# Patient Record
Sex: MALE | Race: WHITE | Employment: STUDENT | URBAN - METROPOLITAN AREA
[De-identification: names, ages, dates, MRNs, and addresses within clinical notes are randomized per-mention and may not be internally consistent; named-entity substitution may affect disease eponyms.]

---
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
## 2018-03-01 ENCOUNTER — TELEPHONE (OUTPATIENT)
Dept: ENDOCRINOLOGY | Facility: CLINIC | Age: 18
End: 2018-03-01

## 2018-03-01 NOTE — TELEPHONE ENCOUNTER
To schedulers: Please schedule  for lst available endocrine. Preferred provider Shady Lance MD  Endocrine triage          ----- Message from Maritza Kline sent at 3/1/2018  8:06 AM CST -----  Regarding: Pt coming from Pediatrics to Adult Endo from the [facility]  Contact: [phone]  Pt coming from Pediatrics to Adult Endo from the [facility].  Please call Jemma from the [facility] to get this arranged at the number listed above.  Jemma is requesting for pt to see Dr. Caruso and would like to coordinate for Sept.      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  Call Center Representatives DO NOT respond to messages.

## 2018-09-18 ENCOUNTER — OFFICE VISIT (OUTPATIENT)
Dept: PEDIATRIC HEMATOLOGY/ONCOLOGY | Facility: CLINIC | Age: 18
End: 2018-09-18
Attending: NURSE PRACTITIONER
Payer: COMMERCIAL

## 2018-09-18 ENCOUNTER — HOSPITAL ENCOUNTER (OUTPATIENT)
Dept: MRI IMAGING | Facility: CLINIC | Age: 18
Discharge: HOME | End: 2018-09-18
Attending: NURSE PRACTITIONER | Admitting: NURSE PRACTITIONER
Payer: COMMERCIAL

## 2018-09-18 ENCOUNTER — DOCUMENTATION ONLY (OUTPATIENT)
Dept: CARE COORDINATION | Facility: CLINIC | Age: 18
End: 2018-09-18

## 2018-09-18 ENCOUNTER — OFFICE VISIT (OUTPATIENT)
Dept: ENDOCRINOLOGY | Facility: CLINIC | Age: 18
End: 2018-09-18
Payer: COMMERCIAL

## 2018-09-18 VITALS
HEIGHT: 60 IN | HEART RATE: 87 BPM | RESPIRATION RATE: 20 BRPM | BODY MASS INDEX: 27.48 KG/M2 | DIASTOLIC BLOOD PRESSURE: 85 MMHG | SYSTOLIC BLOOD PRESSURE: 124 MMHG | OXYGEN SATURATION: 99 % | TEMPERATURE: 97.9 F | WEIGHT: 139.99 LBS

## 2018-09-18 VITALS
BODY MASS INDEX: 27.29 KG/M2 | DIASTOLIC BLOOD PRESSURE: 85 MMHG | HEART RATE: 87 BPM | SYSTOLIC BLOOD PRESSURE: 124 MMHG | WEIGHT: 139 LBS | HEIGHT: 60 IN

## 2018-09-18 DIAGNOSIS — C71.6 MEDULLOBLASTOMA (H): ICD-10-CM

## 2018-09-18 DIAGNOSIS — E23.0 GROWTH HORMONE DEFICIENCY FROM RADIATION (H): Primary | ICD-10-CM

## 2018-09-18 DIAGNOSIS — C71.6 MEDULLOBLASTOMA OF CEREBELLUM (H): ICD-10-CM

## 2018-09-18 DIAGNOSIS — C71.6 MEDULLOBLASTOMA OF CEREBELLUM (H): Primary | ICD-10-CM

## 2018-09-18 DIAGNOSIS — Z92.3 S/P RADIATION THERAPY: ICD-10-CM

## 2018-09-18 DIAGNOSIS — E23.0 GROWTH HORMONE DEFICIENCY FROM RADIATION (H): ICD-10-CM

## 2018-09-18 DIAGNOSIS — E03.8 SECONDARY HYPOTHYROIDISM: ICD-10-CM

## 2018-09-18 DIAGNOSIS — Z92.21 STATUS POST CHEMOTHERAPY: ICD-10-CM

## 2018-09-18 DIAGNOSIS — Z92.3 HISTORY OF RADIATION THERAPY: ICD-10-CM

## 2018-09-18 DIAGNOSIS — Z91.89 AT RISK FOR CARDIOMYOPATHY: ICD-10-CM

## 2018-09-18 LAB
ALBUMIN SERPL-MCNC: 4.3 G/DL (ref 3.4–5)
ALBUMIN UR-MCNC: NEGATIVE MG/DL
ALP SERPL-CCNC: 96 U/L (ref 65–260)
ALT SERPL W P-5'-P-CCNC: 24 U/L (ref 0–50)
ANION GAP SERPL CALCULATED.3IONS-SCNC: 7 MMOL/L (ref 3–14)
APPEARANCE UR: CLEAR
AST SERPL W P-5'-P-CCNC: 15 U/L (ref 0–35)
BASOPHILS # BLD AUTO: 0 10E9/L (ref 0–0.2)
BASOPHILS NFR BLD AUTO: 0.4 %
BILIRUB SERPL-MCNC: 0.3 MG/DL (ref 0.2–1.3)
BILIRUB UR QL STRIP: NEGATIVE
BUN SERPL-MCNC: 12 MG/DL (ref 7–30)
CALCIUM SERPL-MCNC: 9.2 MG/DL (ref 8.5–10.1)
CHLORIDE SERPL-SCNC: 99 MMOL/L (ref 98–110)
CO2 SERPL-SCNC: 29 MMOL/L (ref 20–32)
COLOR UR AUTO: YELLOW
CREAT SERPL-MCNC: 0.64 MG/DL (ref 0.5–1)
DIFFERENTIAL METHOD BLD: NORMAL
EOSINOPHIL # BLD AUTO: 0.1 10E9/L (ref 0–0.7)
EOSINOPHIL NFR BLD AUTO: 1.2 %
ERYTHROCYTE [DISTWIDTH] IN BLOOD BY AUTOMATED COUNT: 12.8 % (ref 10–15)
GFR SERPL CREATININE-BSD FRML MDRD: >90 ML/MIN/1.7M2
GLUCOSE SERPL-MCNC: 82 MG/DL (ref 70–99)
GLUCOSE UR STRIP-MCNC: NEGATIVE MG/DL
HCT VFR BLD AUTO: 44.9 % (ref 40–53)
HGB BLD-MCNC: 15 G/DL (ref 13.3–17.7)
HGB UR QL STRIP: NEGATIVE
IMM GRANULOCYTES # BLD: 0 10E9/L (ref 0–0.4)
IMM GRANULOCYTES NFR BLD: 0.4 %
KETONES UR STRIP-MCNC: NEGATIVE MG/DL
LEUKOCYTE ESTERASE UR QL STRIP: NEGATIVE
LYMPHOCYTES # BLD AUTO: 3.2 10E9/L (ref 0.8–5.3)
LYMPHOCYTES NFR BLD AUTO: 40.1 %
MCH RBC QN AUTO: 27.6 PG (ref 26.5–33)
MCHC RBC AUTO-ENTMCNC: 33.4 G/DL (ref 31.5–36.5)
MCV RBC AUTO: 83 FL (ref 78–100)
MONOCYTES # BLD AUTO: 0.3 10E9/L (ref 0–1.3)
MONOCYTES NFR BLD AUTO: 3.7 %
MUCOUS THREADS #/AREA URNS LPF: PRESENT /LPF
NEUTROPHILS # BLD AUTO: 4.4 10E9/L (ref 1.6–8.3)
NEUTROPHILS NFR BLD AUTO: 54.2 %
NITRATE UR QL: NEGATIVE
NRBC # BLD AUTO: 0 10*3/UL
NRBC BLD AUTO-RTO: 0 /100
PH UR STRIP: 6 PH (ref 5–7)
PLATELET # BLD AUTO: 297 10E9/L (ref 150–450)
POTASSIUM SERPL-SCNC: 3.3 MMOL/L (ref 3.4–5.3)
PROT SERPL-MCNC: 8.4 G/DL (ref 6.8–8.8)
RBC # BLD AUTO: 5.44 10E12/L (ref 4.4–5.9)
RBC #/AREA URNS AUTO: 1 /HPF (ref 0–2)
SODIUM SERPL-SCNC: 135 MMOL/L (ref 133–144)
SOURCE: ABNORMAL
SP GR UR STRIP: 1.02 (ref 1–1.03)
T4 FREE SERPL-MCNC: 1.04 NG/DL (ref 0.76–1.46)
TSH SERPL DL<=0.005 MIU/L-ACNC: 5.38 MU/L (ref 0.4–4)
UROBILINOGEN UR STRIP-MCNC: 0 MG/DL (ref 0–2)
WBC # BLD AUTO: 8.1 10E9/L (ref 4–11)
WBC #/AREA URNS AUTO: 0 /HPF (ref 0–5)

## 2018-09-18 PROCEDURE — 70553 MRI BRAIN STEM W/O & W/DYE: CPT

## 2018-09-18 PROCEDURE — A9585 GADOBUTROL INJECTION: HCPCS | Performed by: NURSE PRACTITIONER

## 2018-09-18 PROCEDURE — 25500064 ZZH RX 255 OP 636: Performed by: NURSE PRACTITIONER

## 2018-09-18 PROCEDURE — G0463 HOSPITAL OUTPT CLINIC VISIT: HCPCS | Mod: ZF

## 2018-09-18 RX ORDER — CETIRIZINE HYDROCHLORIDE 10 MG/1
10 TABLET ORAL
COMMUNITY

## 2018-09-18 RX ORDER — GADOBUTROL 604.72 MG/ML
7.5 INJECTION INTRAVENOUS ONCE
Status: COMPLETED | OUTPATIENT
Start: 2018-09-18 | End: 2018-09-18

## 2018-09-18 RX ORDER — PANTOPRAZOLE SODIUM 20 MG/1
20 TABLET, DELAYED RELEASE ORAL
COMMUNITY
End: 2021-09-21

## 2018-09-18 RX ADMIN — GADOBUTROL 6.3 ML: 604.72 INJECTION INTRAVENOUS at 09:50

## 2018-09-18 ASSESSMENT — PAIN SCALES - GENERAL
PAINLEVEL: NO PAIN (0)
PAINLEVEL: NO PAIN (0)

## 2018-09-18 NOTE — NURSING NOTE
"Chief Complaint   Patient presents with     RECHECK     Patient is here today for Medulloblastoma follow up     /85 (BP Location: Left arm, Patient Position: Fowlers, Cuff Size: Adult Regular)  Pulse 87  Temp 97.9  F (36.6  C) (Oral)  Resp 20  Ht 1.518 m (4' 11.76\")  Wt 63.5 kg (139 lb 15.9 oz)  SpO2 99%  BMI 27.56 kg/m2    Shala Robertson LPN  September 18, 2018    "

## 2018-09-18 NOTE — MR AVS SNAPSHOT
After Visit Summary   9/18/2018    David Beaulieu    MRN: [id number]           Patient Information     Date Of Birth          2000        Visit Information        Provider Department      9/18/2018 12:00 PM Erica Knight APRN CNP Peds Hematology Oncology        Today's Diagnoses     Medulloblastoma of cerebellum (H)    -  1    Status post chemotherapy        S/P radiation therapy        At risk for cardiomyopathy              [facility], 9th floor  [address] [place] [address]  Phone: [phone]  Clinic Hours:   Monday-Friday:   7 am to 5:00 pm   closed weekends and major  holidays     If your fever is 100.5  or greater,   call the clinic during business hours.   After hours call [phone] and ask for the pediatric hematology / oncology physician to be paged for you.               Follow-ups after your visit        Who to contact     Please call your clinic at [phone] to:    Ask questions about your health    Make or cancel appointments    Discuss your medicines    Learn about your test results    Speak to your doctor            Additional Information About Your Visit        [employer] Information     [employer] gives you secure access to your electronic health record. If you see a primary care provider, you can also send messages to your care team and make appointments. If you have questions, please call your primary care clinic.  If you do not have a primary care provider, please call [phone] and they will assist you.      [employer] is an electronic gateway that provides easy, online access to your medical records. With [employer], you can request a clinic appointment, read your test results, renew a prescription or communicate with your care team.     To access your existing account, please contact your [facility] or call [phone] for assistance.        Care EveryWhere ID     This is your Care  "EveryWhere ID. This could be used by other organizations to access your [place] medical records  [id number]        Your Vitals Were     Pulse Temperature Respirations Height Pulse Oximetry BMI (Body Mass Index)    87 97.9  F (36.6  C) (Oral) 20 1.518 m (4' 11.76\") 99% 27.56 kg/m2       Blood Pressure from Last 3 Encounters:   09/18/18 124/85   09/18/18 124/85   09/19/17 126/82    Weight from Last 3 Encounters:   09/18/18 63 kg (139 lb) (23 %)*   09/18/18 63.5 kg (139 lb 15.9 oz) (25 %)*   09/19/17 63.1 kg (139 lb 1.8 oz) (28 %)*     * Growth percentiles are based on [facility] 2-20 Years data.                 Today's Medication Changes          These changes are accurate as of 9/18/18 11:59 PM.  If you have any questions, ask your nurse or doctor.               These medicines have changed or have updated prescriptions.        Dose/Directions    methylphenidate ER 18 MG CR tablet   Commonly known as:  CONCERTA   This may have changed:  how much to take   Used for:  Medulloblastoma (H)        Dose:  18 mg   Take 1 tablet by mouth every morning.   Quantity:  30 tablet   Refills:  0                Primary Care Provider Office Phone # Fax #    Christian Dominic Youssef [phone]       [facility] [address] [place] [address]        Equal Access to Services     NA GALEANO AH: Kimberly Baig, waaxda jonny, qaybta kaalkane mendes, gita loredo. So [facility] [phone].    ATENCIÓN: Si habla español, tiene a mckay disposición servicios gratuitos de asistencia lingüística. Llame al [phone].    We comply with applicable federal civil rights laws and [place] laws. We do not discriminate on the basis of race, color, national origin, age, disability, sex, sexual orientation, or gender identity.            Thank you!     Thank you for choosing PEDS HEMATOLOGY ONCOLOGY  for your care. Our goal is always to provide you with excellent care. Hearing " back from our patients is one way we can continue to improve our services. Please take a few minutes to complete the written survey that you may receive in the mail after your visit with us. Thank you!             Your Updated Medication List - Protect others around you: Learn how to safely use, store and throw away your medicines at www.disposemymeds.org.          This list is accurate as of 9/18/18 11:59 PM.  Always use your most recent med list.                   Brand Name Dispense Instructions for use Diagnosis    cetirizine 10 MG tablet    zyrTEC     Take 10 mg by mouth        fluticasone 50 MCG/ACT spray    FLONASE    16 g    Spray 2 sprays into both nostrils daily    Medulloblastoma (H), Nasal congestion       ibuprofen 200 MG tablet    ADVIL/MOTRIN     Take 1 tablet by mouth every 6 hours as needed.        levothyroxine 125 MCG tablet    SYNTHROID/LEVOTHROID    90 tablet    Take 1 tablet (125 mcg) by mouth daily    Panhypopituitarism (H), TSH deficiency       methylphenidate ER 18 MG CR tablet    CONCERTA    30 tablet    Take 1 tablet by mouth every morning.    Medulloblastoma (H)       pantoprazole 20 MG EC tablet    PROTONIX     Take 20 mg by mouth        TYLENOL CHILDRENS PO      Take 1 tablet by mouth every 4 hours as needed. CHEW

## 2018-09-18 NOTE — PROGRESS NOTES
We had the pleasure of seeing your patient, David Beaulieu, at the [facility] Childhood Cancer Survivorship Program.  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  His tumor was located in his posterior fossa.  He was treated at the [facility] under the direction of Dr. Grant Rosenbaum.  David had initial surgical resection of his tumor which was as follows:   1.  Suboccipital craniotomy and near total tumor resection on 02/07/2008 with Dr. Wilber Valenzuela at the [facility].        David was treated as per the [facility] protocol [id number] and received the following chemotherapy:   1.  Vincristine intravenously.   2.  Cis-retinoic acid orally.   3.  Cisplatin intravenously with cumulative dose of 394 mg/meter squared.   4.  Cyclophosphamide intravenously with a cumulative dose of 12 grams/meter squared.      David also received radiation therapy as part of his treatment which is as follows:   1.  Whole brain radiation which started on 03/04/2008 and was completed on 03/31/2008 in 20 fractions, for a total dose of 3600 centigray.   2.  Spine radiation which started on 03/04/2008 was completed on 03/31/2008 in 20 fractions, for a total dose of 3600 centigray.   3.  Posterior fossa boost, which started on 04/01/2008 and was completed on 04/15/2008 in 11 fractions, for a total of 1972 centigray.   4.  Total dose to posterior fossa is 5572 centigray.      David's acute and chronic late effects known to date include:   1.  Severe bilateral sensorineural hearing loss diagnosed on 08/21/2008- wears hearing aids.   2.  Growth deceleration found on 09/22/2009.   3.  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   4.  Lower extremity weakness diagnosed on 11/03/2009, for which he started physical therapy.   5.  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   6.   "Problems with executive functioning diagnosed on 10/25/2010.   7.  Slow processing speed diagnosed on 10/25/2010.   8.  Problems with fine motor skills diagnosed on 10/25/2010.   9.  Reduced spinal growth diagnosed on 07/24/2012.   10.  Problems with sustained attention diagnosed on 08/17/2012.   11.  Problems with emotional lability.   12.  Depression which was diagnosed on 12/03/2014.   13.  Left ventricular dysfunction diagnosed 9/2015       HISTORY OF PRESENT ILLNESS:  David reports to his long term follow up evaluation with his father.  He has been doing fairly well.  The issues with his endurance have improved.  He did get an echo locally last year that was normal.   He reports that he had local PFTs that were at the lower limits of normal.  He saw local pulmonary and there were no concerns.  He did see ENT for his throat issues last year.  He was prescribed a \"pill\" and \"inhaler\" which improved his symptoms.   He is seeing adult endocrinology, Dr. Caruso, for the 1st time today.   With regards to his attention, he is still taking Concerta.   He does have accommodations in school to get extra time for testing and preferred seating.  He had neuropsych testing done in 12/2014.  David denies any headaches, vision changes.  He hasn't been for an eye doctor visit.    He denies any seizure-like activity or tremors, denied any back pain, no fever.   With regards to his hearing, he received hearing aids 2 years ago.  He had his hearing tested last May 2017.  He is due for a hearing test locally.    Sleep and appetite good.  No skin changes.  He did have 2 cavities at the dentist last visit.     REVIEW OF SYSTEMS:  Comprehensive review of systems was negative except as started above.      CURRENT MEDICATIONS:   Current Outpatient Prescriptions   Medication     Acetaminophen (TYLENOL CHILDRENS PO)     cetirizine (ZYRTEC) 10 MG tablet     fluticasone (FLONASE) 50 MCG/ACT nasal spray     ibuprofen (ADVIL,MOTRIN) 200 MG " "tablet     levothyroxine (SYNTHROID/LEVOTHROID) 125 MCG tablet     methylphenidate (CONCERTA) 18 MG CR tablet     pantoprazole (PROTONIX) 20 MG EC tablet     No current facility-administered medications for this visit.           ALLERGIES:  Amoxicillin.      FAMILY HISTORY:     Family History   Problem Relation Age of Onset     Cerebrovascular Disease Mother      Childhood Heart Surgery Father      PDA s/p repair     Hypertension Father    No change         SOCIAL HISTORY:  David is in his 2nd year of college at [employer] in [place].  He has accommodations for preferrential seating and extra time for tests.  He lives in [place].   Met with social work today for routine assessment.  He is majoring in accounting and will get an associates degree.  He would like to work in payroll.     PHYSICAL EXAMINATION:     VITAL SIGNS:   /85 (BP Location: Left arm, Patient Position: Fowlers, Cuff Size: Adult Regular)  Pulse 87  Temp 97.9  F (36.6  C) (Oral)  Resp 20  Ht 1.518 m (4' 11.76\")  Wt 63.5 kg (139 lb 15.9 oz)  SpO2 99%  BMI 27.56 kg/m2    Wt Readings from Last 3 Encounters:   09/18/18 63.5 kg (139 lb 15.9 oz) (25 %)*   09/19/17 63.1 kg (139 lb 1.8 oz) (28 %)*   09/19/17 63.1 kg (139 lb 1.8 oz) (28 %)*     * Growth percentiles are based on CDC 2-20 Years data.     Ht Readings from Last 2 Encounters:   09/18/18 1.518 m (4' 11.76\") (<1 %)*   09/19/17 1.511 m (4' 11.49\") (<1 %)*     * Growth percentiles are based on CDC 2-20 Years data.     88 %ile based on CDC 2-20 Years BMI-for-age data using vitals from 9/18/2018.      GENERAL:  David appears to be in no acute distress.  He is alert and oriented x3 and well-nourished.   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  Bilateral tympanic membranes are within normal limits.  Pupils are equally round and reactive to light and accommodation.  Extraocular movements are intact.  Oropharynx is clear without lesions. "   NECK:  Supple.  Thyroid is nonpalpable.  There is a well-healed occipital surgical scar.   CARDIOVASCULAR:  Regular rate and rhythm with no murmurs, rubs or gallops.  Intact distal pulses.   RESPIRATORY:  Breath sounds are clear to auscultation bilaterally with normal effort and no distress.  No wheezes, crackles or rales were auscultated.   ABDOMEN:  Soft, nondistended, nontender, with bowel sounds in all 4 quadrants, no palpable masses or tenderness.   MUSCULOSKELETAL:  Normal range of motion. Muscular hypoplasia to paraspinous muscles - stable  LYMPHATIC:  Patient has no lymphadenopathy.   NEUROLOGIC:  Natali is alert and oriented x3, has 2-3+ lower bilateral reflexes.  He has no cranial nerve deficits and has normal muscle tone.  GCS score is 15.   SKIN:  Skin is warm and dry with no rashes or erythema.   PSYCHIATRIC:  Mood and affect are within normal limits.      LABORATORY STUDIES:   Results for NATALI MALAVE (MRN [id number]) as of 9/22/2018 11:48   Ref. Range 9/18/2018 15:46 9/18/2018 16:01   Sodium Latest Ref Range: 133 - 144 mmol/L 135    Potassium Latest Ref Range: 3.4 - 5.3 mmol/L 3.3 (L)    Chloride Latest Ref Range: 98 - 110 mmol/L 99    Carbon Dioxide Latest Ref Range: 20 - 32 mmol/L 29    Urea Nitrogen Latest Ref Range: 7 - 30 mg/dL 12    Creatinine Latest Ref Range: 0.50 - 1.00 mg/dL 0.64    GFR Estimate Latest Ref Range: >60 mL/min/1.7m2 >90    GFR Estimate If Black Latest Ref Range: >60 mL/min/1.7m2 >90    Calcium Latest Ref Range: 8.5 - 10.1 mg/dL 9.2    Anion Gap Latest Ref Range: 3 - 14 mmol/L 7    Albumin Latest Ref Range: 3.4 - 5.0 g/dL 4.3    Protein Total Latest Ref Range: 6.8 - 8.8 g/dL 8.4    Bilirubin Total Latest Ref Range: 0.2 - 1.3 mg/dL 0.3    Alkaline Phosphatase Latest Ref Range: 65 - 260 U/L 96    ALT Latest Ref Range: 0 - 50 U/L 24    AST Latest Ref Range: 0 - 35 U/L 15    Cortisol Serum Latest Ref Range: 4 - 22 ug/dL 13.6    T4 Free Latest Ref Range: 0.76 - 1.46 ng/dL 1.04     TSH Latest Ref Range: 0.40 - 4.00 mU/L 5.38 (H)    Glucose Latest Ref Range: 70 - 99 mg/dL 82    WBC Latest Ref Range: 4.0 - 11.0 10e9/L 8.1    Hemoglobin Latest Ref Range: 13.3 - 17.7 g/dL 15.0    Hematocrit Latest Ref Range: 40.0 - 53.0 % 44.9    Platelet Count Latest Ref Range: 150 - 450 10e9/L 297    RBC Count Latest Ref Range: 4.4 - 5.9 10e12/L 5.44    MCV Latest Ref Range: 78 - 100 fl 83    MCH Latest Ref Range: 26.5 - 33.0 pg 27.6    MCHC Latest Ref Range: 31.5 - 36.5 g/dL 33.4    RDW Latest Ref Range: 10.0 - 15.0 % 12.8    Diff Method Unknown Automated Method    % Neutrophils Latest Units: % 54.2    % Lymphocytes Latest Units: % 40.1    % Monocytes Latest Units: % 3.7    % Eosinophils Latest Units: % 1.2    % Basophils Latest Units: % 0.4    % Immature Granulocytes Latest Units: % 0.4    Nucleated RBCs Latest Ref Range: 0 /100 0    Absolute Neutrophil Latest Ref Range: 1.6 - 8.3 10e9/L 4.4    Absolute Lymphocytes Latest Ref Range: 0.8 - 5.3 10e9/L 3.2    Absolute Monocytes Latest Ref Range: 0.0 - 1.3 10e9/L 0.3    Absolute Eosinophils Latest Ref Range: 0.0 - 0.7 10e9/L 0.1    Absolute Basophils Latest Ref Range: 0.0 - 0.2 10e9/L 0.0    Abs Immature Granulocytes Latest Ref Range: 0 - 0.4 10e9/L 0.0    Absolute Nucleated RBC Unknown 0.0    Color Urine Unknown  Yellow   Appearance Urine Unknown  Clear   Glucose Urine Latest Ref Range: NEG^Negative mg/dL  Negative   Bilirubin Urine Latest Ref Range: NEG^Negative   Negative   Ketones Urine Latest Ref Range: NEG^Negative mg/dL  Negative   Specific Gravity Urine Latest Ref Range: 1.003 - 1.035   1.017   pH Urine Latest Ref Range: 5.0 - 7.0 pH  6.0   Protein Albumin Urine Latest Ref Range: NEG^Negative mg/dL  Negative   Urobilinogen mg/dL Latest Ref Range: 0.0 - 2.0 mg/dL  0.0   Nitrite Urine Latest Ref Range: NEG^Negative   Negative   Blood Urine Latest Ref Range: NEG^Negative   Negative   Leukocyte Esterase Urine Latest Ref Range: NEG^Negative   Negative    Source Unknown  Midstream Urine   WBC Urine Latest Ref Range: 0 - 5 /HPF  0   RBC Urine Latest Ref Range: 0 - 2 /HPF  1   Mucous Urine Latest Ref Range: NEG^Negative /LPF  Present (A)   Ins Growth Factor 1 Latest Ref Range: 137 - 428 ng/ml 232         DIAGNOSTIC IMAGING:     Preliminary MRI report; slight increase in cavernomas but otherwise stable.    Results for orders placed or performed during the hospital encounter of 09/18/18   MRI Brain w & w/o contrast    Narrative     MR BRAIN W/O & W CONTRAST 9/18/2018 10:19 AM    History: Medulloblastoma diagnosed on 02/07/2008 at 7 years of age.  ?Initial surgical resection of his tumor with suboccipital craniotomy  and near total tumor resection on 02/07/2008.  ??  Patient was treated with subsequent chemo therapy and radiation  therapy.    ??  Comparison: Head MRI 9/18/2017 9/20/2016, 9/30/2015 and 2/8/2008    Technique: Multiplanar T1-weighted, axial FLAIR, and susceptibility  images were obtained without intravenous contrast. Following  intravenous gadolinium-based contrast administration, axial  T2-weighted, diffusion, and T1-weighted images (in multiple planes)  were obtained.    Contrast dose: 6.3ml gadavist    Findings:   Postsurgical changes of suboccipital craniectomy and resection of  medulloblastoma of the fourth ventricle with evidence of hemosiderin  staining of the surgical cavity. Hypotrophic cerebellar vermis and  right cerebellar hemisphere, similar to 2017, may be due to radiation  and surgery.     More numerous scattered punctate foci of susceptibility artifact  within the cerebellum. One focus in the left cerebellar lobe appears  enlarged compared to prior. There is no evidence of enhancement or  increased diffusion restriction to suggest recurrence.    Stable T1 signal within the dentate nuclei left more than right,  likely due to gadolinium accumulation.    No definite abnormality of the skull marrow signal is noted. The major  vascular  intracranial flow-voids are present. The visualized portions  of paranasal sinuses, and mastoid air cells are relatively clear. The  orbits are grossly unremarkable.      Impression    Impression:  1. Postsurgical changes of posterior fossa medulloblastoma resection  without evidence of tumor recurrence.  2. 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  3. Stable signs of gadolinium accumulation    I have personally reviewed the examination and initial interpretation  and I agree with the findings.    ERICA LEMOS MD         Audiogram: done locally        ASSESSMENT AND PLAN:  David Beaulieu is a now 17 year-old  male with a history of medulloblastoma that completed treatment in 04/2009 with cis-retinoic acid.  He has been off therapy for 9 years.  He has some late effects in the area of endocrinopathies as well as neurocognitive likely due to his radiation therapy.  He also has hearing loss from his cisplatin treatment.   He continues to have stable radiation related changes on his MRI.   The following are our long term recommendations:     1.  Risk of recurrence:  David is currently 9 years from the end of treatment for his medulloblastoma.  Given the distance from the diagnosis of his primary malignancy, the likelihood of recurrence continues to decline.  We would recommend that he continue to have yearly assessment based on his previous history as well as radiation with MRIs yearly.  We will order this to occur in 12 months to coincide with his next Endocrinology appointment. After he is 10 years off therapy, we will discuss changing the MRI frequency to every other year.  Currently we are monitoring his radiation changes.    2.  Psychosocial effects:  David had previous neuropsych testing and was found to have problems with sustained attention, processing speed and some motor skills.  He has accommodations in college.  If he notices any significant  changes in cognition, we would recommend repeat testing.   3.  Risk for cardiac toxicity:  David has a history of spine radiation, which puts him at risk for developing cardiomyopathy and valvular dysfunction.  We recommend he have an echocardiogram every 5 years.  Baseline study in 2015 showed decreased LVEF to 47% in 4 chamber and 52% biplane; also small PFO with L to R shunting.  He saw peds cardiology in 2015 but family would like to follow up locally.  He had a local echocardiogram last year that was completely normal.  We will plan to reimage next year in 2019.   There is also increasing evidence regarding metabolic syndrome in cancer survivors.  We would recommend that David have fasting lipids followed at least every 2-3 years.  Last done in 2015, so they should be repeated next year.  4.  Hearing loss:  David has a history of sensorineural hearing loss likely related to his posterior fossa radiation and cisplatin treatment.  He has hearing aids now and should continue his exams locally.  5.  Risk for peripheral neuropathy:  David has a history of vincristine exposure, which does put him at increased risk for peripheral motor neuropathy.  He does have some previous issues with fine motor skills, but these appear to be improved.   6.  Endocrinopathies:  David has a history of growth hormone deficiency and hypothyroidism, which are both likely from his radiation treatment.  He should have regular followup with Endocrinology as directed.   7.  Risk for secondary neoplasms/vascular changes:  David has a history of radiation, which does put him at increased risk for secondary neoplasms.  I counseled him that should he notice any changes in his cognitive status, any new lumps, bumps, rashes that they should be promptly evaluated given his history of radiation.  He will have an MRI done in 12 months to evaluate post his whole brain radiation.  I did discuss with his father that we would stop doing spine  examinations at this point and solely do brain MRI.   He had a new likely radiation induced blooming artifact that we will continue to follow.  I did also discuss the risk for stroke post radiation and reviewed symptoms.  Mom does have a history of CVA and dad is unsure if she has any underlying disorder that caused this.  CBC today is normal.  8.  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  He currently has no back pain or concerns.   9.  Risk for kidney toxicity:  David has a history of cisplatin and cyclophosphamide treatment, which can cause kidney and bladder dysfunction.  David's baseline CMP and UA were within normal limits.  We recommend that he have yearly assessments of his blood pressure and urinalysis.    10. Risk for cataracts:  David has a history of whole brain radiation so he has a risk for cataracts.  He should have regular eye exams completed.  David will make an appt in the near future.  No cataracts noted on exam today.       It was a pleasure seeing David in our Cancer Survivorship Program.  We appreciate the opportunity to participate in your patient's care.  If you have any questions or concerns, please do not hesitate to contact us at [phone].   We ask that David return in 12 months with an MRI brain and echo.  He will get his next audiogram locally.     We will try to coordinate with endocrine.           Erica Knight MSN, APRN, CPNP-AC, CPON  Department of Pediatrics  Division of Hematology/Oncology

## 2018-09-18 NOTE — PROGRESS NOTES
"Long-Term Follow-up/Survivorship  Psychosocial Assessment    Assessment completed of living situation, support system, financial status, functional status, coping, stressors, need for resources and social work intervention provided as needed.     Diagnosis: The patient was diagnosed with Medulloblastoma in February of 2008 at the age of 7.     Provider: Erica Knight.    Presenting Information: David is a 17 year-old, male, who presented to the [facility] clinic on 9/18/18 with his father, Elliot.    Living Situation: David reported he lives in a dorm room on campus at [employer], located in [place]. David continues to visit his family on breaks.  His family lives in [place], [place]. He has a younger sister, Eugenia, who lives at home with his parents.    Transportation Mode: David's dad drove him to his appointment today. In addition, David reported he drives independently. He recently purchased a new vehicle and transportation barriers were not identified.    Family Constellation and Support Network: David's support consists of his parents, extended family, and friends. Support is reportedly adequate.    Interests/Activities: David reported he has been busy between work and school, but enjoys playing video games and watching TV. David reported he also likes watching Netflix and finding new shows.     Cultural and Worship Factors: The family identifies as Yarsanism.     Insurance: Elliot reported they were successful in having David \"SMRT'ed\" through the [facility] and he now has medical assistance. Insurance coverage is adequate.     Employment/Financial: David reported he applied for social security, but was denied. He may re-apply in the future, but will continue to maintain employment for the time being. 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    Legal: No " legal involvement or concerns identified.     Patient Education/Development Level: David is a sophomore at [employer] in [place], [place] studying accounting.  He is doing well in school and has accommodations in place for extra test taking time and preferential seating.  Updated neuropsych testing was completed in December of 2016. Should any changes occur, updated testing would be recommended.    Mental/Chemical Health: David described his mood as being good. He was pleasant and talkative during our time together.  David does not have a history of mental health concerns to report. He has not seen a therapist and/or taken medication to assist with mood management in the past. Concerns were not identified.    Trauma History: No concerns identified.     Emotional/Social/Cognitive Effect: David seems to have adjusted well as a survivor.  He is doing well in school with accommodations in place, has good support, and denies any mental health concerns.  Concerns were not identified.    Advanced Medical Directive (For 18 year old patients and emancipated minors only): David makes his own medical decisions and is not interested in a HCD at this time.     Assessment and Recommendations for the Team: David appears to be effectively transitioning to adult care. He currently lives in a dorm, has been employed full-time in a seasonal position, and attends [employer]. No psychosocial barriers were identified, but writer will remain available if anything were to change.       Community/Supportive Resources: n/a    Interventions:   1. Provide ongoing assessment of patient and family's level of coping.   2. Provide psychosocial supportive counseling and crisis intervention as needed.   3. Facilitate service linkage with hospital and community resources as needed.   4. Collaborate with healthcare team to meet patient and family's needs.     Plan:    provided a business card and encouraged the  patient to call if any questions or concerns arise before next clinic visit.     JIMMY Woodward, CHARLINE  Clinical   Pediatric Outpatient Clinics/Long Term Follow-Up/Women s Health    [facility]   [email or web address]   Office: [phone]   Pager: [phone]

## 2018-09-18 NOTE — LETTER
Patient:  David Beaulieu  :   2000  MRN:     [id number]        Mr.Zeffen GENEVIEVE Beaulieu   [id number]  [facility] [id number]        2018    Dear ,    We are writing to inform you of your test results. Please make sure to take levothyroxine everyday without missing.    Take care    Karen Caruso MD      Resulted Orders   TSH   Result Value Ref Range    TSH 5.38 (H) 0.40 - 4.00 mU/L   T4 free   Result Value Ref Range    T4 Free 1.04 0.76 - 1.46 ng/dL       Lab

## 2018-09-18 NOTE — LETTER
9/18/2018      RE: David Beaulieu  [address]  [place] [address]              We had the pleasure of seeing your patient, David Beaulieu, at the [facility] Childhood Cancer Survivorship Program.  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  His tumor was located in his posterior fossa.  He was treated at the [facility] under the direction of Dr. Grant Rosenbaum.  David had initial surgical resection of his tumor which was as follows:   1.  Suboccipital craniotomy and near total tumor resection on 02/07/2008 with Dr. Wilber Valenzuela at the [facility].        David was treated as per the [facility] protocol [id number] and received the following chemotherapy:   1.  Vincristine intravenously.   2.  Cis-retinoic acid orally.   3.  Cisplatin intravenously with cumulative dose of 394 mg/meter squared.   4.  Cyclophosphamide intravenously with a cumulative dose of 12 grams/meter squared.      David also received radiation therapy as part of his treatment which is as follows:   1.  Whole brain radiation which started on 03/04/2008 and was completed on 03/31/2008 in 20 fractions, for a total dose of 3600 centigray.   2.  Spine radiation which started on 03/04/2008 was completed on 03/31/2008 in 20 fractions, for a total dose of 3600 centigray.   3.  Posterior fossa boost, which started on 04/01/2008 and was completed on 04/15/2008 in 11 fractions, for a total of 1972 centigray.   4.  Total dose to posterior fossa is 5572 centigray.      David's acute and chronic late effects known to date include:   1.  Severe bilateral sensorineural hearing loss diagnosed on 08/21/2008- wears hearing aids.   2.  Growth deceleration found on 09/22/2009.   3.  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   4.  Lower extremity weakness diagnosed on 11/03/2009, for which he started  "physical therapy.   5.  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   6.  Problems with executive functioning diagnosed on 10/25/2010.   7.  Slow processing speed diagnosed on 10/25/2010.   8.  Problems with fine motor skills diagnosed on 10/25/2010.   9.  Reduced spinal growth diagnosed on 07/24/2012.   10.  Problems with sustained attention diagnosed on 08/17/2012.   11.  Problems with emotional lability.   12.  Depression which was diagnosed on 12/03/2014.   13.  Left ventricular dysfunction diagnosed 9/2015       HISTORY OF PRESENT ILLNESS:  David reports to his long term follow up evaluation with his father.  He has been doing fairly well.  The issues with his endurance have improved.  He did get an echo locally last year that was normal.   He reports that he had local PFTs that were at the lower limits of normal.  He saw local pulmonary and there were no concerns.  He did see ENT for his throat issues last year.  He was prescribed a \"pill\" and \"inhaler\" which improved his symptoms.   He is seeing adult endocrinology, Dr. Caruso, for the 1st time today.   With regards to his attention, he is still taking Concerta.   He does have accommodations in school to get extra time for testing and preferred seating.  He had neuropsych testing done in 12/2014.  David denies any headaches, vision changes.  He hasn't been for an eye doctor visit.    He denies any seizure-like activity or tremors, denied any back pain, no fever.   With regards to his hearing, he received hearing aids 2 years ago.  He had his hearing tested last May 2017.  He is due for a hearing test locally.    Sleep and appetite good.  No skin changes.  He did have 2 cavities at the dentist last visit.     REVIEW OF SYSTEMS:  Comprehensive review of systems was negative except as started above.      CURRENT MEDICATIONS:   Current Outpatient Prescriptions   Medication     Acetaminophen (TYLENOL CHILDRENS PO)     cetirizine (ZYRTEC) 10 MG tablet " "    fluticasone (FLONASE) 50 MCG/ACT nasal spray     ibuprofen (ADVIL,MOTRIN) 200 MG tablet     levothyroxine (SYNTHROID/LEVOTHROID) 125 MCG tablet     methylphenidate (CONCERTA) 18 MG CR tablet     pantoprazole (PROTONIX) 20 MG EC tablet     No current facility-administered medications for this visit.           ALLERGIES:  Amoxicillin.      FAMILY HISTORY:     Family History   Problem Relation Age of Onset     Cerebrovascular Disease Mother      Childhood Heart Surgery Father      PDA s/p repair     Hypertension Father    No change         SOCIAL HISTORY:  David is in his 2nd year of college at [employer] in [place].  He has accommodations for preferrential seating and extra time for tests.  He lives in [place].   Met with social work today for routine assessment.  He is majoring in accounting and will get an associates degree.  He would like to work in payroll.     PHYSICAL EXAMINATION:     VITAL SIGNS:   /85 (BP Location: Left arm, Patient Position: Fowlers, Cuff Size: Adult Regular)  Pulse 87  Temp 97.9  F (36.6  C) (Oral)  Resp 20  Ht 1.518 m (4' 11.76\")  Wt 63.5 kg (139 lb 15.9 oz)  SpO2 99%  BMI 27.56 kg/m2    Wt Readings from Last 3 Encounters:   09/18/18 63.5 kg (139 lb 15.9 oz) (25 %)*   09/19/17 63.1 kg (139 lb 1.8 oz) (28 %)*   09/19/17 63.1 kg (139 lb 1.8 oz) (28 %)*     * Growth percentiles are based on CDC 2-20 Years data.     Ht Readings from Last 2 Encounters:   09/18/18 1.518 m (4' 11.76\") (<1 %)*   09/19/17 1.511 m (4' 11.49\") (<1 %)*     * Growth percentiles are based on CDC 2-20 Years data.     88 %ile based on CDC 2-20 Years BMI-for-age data using vitals from 9/18/2018.      GENERAL:  David appears to be in no acute distress.  He is alert and oriented x3 and well-nourished.   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  Bilateral tympanic membranes are within normal limits.  Pupils are equally round and reactive to light and " accommodation.  Extraocular movements are intact.  Oropharynx is clear without lesions.   NECK:  Supple.  Thyroid is nonpalpable.  There is a well-healed occipital surgical scar.   CARDIOVASCULAR:  Regular rate and rhythm with no murmurs, rubs or gallops.  Intact distal pulses.   RESPIRATORY:  Breath sounds are clear to auscultation bilaterally with normal effort and no distress.  No wheezes, crackles or rales were auscultated.   ABDOMEN:  Soft, nondistended, nontender, with bowel sounds in all 4 quadrants, no palpable masses or tenderness.   MUSCULOSKELETAL:  Normal range of motion. Muscular hypoplasia to paraspinous muscles - stable  LYMPHATIC:  Patient has no lymphadenopathy.   NEUROLOGIC:  Natali is alert and oriented x3, has 2-3+ lower bilateral reflexes.  He has no cranial nerve deficits and has normal muscle tone.  GCS score is 15.   SKIN:  Skin is warm and dry with no rashes or erythema.   PSYCHIATRIC:  Mood and affect are within normal limits.      LABORATORY STUDIES:   Results for NATALI MALAVE (MRN [id number]) as of 9/22/2018 11:48   Ref. Range 9/18/2018 15:46 9/18/2018 16:01   Sodium Latest Ref Range: 133 - 144 mmol/L 135    Potassium Latest Ref Range: 3.4 - 5.3 mmol/L 3.3 (L)    Chloride Latest Ref Range: 98 - 110 mmol/L 99    Carbon Dioxide Latest Ref Range: 20 - 32 mmol/L 29    Urea Nitrogen Latest Ref Range: 7 - 30 mg/dL 12    Creatinine Latest Ref Range: 0.50 - 1.00 mg/dL 0.64    GFR Estimate Latest Ref Range: >60 mL/min/1.7m2 >90    GFR Estimate If Black Latest Ref Range: >60 mL/min/1.7m2 >90    Calcium Latest Ref Range: 8.5 - 10.1 mg/dL 9.2    Anion Gap Latest Ref Range: 3 - 14 mmol/L 7    Albumin Latest Ref Range: 3.4 - 5.0 g/dL 4.3    Protein Total Latest Ref Range: 6.8 - 8.8 g/dL 8.4    Bilirubin Total Latest Ref Range: 0.2 - 1.3 mg/dL 0.3    Alkaline Phosphatase Latest Ref Range: 65 - 260 U/L 96    ALT Latest Ref Range: 0 - 50 U/L 24    AST Latest Ref Range: 0 - 35 U/L 15    Cortisol Serum  Latest Ref Range: 4 - 22 ug/dL 13.6    T4 Free Latest Ref Range: 0.76 - 1.46 ng/dL 1.04    TSH Latest Ref Range: 0.40 - 4.00 mU/L 5.38 (H)    Glucose Latest Ref Range: 70 - 99 mg/dL 82    WBC Latest Ref Range: 4.0 - 11.0 10e9/L 8.1    Hemoglobin Latest Ref Range: 13.3 - 17.7 g/dL 15.0    Hematocrit Latest Ref Range: 40.0 - 53.0 % 44.9    Platelet Count Latest Ref Range: 150 - 450 10e9/L 297    RBC Count Latest Ref Range: 4.4 - 5.9 10e12/L 5.44    MCV Latest Ref Range: 78 - 100 fl 83    MCH Latest Ref Range: 26.5 - 33.0 pg 27.6    MCHC Latest Ref Range: 31.5 - 36.5 g/dL 33.4    RDW Latest Ref Range: 10.0 - 15.0 % 12.8    Diff Method Unknown Automated Method    % Neutrophils Latest Units: % 54.2    % Lymphocytes Latest Units: % 40.1    % Monocytes Latest Units: % 3.7    % Eosinophils Latest Units: % 1.2    % Basophils Latest Units: % 0.4    % Immature Granulocytes Latest Units: % 0.4    Nucleated RBCs Latest Ref Range: 0 /100 0    Absolute Neutrophil Latest Ref Range: 1.6 - 8.3 10e9/L 4.4    Absolute Lymphocytes Latest Ref Range: 0.8 - 5.3 10e9/L 3.2    Absolute Monocytes Latest Ref Range: 0.0 - 1.3 10e9/L 0.3    Absolute Eosinophils Latest Ref Range: 0.0 - 0.7 10e9/L 0.1    Absolute Basophils Latest Ref Range: 0.0 - 0.2 10e9/L 0.0    Abs Immature Granulocytes Latest Ref Range: 0 - 0.4 10e9/L 0.0    Absolute Nucleated RBC Unknown 0.0    Color Urine Unknown  Yellow   Appearance Urine Unknown  Clear   Glucose Urine Latest Ref Range: NEG^Negative mg/dL  Negative   Bilirubin Urine Latest Ref Range: NEG^Negative   Negative   Ketones Urine Latest Ref Range: NEG^Negative mg/dL  Negative   Specific Gravity Urine Latest Ref Range: 1.003 - 1.035   1.017   pH Urine Latest Ref Range: 5.0 - 7.0 pH  6.0   Protein Albumin Urine Latest Ref Range: NEG^Negative mg/dL  Negative   Urobilinogen mg/dL Latest Ref Range: 0.0 - 2.0 mg/dL  0.0   Nitrite Urine Latest Ref Range: NEG^Negative   Negative   Blood Urine Latest Ref Range:  NEG^Negative   Negative   Leukocyte Esterase Urine Latest Ref Range: NEG^Negative   Negative   Source Unknown  Midstream Urine   WBC Urine Latest Ref Range: 0 - 5 /HPF  0   RBC Urine Latest Ref Range: 0 - 2 /HPF  1   Mucous Urine Latest Ref Range: NEG^Negative /LPF  Present (A)   Ins Growth Factor 1 Latest Ref Range: 137 - 428 ng/ml 232         DIAGNOSTIC IMAGING:     Preliminary MRI report; slight increase in cavernomas but otherwise stable.    Results for orders placed or performed during the hospital encounter of 09/18/18   MRI Brain w & w/o contrast    Narrative     MR BRAIN W/O & W CONTRAST 9/18/2018 10:19 AM    History: Medulloblastoma diagnosed on 02/07/2008 at 7 years of age.  ?Initial surgical resection of his tumor with suboccipital craniotomy  and near total tumor resection on 02/07/2008.  ??  Patient was treated with subsequent chemo therapy and radiation  therapy.    ??  Comparison: Head MRI 9/18/2017 9/20/2016, 9/30/2015 and 2/8/2008    Technique: Multiplanar T1-weighted, axial FLAIR, and susceptibility  images were obtained without intravenous contrast. Following  intravenous gadolinium-based contrast administration, axial  T2-weighted, diffusion, and T1-weighted images (in multiple planes)  were obtained.    Contrast dose: 6.3ml gadavist    Findings:   Postsurgical changes of suboccipital craniectomy and resection of  medulloblastoma of the fourth ventricle with evidence of hemosiderin  staining of the surgical cavity. Hypotrophic cerebellar vermis and  right cerebellar hemisphere, similar to 2017, may be due to radiation  and surgery.     More numerous scattered punctate foci of susceptibility artifact  within the cerebellum. One focus in the left cerebellar lobe appears  enlarged compared to prior. There is no evidence of enhancement or  increased diffusion restriction to suggest recurrence.    Stable T1 signal within the dentate nuclei left more than right,  likely due to gadolinium  accumulation.    No definite abnormality of the skull marrow signal is noted. The major  vascular intracranial flow-voids are present. The visualized portions  of paranasal sinuses, and mastoid air cells are relatively clear. The  orbits are grossly unremarkable.      Impression    Impression:  1. Postsurgical changes of posterior fossa medulloblastoma resection  without evidence of tumor recurrence.  2. 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  3. Stable signs of gadolinium accumulation    I have personally reviewed the examination and initial interpretation  and I agree with the findings.    ERICA LEMOS MD         Audiogram: done locally        ASSESSMENT AND PLAN:  David Beaulieu is a now 17 year-old  male with a history of medulloblastoma that completed treatment in 04/2009 with cis-retinoic acid.  He has been off therapy for 9 years.  He has some late effects in the area of endocrinopathies as well as neurocognitive likely due to his radiation therapy.  He also has hearing loss from his cisplatin treatment.   He continues to have stable radiation related changes on his MRI.   The following are our long term recommendations:     1.  Risk of recurrence:  David is currently 9 years from the end of treatment for his medulloblastoma.  Given the distance from the diagnosis of his primary malignancy, the likelihood of recurrence continues to decline.  We would recommend that he continue to have yearly assessment based on his previous history as well as radiation with MRIs yearly.  We will order this to occur in 12 months to coincide with his next Endocrinology appointment. After he is 10 years off therapy, we will discuss changing the MRI frequency to every other year.  Currently we are monitoring his radiation changes.    2.  Psychosocial effects:  David had previous neuropsych testing and was found to have problems with sustained attention, processing  speed and some motor skills.  He has accommodations in college.  If he notices any significant changes in cognition, we would recommend repeat testing.   3.  Risk for cardiac toxicity:  David has a history of spine radiation, which puts him at risk for developing cardiomyopathy and valvular dysfunction.  We recommend he have an echocardiogram every 5 years.  Baseline study in 2015 showed decreased LVEF to 47% in 4 chamber and 52% biplane; also small PFO with L to R shunting.  He saw peds cardiology in 2015 but family would like to follow up locally.  He had a local echocardiogram last year that was completely normal.  We will plan to reimage next year in 2019.   There is also increasing evidence regarding metabolic syndrome in cancer survivors.  We would recommend that David have fasting lipids followed at least every 2-3 years.  Last done in 2015, so they should be repeated next year.  4.  Hearing loss:  David has a history of sensorineural hearing loss likely related to his posterior fossa radiation and cisplatin treatment.  He has hearing aids now and should continue his exams locally.  5.  Risk for peripheral neuropathy:  David has a history of vincristine exposure, which does put him at increased risk for peripheral motor neuropathy.  He does have some previous issues with fine motor skills, but these appear to be improved.   6.  Endocrinopathies:  David has a history of growth hormone deficiency and hypothyroidism, which are both likely from his radiation treatment.  He should have regular followup with Endocrinology as directed.   7.  Risk for secondary neoplasms/vascular changes:  David has a history of radiation, which does put him at increased risk for secondary neoplasms.  I counseled him that should he notice any changes in his cognitive status, any new lumps, bumps, rashes that they should be promptly evaluated given his history of radiation.  He will have an MRI done in 12 months to evaluate post  his whole brain radiation.  I did discuss with his father that we would stop doing spine examinations at this point and solely do brain MRI.   He had a new likely radiation induced blooming artifact that we will continue to follow.  I did also discuss the risk for stroke post radiation and reviewed symptoms.  Mom does have a history of CVA and dad is unsure if she has any underlying disorder that caused this.  CBC today is normal.  8.  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  He currently has no back pain or concerns.   9.  Risk for kidney toxicity:  David has a history of cisplatin and cyclophosphamide treatment, which can cause kidney and bladder dysfunction.  David's baseline CMP and UA were within normal limits.  We recommend that he have yearly assessments of his blood pressure and urinalysis.    10. Risk for cataracts:  David has a history of whole brain radiation so he has a risk for cataracts.  He should have regular eye exams completed.  David will make an appt in the near future.  No cataracts noted on exam today.       It was a pleasure seeing David in our Cancer Survivorship Program.  We appreciate the opportunity to participate in your patient's care.  If you have any questions or concerns, please do not hesitate to contact us at [phone].   We ask that David return in 12 months with an MRI brain and echo.  He will get his next audiogram locally.     We will try to coordinate with endocrine.           Erica Knight MSN, APRN, CPNP-AC, CPON  Department of Pediatrics  Division of Hematology/Oncology

## 2018-09-18 NOTE — PROGRESS NOTES
- Endocrinology Initial Consultation -    Reason for visit/consult: Gross hormone deficiency,  secondary hypothyroidism.    Primary care provider: Christian Youssef    HPI: A 17-year-old male here for transition care from pediatric endocrinologist for his long history of growth hormone deficiency, secondary hypogonadism, secondary hypothyroidism.  Patient came with his father. Last seen by Dr. Higgins in 9/2015.   Patient was diagnosed medulloblastoma at age 7 with a symptom of a headache and vomiting, he underwent brain surgery followed by chemotherapy and radiation therapy.  Patient developed gross hormone deficiency which was confirmed by biochemical test, was on growth hormone injection since end of 3/2010. Growth hormone stimulation testing 03/17/2010 showed a peak response following clonidine of 3 and arginine of 3.4 consistent with severe growth hormone deficiency. He was started on growth hormone therapy at the end of 03/2010. GH was discontinued in 4/2015. He did not have symptoms such as low energy, myalgia, arthralgia after discontinuing growth hormone injection.  He also has secondary hypothyroidism which he is currently on levothyroxine 125 mcg with good compliance.     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   Energy level: ok    Past Medical/Surgical History:  Past Medical History:   Diagnosis Date     Cancer (H)      No past surgical history on file.    Allergies:  Allergies   Allergen Reactions     Amoxicillin Hives     SUSR       Current Medications   Current Outpatient Prescriptions   Medication     Acetaminophen (TYLENOL CHILDRENS PO)     cetirizine (ZYRTEC) 10 MG tablet     fluticasone (FLONASE) 50 MCG/ACT nasal spray     ibuprofen (ADVIL,MOTRIN) 200 MG tablet     levothyroxine (SYNTHROID/LEVOTHROID) 125 MCG tablet     methylphenidate (CONCERTA)  "18 MG CR tablet     pantoprazole (PROTONIX) 20 MG EC tablet     No current facility-administered medications for this visit.        Family History:  Family History   Problem Relation Age of Onset     Cerebrovascular Disease Mother      Childhood Heart Surgery Father      PDA s/p repair     Hypertension Father    maternal aunt: ovarian cancer, maternal grandmother: lung cancer, maternal grandfather: prostate cancer    Social History:  Social History   Substance Use Topics     Smoking status: Never Smoker     Smokeless tobacco: Never Used     Alcohol use Not on file   lives college campus, studying to become .     ROS:  Full review of systems taken with the help of the intake sheet. Otherwise a complete 14 point review of systems was taken and is negative unless stated in the history above.      Physical Exam:   Blood pressure 124/85, pulse 87, height 1.499 m (4' 11\"), weight 63 kg (139 lb).    General: well appearing, no acute distress, pleasant and conversant,   Mental Status/neuro: alert and oriented  Skull: microcephalia  Face: symmetrical, normal facial color  Eyes: anicteric, PERRL, no proptosis or lid lag  Neck: suppler, no lymphadenopahty  Thyroid: normal size and texture, no nodule palpable, no bruits  Chest : No hair growth , no gynecomastia   heart: regular rhythm, S1S2, no murmur appreciated  Lung: clear to auscultation bilaterally  Abdomen: soft, NT/ND, no hepatomegaly  Testicular exam: 15-20 mL each, symmetrical.  Legs: no swelling or edema        Labs : I reviewed data from epic and extract and summarize the pertinent data here.   Lab Results   Component Value Date     09/19/2017      Lab Results   Component Value Date    POTASSIUM 3.4 09/19/2017     Lab Results   Component Value Date    CHLORIDE 103 09/19/2017     Lab Results   Component Value Date    ALCIDES 9.6 09/19/2017     Lab Results   Component Value Date    CO2 28 09/19/2017     Lab Results   Component Value Date    BUN 11 " 09/19/2017     Lab Results   Component Value Date    CR 0.64 09/19/2017     Lab Results   Component Value Date    GLC 76 09/19/2017     Lab Results   Component Value Date    TSH 0.06 09/19/2017     Lab Results   Component Value Date    T4 1.37 09/19/2017    T4 11.9 12/30/2009       Lab Results   Component Value Date    LH 5.9 09/19/2017     Lab Results   Component Value Date    FSH 7.8 09/19/2017         MRI Brain: I personally reviewed the original images and agree with the below reports.   Results for orders placed during the hospital encounter of 09/19/17   MRI Brain w & w/o contrast with susceptibility weighted imaging    Narrative  MR BRAIN W/O & W CONTRAST 9/19/2017 11:36 AM    History: Medulloblastoma diagnosed on 02/07/2008 at 7 years of age.  ?Initial surgical resection of his tumor with suboccipital craniotomy  and near total tumor resection on 02/07/2008.  ??  Patient was treated with subsequent chemo therapy and radiation  therapy.    ??  Comparison: Head MRI 9/20/2016, 9/30/2015 and 2/8/2008    Technique: Multiplanar T1-weighted, axial FLAIR, and susceptibility  images were obtained without intravenous contrast. Following  intravenous gadolinium-based contrast administration, axial  T2-weighted, diffusion, and T1-weighted images (in multiple planes)  were obtained.    Contrast dose: 6ml iv gadavist    Findings: Postsurgical changes of suboccipital craniotomy and  resection of fourth ventricle medulloblastoma. Hemosiderin staining  within the surgical cavity. Unchanged appearance of the resection  cavity with accompanying atrophic changes of the cerebellar vermis and  right cerebellar hemisphere likely secondary to a combination of  radiotherapy and surgery. On postcontrast series there is no enhancing  lesion to suggest residual lesion or recurrence. No abnormal  restricted diffusion within the surgical site to suggest tumor  recurrence. On susceptibility weighted imaging, there are stable  scattered  punctate foci of susceptibility artifact throughout the  subcortical and periventricular white matter. These may represent  chronic microhemorrhages versus post radiation cavernomas.    Compared to previous MRI exams, there is no intracranial hemorrhage  mass effect or new enhancing lesion. Ventricular system appears stable  in size. Patent major flow voids. Venous sinuses appear normal.  Orbital structures are grossly unremarkable.    Again noted subtle increased T1 signal on noncontrast T1-weighted  images within the left dentate nucleus which may represent  accumulation of gadolinium over the years.      Impression Impression:  1. Postsurgical changes of posterior fossa medulloblastoma resection  without any evidence of tumor recurrence.    2. Unchanged scattered foci of susceptibility artifact consistent with  either chronic microhemorrhages versus postradiation cavernomas.    3. Unchanged T1 signal in the basal ganglia and left dentate nucleus  which may represent accumulated gadolinium.    I have personally reviewed the examination and initial interpretation  and I agree with the findings.    FREDERICK SOTO MD         Assessment and Plan  17 year old male with meduloblastoma, secondary hypothyroidism, growth hormone deficiency    #Growth hormone deficiency  Bone age match with his actual age in 2015, since then discontinued growth hormone injection and patient has been doing well.  - IGF1    # Gonado axis  Last testosterone level was 349, almost optimal without testosterone treatment.  Denied low energy or muscle weakness.   Also no significant gonadal atrophy.   We will continue to monitor for now    #Secondary hypothyroidism    - TSH, free T4 today  -Continue current levothyroxine 125 mcg    # Other pituitary axis  Am cortisol    - RTC with me in 1 year    I spent 45 minutes with this patient face to face and explained the conditions and plans (more than 50% of time was counseling/coordination of care,  mamagement of pituitary insufficiency, discussed blood work plan) . The patient understood and is satisfied with today's visit. Return to clinic with me in 1 year.         Karen Caruso MD  Staff Physician  Endocrinology and Metabolism  License: [id number]

## 2018-09-18 NOTE — LETTER
9/18/2018     RE: David Beaulieu  [address]  [place] [address]     Dear Colleague,    Thank you for referring your patient, David Beaulieu, to the [facility] ENDOCRINOLOGY at [facility]. Please see a copy of my visit note below.                                                                               - Endocrinology Initial Consultation -    Reason for visit/consult: Gross hormone deficiency,  secondary hypothyroidism.    Primary care provider: Christian Youssef    HPI: A 17-year-old male here for transition care from pediatric endocrinologist for his long history of growth hormone deficiency, secondary hypogonadism, secondary hypothyroidism.  Patient came with his father. Last seen by Dr. Higgins in 9/2015.   Patient was diagnosed medulloblastoma at age 7 with a symptom of a headache and vomiting, he underwent brain surgery followed by chemotherapy and radiation therapy.  Patient developed gross hormone deficiency which was confirmed by biochemical test, was on growth hormone injection since end of 3/2010. Growth hormone stimulation testing 03/17/2010 showed a peak response following clonidine of 3 and arginine of 3.4 consistent with severe growth hormone deficiency. He was started on growth hormone therapy at the end of 03/2010. GH was discontinued in 4/2015. He did not have symptoms such as low energy, myalgia, arthralgia after discontinuing growth hormone injection.  He also has secondary hypothyroidism which he is currently on levothyroxine 125 mcg with good compliance.     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   Energy level: ok    Past Medical/Surgical History:  Past Medical History:   Diagnosis Date     Cancer (H)      No past surgical history on file.    Allergies:  Allergies   Allergen Reactions     Amoxicillin Hives     SUSR       Current Medications   Current Outpatient  "Prescriptions   Medication     Acetaminophen (TYLENOL CHILDRENS PO)     cetirizine (ZYRTEC) 10 MG tablet     fluticasone (FLONASE) 50 MCG/ACT nasal spray     ibuprofen (ADVIL,MOTRIN) 200 MG tablet     levothyroxine (SYNTHROID/LEVOTHROID) 125 MCG tablet     methylphenidate (CONCERTA) 18 MG CR tablet     pantoprazole (PROTONIX) 20 MG EC tablet     No current facility-administered medications for this visit.        Family History:  Family History   Problem Relation Age of Onset     Cerebrovascular Disease Mother      Childhood Heart Surgery Father      PDA s/p repair     Hypertension Father    maternal aunt: ovarian cancer, maternal grandmother: lung cancer, maternal grandfather: prostate cancer    Social History:  Social History   Substance Use Topics     Smoking status: Never Smoker     Smokeless tobacco: Never Used     Alcohol use Not on file   lives college campus, studying to become .     ROS:  Full review of systems taken with the help of the intake sheet. Otherwise a complete 14 point review of systems was taken and is negative unless stated in the history above.      Physical Exam:   Blood pressure 124/85, pulse 87, height 1.499 m (4' 11\"), weight 63 kg (139 lb).    General: well appearing, no acute distress, pleasant and conversant,   Mental Status/neuro: alert and oriented  Skull: microcephalia  Face: symmetrical, normal facial color  Eyes: anicteric, PERRL, no proptosis or lid lag  Neck: suppler, no lymphadenopahty  Thyroid: normal size and texture, no nodule palpable, no bruits  Chest : No hair growth , no gynecomastia   heart: regular rhythm, S1S2, no murmur appreciated  Lung: clear to auscultation bilaterally  Abdomen: soft, NT/ND, no hepatomegaly  Testicular exam: 15-20 mL each, symmetrical.  Legs: no swelling or edema        Labs : I reviewed data from epic and extract and summarize the pertinent data here.   Lab Results   Component Value Date     09/19/2017      Lab Results   Component " Value Date    POTASSIUM 3.4 09/19/2017     Lab Results   Component Value Date    CHLORIDE 103 09/19/2017     Lab Results   Component Value Date    ALCIDES 9.6 09/19/2017     Lab Results   Component Value Date    CO2 28 09/19/2017     Lab Results   Component Value Date    BUN 11 09/19/2017     Lab Results   Component Value Date    CR 0.64 09/19/2017     Lab Results   Component Value Date    GLC 76 09/19/2017     Lab Results   Component Value Date    TSH 0.06 09/19/2017     Lab Results   Component Value Date    T4 1.37 09/19/2017    T4 11.9 12/30/2009       Lab Results   Component Value Date    LH 5.9 09/19/2017     Lab Results   Component Value Date    FSH 7.8 09/19/2017         MRI Brain: I personally reviewed the original images and agree with the below reports.   Results for orders placed during the hospital encounter of 09/19/17   MRI Brain w & w/o contrast with susceptibility weighted imaging    Narrative  MR BRAIN W/O & W CONTRAST 9/19/2017 11:36 AM    History: Medulloblastoma diagnosed on 02/07/2008 at 7 years of age.  ?Initial surgical resection of his tumor with suboccipital craniotomy  and near total tumor resection on 02/07/2008.  ??  Patient was treated with subsequent chemo therapy and radiation  therapy.    ??  Comparison: Head MRI 9/20/2016, 9/30/2015 and 2/8/2008    Technique: Multiplanar T1-weighted, axial FLAIR, and susceptibility  images were obtained without intravenous contrast. Following  intravenous gadolinium-based contrast administration, axial  T2-weighted, diffusion, and T1-weighted images (in multiple planes)  were obtained.    Contrast dose: 6ml iv gadavist    Findings: Postsurgical changes of suboccipital craniotomy and  resection of fourth ventricle medulloblastoma. Hemosiderin staining  within the surgical cavity. Unchanged appearance of the resection  cavity with accompanying atrophic changes of the cerebellar vermis and  right cerebellar hemisphere likely secondary to a combination  of  radiotherapy and surgery. On postcontrast series there is no enhancing  lesion to suggest residual lesion or recurrence. No abnormal  restricted diffusion within the surgical site to suggest tumor  recurrence. On susceptibility weighted imaging, there are stable  scattered punctate foci of susceptibility artifact throughout the  subcortical and periventricular white matter. These may represent  chronic microhemorrhages versus post radiation cavernomas.    Compared to previous MRI exams, there is no intracranial hemorrhage  mass effect or new enhancing lesion. Ventricular system appears stable  in size. Patent major flow voids. Venous sinuses appear normal.  Orbital structures are grossly unremarkable.    Again noted subtle increased T1 signal on noncontrast T1-weighted  images within the left dentate nucleus which may represent  accumulation of gadolinium over the years.      Impression Impression:  1. Postsurgical changes of posterior fossa medulloblastoma resection  without any evidence of tumor recurrence.    2. Unchanged scattered foci of susceptibility artifact consistent with  either chronic microhemorrhages versus postradiation cavernomas.    3. Unchanged T1 signal in the basal ganglia and left dentate nucleus  which may represent accumulated gadolinium.    I have personally reviewed the examination and initial interpretation  and I agree with the findings.    FREDERICK SOTO MD         Assessment and Plan  17 year old male with meduloblastoma, secondary hypothyroidism, growth hormone deficiency    #Growth hormone deficiency  Bone age match with his actual age in 2015, since then discontinued growth hormone injection and patient has been doing well.  - IGF1    # Gonado axis  Last testosterone level was 349, almost optimal without testosterone treatment.  Denied low energy or muscle weakness.   Also no significant gonadal atrophy.   We will continue to monitor for now    #Secondary hypothyroidism    -  TSH, free T4 today  -Continue current levothyroxine 125 mcg    # Other pituitary axis  Am cortisol    - RTC with me in 1 year    I spent 45 minutes with this patient face to face and explained the conditions and plans (more than 50% of time was counseling/coordination of care, mamagement of pituitary insufficiency, discussed blood work plan) . The patient understood and is satisfied with today's visit. Return to clinic with me in 1 year.       Karen Caruso MD  Staff Physician  Endocrinology and Metabolism  License: [id number]

## 2018-09-18 NOTE — MR AVS SNAPSHOT
"              After Visit Summary   9/18/2018    David Beaulieu    MRN: [id number]           Patient Information     Date Of Birth          2000        Visit Information        Provider Department      9/18/2018 2:00 PM Karen Caruso MD [facility] Endocrinology        Today's Diagnoses     Growth hormone deficiency from radiation (H)    -  1    Medulloblastoma (H)        Secondary hypothyroidism           Follow-ups after your visit        Follow-up notes from your care team     Return in about 1 year (around 9/18/2019).      Who to contact     Please call your clinic at [phone] to:    Ask questions about your health    Make or cancel appointments    Discuss your medicines    Learn about your test results    Speak to your doctor            Additional Information About Your Visit        [employer] Information     [employer] gives you secure access to your electronic health record. If you see a primary care provider, you can also send messages to your care team and make appointments. If you have questions, please call your primary care clinic.  If you do not have a primary care provider, please call [phone] and they will assist you.      [employer] is an electronic gateway that provides easy, online access to your medical records. With [employer], you can request a clinic appointment, read your test results, renew a prescription or communicate with your care team.     To access your existing account, please contact your [facility] or call [phone] for assistance.        Care EveryWhere ID     This is your Care EveryWhere ID. This could be used by other organizations to access your [place] medical records  [id number]        Your Vitals Were     Pulse Height BMI (Body Mass Index)             87 1.499 m (4' 11\") 28.07 kg/m2          Blood Pressure from Last 3 Encounters:   09/18/18 124/85   09/18/18 124/85   09/19/17 126/82    Weight from Last 3 Encounters:   09/18/18 63 kg (139 " lb) (23 %)*   09/18/18 63.5 kg (139 lb 15.9 oz) (25 %)*   09/19/17 63.1 kg (139 lb 1.8 oz) (28 %)*     * Growth percentiles are based on [facility] 2-20 Years data.                 Today's Medication Changes          These changes are accurate as of 9/18/18 11:59 PM.  If you have any questions, ask your nurse or doctor.               These medicines have changed or have updated prescriptions.        Dose/Directions    methylphenidate ER 18 MG CR tablet   Commonly known as:  CONCERTA   This may have changed:  how much to take   Used for:  Medulloblastoma (H)        Dose:  18 mg   Take 1 tablet by mouth every morning.   Quantity:  30 tablet   Refills:  0                Primary Care Provider Office Phone # Fax #    Christian Dominic Youssef [phone]       [facility] [address] [place] [address]        Equal Access to Services     NA GALEANO AH: Hadii nan medina hadasho Sojose miguel, waaxda luqadaha, qaybta kaalmada adeegyada, waxay anne mariein haymiguen paula locke . So [facility] [phone].    ATENCIÓN: Si habla español, tiene a mckay disposición servicios gratuitos de asistencia lingüística. Margarita al [phone].    We comply with applicable federal civil rights laws and [place] laws. We do not discriminate on the basis of race, color, national origin, age, disability, sex, sexual orientation, or gender identity.            Thank you!     Thank you for choosing [facility] ENDOCRINOLOGY  for your care. Our goal is always to provide you with excellent care. Hearing back from our patients is one way we can continue to improve our services. Please take a few minutes to complete the written survey that you may receive in the mail after your visit with us. Thank you!             Your Updated Medication List - Protect others around you: Learn how to safely use, store and throw away your medicines at www.disposemymeds.org.          This list is accurate as of 9/18/18 11:59 PM.  Always use your most recent  med list.                   Brand Name Dispense Instructions for use Diagnosis    cetirizine 10 MG tablet    zyrTEC     Take 10 mg by mouth        fluticasone 50 MCG/ACT spray    FLONASE    16 g    Spray 2 sprays into both nostrils daily    Medulloblastoma (H), Nasal congestion       ibuprofen 200 MG tablet    ADVIL/MOTRIN     Take 1 tablet by mouth every 6 hours as needed.        levothyroxine 125 MCG tablet    SYNTHROID/LEVOTHROID    90 tablet    Take 1 tablet (125 mcg) by mouth daily    Panhypopituitarism (H), TSH deficiency       methylphenidate ER 18 MG CR tablet    CONCERTA    30 tablet    Take 1 tablet by mouth every morning.    Medulloblastoma (H)       pantoprazole 20 MG EC tablet    PROTONIX     Take 20 mg by mouth        TYLENOL CHILDRENS PO      Take 1 tablet by mouth every 4 hours as needed. CHEW

## 2018-09-19 LAB — CORTIS SERPL-MCNC: 13.6 UG/DL (ref 4–22)

## 2018-09-22 LAB — IGF-I BLD-MCNC: 232 NG/ML (ref 137–428)

## 2018-09-22 NOTE — PROGRESS NOTES
Dear David     Here is your results. IGF1 hormone (growth hormone related) is within normal limits.  Please continue the plan we discussed.  Please call nursing line at [phone] if you have any questions.    Take care  Karen Caruso MD

## 2018-09-24 LAB — CORTISOL: 16.5 UG/DL

## 2018-12-10 DIAGNOSIS — E23.0 PANHYPOPITUITARISM (H): ICD-10-CM

## 2018-12-10 DIAGNOSIS — E03.8 TSH DEFICIENCY: ICD-10-CM

## 2018-12-10 RX ORDER — LEVOTHYROXINE SODIUM 125 UG/1
125 TABLET ORAL DAILY
Qty: 90 TABLET | Refills: 3 | Status: SHIPPED | OUTPATIENT
Start: 2018-12-10 | End: 2019-12-26

## 2018-12-10 RX ORDER — LEVOTHYROXINE SODIUM 125 UG/1
125 TABLET ORAL DAILY
COMMUNITY
Start: 2015-03-10 | End: 2021-09-21 | Stop reason: DRUGHIGH

## 2019-09-17 ENCOUNTER — OFFICE VISIT (OUTPATIENT)
Dept: CARE COORDINATION | Facility: CLINIC | Age: 19
End: 2019-09-17

## 2019-09-17 ENCOUNTER — ANCILLARY PROCEDURE (OUTPATIENT)
Dept: BONE DENSITY | Facility: CLINIC | Age: 19
End: 2019-09-17
Attending: INTERNAL MEDICINE
Payer: MEDICAID

## 2019-09-17 ENCOUNTER — TELEPHONE (OUTPATIENT)
Dept: PEDIATRIC HEMATOLOGY/ONCOLOGY | Facility: CLINIC | Age: 19
End: 2019-09-17

## 2019-09-17 ENCOUNTER — OFFICE VISIT (OUTPATIENT)
Dept: PEDIATRIC HEMATOLOGY/ONCOLOGY | Facility: CLINIC | Age: 19
End: 2019-09-17
Attending: NURSE PRACTITIONER
Payer: MEDICAID

## 2019-09-17 ENCOUNTER — HOSPITAL ENCOUNTER (OUTPATIENT)
Dept: CARDIOLOGY | Facility: CLINIC | Age: 19
Discharge: HOME | End: 2019-09-17
Attending: NURSE PRACTITIONER | Admitting: NURSE PRACTITIONER
Payer: MEDICAID

## 2019-09-17 ENCOUNTER — HOSPITAL ENCOUNTER (OUTPATIENT)
Dept: MRI IMAGING | Facility: CLINIC | Age: 19
End: 2019-09-17
Attending: NURSE PRACTITIONER
Payer: MEDICAID

## 2019-09-17 ENCOUNTER — INFUSION THERAPY VISIT (OUTPATIENT)
Dept: INFUSION THERAPY | Facility: CLINIC | Age: 19
End: 2019-09-17
Attending: NURSE PRACTITIONER
Payer: MEDICAID

## 2019-09-17 ENCOUNTER — OFFICE VISIT (OUTPATIENT)
Dept: ENDOCRINOLOGY | Facility: CLINIC | Age: 19
End: 2019-09-17
Payer: MEDICAID

## 2019-09-17 VITALS
SYSTOLIC BLOOD PRESSURE: 118 MMHG | HEIGHT: 60 IN | WEIGHT: 143 LBS | HEART RATE: 82 BPM | BODY MASS INDEX: 28.07 KG/M2 | DIASTOLIC BLOOD PRESSURE: 76 MMHG

## 2019-09-17 VITALS
OXYGEN SATURATION: 100 % | HEIGHT: 60 IN | WEIGHT: 143.08 LBS | RESPIRATION RATE: 20 BRPM | TEMPERATURE: 98.1 F | HEART RATE: 82 BPM | SYSTOLIC BLOOD PRESSURE: 118 MMHG | BODY MASS INDEX: 28.09 KG/M2 | DIASTOLIC BLOOD PRESSURE: 76 MMHG

## 2019-09-17 DIAGNOSIS — Z71.9 ENCOUNTER FOR COUNSELING: Primary | ICD-10-CM

## 2019-09-17 DIAGNOSIS — E23.0 PANHYPOPITUITARISM (H): ICD-10-CM

## 2019-09-17 DIAGNOSIS — M81.0 OSTEOPOROSIS, UNSPECIFIED OSTEOPOROSIS TYPE, UNSPECIFIED PATHOLOGICAL FRACTURE PRESENCE: ICD-10-CM

## 2019-09-17 DIAGNOSIS — Z92.21 STATUS POST CHEMOTHERAPY: ICD-10-CM

## 2019-09-17 DIAGNOSIS — Z92.3 S/P RADIATION THERAPY: ICD-10-CM

## 2019-09-17 DIAGNOSIS — C71.6 MEDULLOBLASTOMA OF CEREBELLUM (H): ICD-10-CM

## 2019-09-17 DIAGNOSIS — E23.0 PITUITARY DWARFISM (H): ICD-10-CM

## 2019-09-17 DIAGNOSIS — C71.6 MEDULLOBLASTOMA OF CEREBELLUM (H): Primary | ICD-10-CM

## 2019-09-17 DIAGNOSIS — C71.6 MEDULLOBLASTOMA (H): Primary | ICD-10-CM

## 2019-09-17 DIAGNOSIS — E29.1 HYPOGONADISM MALE: ICD-10-CM

## 2019-09-17 DIAGNOSIS — E29.1 HYPOGONADISM MALE: Primary | ICD-10-CM

## 2019-09-17 DIAGNOSIS — Z91.89 AT RISK FOR CARDIOMYOPATHY: ICD-10-CM

## 2019-09-17 LAB
ALBUMIN SERPL-MCNC: 4.2 G/DL (ref 3.4–5)
ALP SERPL-CCNC: 82 U/L (ref 40–150)
ALT SERPL W P-5'-P-CCNC: 29 U/L (ref 0–70)
ANION GAP SERPL CALCULATED.3IONS-SCNC: 5 MMOL/L (ref 3–14)
AST SERPL W P-5'-P-CCNC: 15 U/L (ref 0–45)
BASOPHILS # BLD AUTO: 0 10E9/L (ref 0–0.2)
BASOPHILS NFR BLD AUTO: 0.5 %
BILIRUB SERPL-MCNC: 0.3 MG/DL (ref 0.2–1.3)
BUN SERPL-MCNC: 10 MG/DL (ref 7–30)
CALCIUM SERPL-MCNC: 9.5 MG/DL (ref 8.5–10.1)
CHLORIDE SERPL-SCNC: 103 MMOL/L (ref 94–109)
CO2 SERPL-SCNC: 31 MMOL/L (ref 20–32)
CORTIS SERPL-MCNC: 12.8 UG/DL (ref 4–22)
CREAT SERPL-MCNC: 0.64 MG/DL (ref 0.66–1.25)
DIFFERENTIAL METHOD BLD: NORMAL
EOSINOPHIL # BLD AUTO: 0.1 10E9/L (ref 0–0.7)
EOSINOPHIL NFR BLD AUTO: 1.2 %
ERYTHROCYTE [DISTWIDTH] IN BLOOD BY AUTOMATED COUNT: 13 % (ref 10–15)
GFR SERPL CREATININE-BSD FRML MDRD: >90 ML/MIN/{1.73_M2}
GLUCOSE SERPL-MCNC: 85 MG/DL (ref 70–99)
HBA1C MFR BLD: 5.4 % (ref 0–5.6)
HCT VFR BLD AUTO: 44.6 % (ref 40–53)
HGB BLD-MCNC: 14.2 G/DL (ref 13.3–17.7)
IMM GRANULOCYTES # BLD: 0 10E9/L (ref 0–0.4)
IMM GRANULOCYTES NFR BLD: 0.3 %
LYMPHOCYTES # BLD AUTO: 2.7 10E9/L (ref 0.8–5.3)
LYMPHOCYTES NFR BLD AUTO: 41.3 %
MCH RBC QN AUTO: 27.1 PG (ref 26.5–33)
MCHC RBC AUTO-ENTMCNC: 31.8 G/DL (ref 31.5–36.5)
MCV RBC AUTO: 85 FL (ref 78–100)
MONOCYTES # BLD AUTO: 0.4 10E9/L (ref 0–1.3)
MONOCYTES NFR BLD AUTO: 5.3 %
NEUTROPHILS # BLD AUTO: 3.4 10E9/L (ref 1.6–8.3)
NEUTROPHILS NFR BLD AUTO: 51.4 %
NRBC # BLD AUTO: 0 10*3/UL
NRBC BLD AUTO-RTO: 0 /100
PLATELET # BLD AUTO: 304 10E9/L (ref 150–450)
POTASSIUM SERPL-SCNC: 3.5 MMOL/L (ref 3.4–5.3)
PROT SERPL-MCNC: 8.1 G/DL (ref 6.8–8.8)
RBC # BLD AUTO: 5.24 10E12/L (ref 4.4–5.9)
SODIUM SERPL-SCNC: 139 MMOL/L (ref 133–144)
T4 FREE SERPL-MCNC: 1.14 NG/DL (ref 0.76–1.46)
TSH SERPL DL<=0.005 MIU/L-ACNC: 4.59 MU/L (ref 0.4–4)
WBC # BLD AUTO: 6.6 10E9/L (ref 4–11)

## 2019-09-17 PROCEDURE — 80053 COMPREHEN METABOLIC PANEL: CPT | Performed by: NURSE PRACTITIONER

## 2019-09-17 PROCEDURE — 84403 ASSAY OF TOTAL TESTOSTERONE: CPT | Performed by: INTERNAL MEDICINE

## 2019-09-17 PROCEDURE — 85025 COMPLETE CBC W/AUTO DIFF WBC: CPT | Performed by: NURSE PRACTITIONER

## 2019-09-17 PROCEDURE — G0463 HOSPITAL OUTPT CLINIC VISIT: HCPCS | Mod: ZF

## 2019-09-17 PROCEDURE — 93306 TTE W/DOPPLER COMPLETE: CPT

## 2019-09-17 PROCEDURE — 25500064 ZZH RX 255 OP 636: Performed by: NURSE PRACTITIONER

## 2019-09-17 PROCEDURE — 84305 ASSAY OF SOMATOMEDIN: CPT | Performed by: NURSE PRACTITIONER

## 2019-09-17 PROCEDURE — 70553 MRI BRAIN STEM W/O & W/DYE: CPT

## 2019-09-17 PROCEDURE — 82533 TOTAL CORTISOL: CPT | Performed by: NURSE PRACTITIONER

## 2019-09-17 PROCEDURE — A9585 GADOBUTROL INJECTION: HCPCS | Performed by: NURSE PRACTITIONER

## 2019-09-17 PROCEDURE — 36415 COLL VENOUS BLD VENIPUNCTURE: CPT

## 2019-09-17 PROCEDURE — 84439 ASSAY OF FREE THYROXINE: CPT | Performed by: NURSE PRACTITIONER

## 2019-09-17 PROCEDURE — 25000128 H RX IP 250 OP 636: Performed by: NURSE PRACTITIONER

## 2019-09-17 PROCEDURE — 83036 HEMOGLOBIN GLYCOSYLATED A1C: CPT | Performed by: NURSE PRACTITIONER

## 2019-09-17 PROCEDURE — 84443 ASSAY THYROID STIM HORMONE: CPT | Performed by: NURSE PRACTITIONER

## 2019-09-17 RX ORDER — TESTOSTERONE 20.25 MG/1.25G
1 GEL TOPICAL DAILY
Qty: 75 G | Refills: 3 | Status: SHIPPED | OUTPATIENT
Start: 2019-09-17 | End: 2020-09-22

## 2019-09-17 RX ORDER — GADOBUTROL 604.72 MG/ML
7.5 INJECTION INTRAVENOUS ONCE
Status: COMPLETED | OUTPATIENT
Start: 2019-09-17 | End: 2019-09-17

## 2019-09-17 RX ADMIN — SODIUM CHLORIDE 80 ML: 9 INJECTION, SOLUTION INTRAVENOUS at 10:06

## 2019-09-17 RX ADMIN — GADOBUTROL 6 ML: 604.72 INJECTION INTRAVENOUS at 10:05

## 2019-09-17 ASSESSMENT — PAIN SCALES - GENERAL
PAINLEVEL: NO PAIN (0)
PAINLEVEL: NO PAIN (0)

## 2019-09-17 ASSESSMENT — MIFFLIN-ST. JEOR
SCORE: 1490.27
SCORE: 1496.5

## 2019-09-17 NOTE — TELEPHONE ENCOUNTER
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  His tumor was located in his posterior fossa.  He was treated at the [facility] under the direction of Dr. Grant Rosenbaum.  David had initial surgical resection of his tumor which was as follows:   1.  Suboccipital craniotomy and near total tumor resection on 02/07/2008 with Dr. Wilber Valenzuela at the [facility].        David was treated as per the [facility] protocol [id number] and received the following chemotherapy:   1.  Vincristine intravenously.   2.  Cis-retinoic acid orally.   3.  Cisplatin intravenously with cumulative dose of 394 mg/meter squared.   4.  Cyclophosphamide intravenously with a cumulative dose of 12 grams/meter squared.      David also received radiation therapy as part of his treatment which is as follows:   1.  Whole brain radiation which started on 03/04/2008 and was completed on 03/31/2008 in 20 fractions, for a total dose of 3600 centigray.   2.  Spine radiation which started on 03/04/2008 was completed on 03/31/2008 in 20 fractions, for a total dose of 3600 centigray.   3.  Posterior fossa boost, which started on 04/01/2008 and was completed on 04/15/2008 in 11 fractions, for a total of 1972 centigray.   4.  Total dose to  posterior fossa is 5572 centigray.         Pathology: From biopsy in 2008 Medulloblastoma, More than 50% of tumor cell nuclei are positive with Ki-67  immunostaining.  Many tumor cell nuclei are weakly positive for P53 and  scattered tumor cells are strongly positive.    Discussion:  There is one Cavernoma on the right side. Tumor bed has normal vascular changes. Some Cerebellar atrophy.    Tumor Board Recommendation: Return for follow up and repeat MRI in 2 years.                                                                          Documentation / Disclaimer CNS Tumor Board Note   CNS tumor board recommendations do not override what is determined to be reasonable care and treatment, which is dependent on the circumstances of a patient's case; the patient's medical, social, and personal concerns; and the clinical judgment of the oncologist [physician].

## 2019-09-17 NOTE — NURSING NOTE
Chief Complaint   Patient presents with     RECHECK     growth hormone deficiency      Mary Brown CMA

## 2019-09-17 NOTE — PROGRESS NOTES
[facility]                                                                           - Endocrinology Follow up -    Reason for visit/consult: Gross hormone deficiency,  secondary hypothyroidism.    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  Denied low energy or muscle weakness. Also no significant gonadal atrophy.   But he has low bone dentisy in Z score, I think very small dose of tesotsterone will benefit his quality of life.     - total testosterone today    - Testosterone gel 1 pump daily to start this year.     # Low bone density  Last bone density in 2017, Z score spine -2.2.    - repeat bone density this year.    - calcium citrate 2 tabs (elemental Ca 630 mg, vitamin D 500IU)       #Growth hormone deficiency  Bone age match with his actual age in 2015, since then discontinued growth hormone injection and patient has been doing well.  - IGF1 pending today      #Secondary hypothyroidism  TSH slight elevated, subclinical hypothryoidism (TSH 4.59) but clinically he is doing great. I talked about medication compliance and this year no change dose of levothyroxine.     -Continue current levothyroxine 125 mcg    # Other pituitary axis  Am cortisol pending, no clinical signs of adrenal insufficiency.     - RTC with me in 1 year    I spent 30 minutes with this patient face to face and explained the conditions and plans (more than 50% of time was counseling/coordination of care, mamagement of pituitary insufficiency, discussed blood work plan) . The patient understood and is satisfied with today's visit. Return to clinic with me in 1 year.         Karen Caruso MD  Staff Physician  Endocrinology and Metabolism  License: [id number]    Interval History as of 9/17/2019 : Patient has been doing well. Last seen  1 year ago. Medication compliance good. New event includes: non significant . Eating wel, stable body weight, good actvity level.   HPI: A 17-year-old male here for transition care from pediatric endocrinologist for his long history of growth hormone deficiency, secondary hypogonadism, secondary hypothyroidism.  Patient came with his father. Last seen by Dr. Higgins in 9/2015.   Patient was diagnosed medulloblastoma at age 7 with a symptom of a headache and vomiting, he underwent brain surgery followed by chemotherapy and radiation therapy.  Patient developed gross hormone deficiency which was confirmed by biochemical test, was on growth hormone injection since end of 3/2010. Growth hormone stimulation testing 03/17/2010 showed a peak response following clonidine of 3 and arginine of 3.4 consistent with severe growth hormone deficiency. He was started on growth hormone therapy at the end of 03/2010. GH was discontinued in 4/2015. He did not have symptoms such as low energy, myalgia, arthralgia after discontinuing growth hormone injection.  He also has secondary hypothyroidism which he is currently on levothyroxine 125 mcg with good compliance.     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   Energy level: ok    Past Medical/Surgical History:  Past Medical History:   Diagnosis Date     Cancer (H)      No past surgical history on file.    Allergies:  Allergies   Allergen Reactions     Amoxicillin Hives     SUSR       Current Medications   Current Outpatient Medications   Medication     Acetaminophen (TYLENOL CHILDRENS PO)     cetirizine (ZYRTEC) 10 MG tablet     fluticasone (FLONASE) 50 MCG/ACT nasal spray     ibuprofen (ADVIL,MOTRIN) 200 MG tablet     levothyroxine (SYNTHROID/LEVOTHROID) 125 MCG tablet     levothyroxine (SYNTHROID/LEVOTHROID) 125 MCG tablet     methylphenidate (CONCERTA) 18 MG CR tablet     pantoprazole (PROTONIX) 20 MG EC tablet     No  "current facility-administered medications for this visit.        Family History:  Family History   Problem Relation Age of Onset     Cerebrovascular Disease Mother      Childhood Heart Surgery Father         PDA s/p repair     Hypertension Father    maternal aunt: ovarian cancer, maternal grandmother: lung cancer, maternal grandfather: prostate cancer    Social History:  Social History     Tobacco Use     Smoking status: Never Smoker     Smokeless tobacco: Never Used   Substance Use Topics     Alcohol use: Not on file   lives college campus, studying to become .     ROS:  Full review of systems taken with the help of the intake sheet. Otherwise a complete 14 point review of systems was taken and is negative unless stated in the history above.      Physical Exam:   Blood pressure 118/76, pulse 82, height 1.499 m (4' 11\"), weight 64.9 kg (143 lb).    General: well appearing, no acute distress, pleasant and conversant,   Mental Status/neuro: alert and oriented  Skull: microcephalia  Face: symmetrical, normal facial color  Eyes: anicteric, PERRL, no proptosis or lid lag  Neck: suppler, no lymphadenopahty  Thyroid: normal size and texture, no nodule palpable, no bruits  Chest : No hair growth , no gynecomastia   heart: regular rhythm, S1S2, no murmur appreciated  Lung: clear to auscultation bilaterally  Abdomen: soft, NT/ND, no hepatomegaly  Testicular exam: 15-20 mL each, symmetrical.  Legs: no swelling or edema        Labs : I reviewed data from epic and extract and summarize the pertinent data here.      Ref. Range 9/30/2015 12:20 9/20/2016 11:52 9/19/2017 12:09 9/18/2018 15:46 9/17/2019 13:45   Testosterone Total Latest Ref Range: 300 - 1,200 ng/dL 308 295 (L) 349     TSH Latest Ref Range: 0.40 - 4.00 mU/L <0.01 (L) 0.02 (L) 0.06 (L) 5.38 (H) 4.59 (H)   T4 Free Latest Ref Range: 0.76 - 1.46 ng/dL 1.60 (H) 1.46 1.37 1.04 1.14   Ins Growth Factor 1 Latest Ref Range: 137 - 428 ng/ml    232    Lutropin Latest " Ref Range: 1.5 - 9.3 IU/L 3.5 2.6 5.9     FSH Latest Ref Range: 0.7 - 10.8 IU/L 9.3 7.9 7.8         MRI Brain: I personally reviewed the original images and agree with the below reports.   Results for orders placed during the hospital encounter of 09/19/17   MRI Brain w & w/o contrast with susceptibility weighted imaging    Narrative  MR BRAIN W/O & W CONTRAST 9/19/2017 11:36 AM    History: Medulloblastoma diagnosed on 02/07/2008 at 7 years of age.  ?Initial surgical resection of his tumor with suboccipital craniotomy  and near total tumor resection on 02/07/2008.  ??  Patient was treated with subsequent chemo therapy and radiation  therapy.    ??  Comparison: Head MRI 9/20/2016, 9/30/2015 and 2/8/2008    Technique: Multiplanar T1-weighted, axial FLAIR, and susceptibility  images were obtained without intravenous contrast. Following  intravenous gadolinium-based contrast administration, axial  T2-weighted, diffusion, and T1-weighted images (in multiple planes)  were obtained.    Contrast dose: 6ml iv gadavist    Findings: Postsurgical changes of suboccipital craniotomy and  resection of fourth ventricle medulloblastoma. Hemosiderin staining  within the surgical cavity. Unchanged appearance of the resection  cavity with accompanying atrophic changes of the cerebellar vermis and  right cerebellar hemisphere likely secondary to a combination of  radiotherapy and surgery. On postcontrast series there is no enhancing  lesion to suggest residual lesion or recurrence. No abnormal  restricted diffusion within the surgical site to suggest tumor  recurrence. On susceptibility weighted imaging, there are stable  scattered punctate foci of susceptibility artifact throughout the  subcortical and periventricular white matter. These may represent  chronic microhemorrhages versus post radiation cavernomas.    Compared to previous MRI exams, there is no intracranial hemorrhage  mass effect or new enhancing lesion. Ventricular system  appears stable  in size. Patent major flow voids. Venous sinuses appear normal.  Orbital structures are grossly unremarkable.    Again noted subtle increased T1 signal on noncontrast T1-weighted  images within the left dentate nucleus which may represent  accumulation of gadolinium over the years.      Impression Impression:  1. Postsurgical changes of posterior fossa medulloblastoma resection  without any evidence of tumor recurrence.    2. Unchanged scattered foci of susceptibility artifact consistent with  either chronic microhemorrhages versus postradiation cavernomas.    3. Unchanged T1 signal in the basal ganglia and left dentate nucleus  which may represent accumulated gadolinium.    I have personally reviewed the examination and initial interpretation  and I agree with the findings.    FREDERICK SOTO MD

## 2019-09-17 NOTE — PROGRESS NOTES
"  Long-Term Follow-up/Survivorship  Psychosocial Assessment    Assessment completed of living situation, support system, financial status, functional status, coping, stressors, need for resources and social work intervention provided as needed.     Diagnosis: The patient was diagnosed with Medulloblastoma in February of 2008 at the age of 7.      Provider: Erica Knight.     Presenting Information: David is a 18 year-old, male, who presented to the LTFU clinic on 9/17/19. He was accompanied by his father, Elliot.     Living Situation: David reported he is no longer attending college and is no longer living on campus. He is living independently in [place], [place]. David endorsed housing stability.       Transportation Mode: David reported he drives independently. He purchased a new vehicle and transportation barriers were not identified. For today's visit, David and Elliot took the light rail from [place] to the [facility]. This was in preparation for David doing this on his own next year as his father is trying to promote adult-centered care.       Family Constellation and Support Network: David's support consists of his parents, extended family, and friends. Support is reportedly adequate.     Interests/Activities: David enjoys playing video games and watching TV. David reported he also likes watching Netflix and finding new shows.      Cultural and Taoist Factors: The family identifies as Synagogue.      Insurance: Elliot reported they were successful in having David \"SMRT'ed\" through the [facility] and he now has medical assistance. Insurance coverage is adequate. David experienced renewal difficulties this year, but went to the [facility] to have this corrected. His Medicaid coverage has covered medical costs exceptionally well.      Employment/Financial: David reported he applied for social security, but was denied. He may re-apply in the future, but will continue to maintain employment for the time being. 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 David hopes to collect unemployment during the winter season and/or find another part-time position.       Legal: No legal involvement or concerns identified.      Patient Education/Development Level: David dropped out of school. He reported things were going okay, but felt he could advance in his career without obtaining a degree. 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 He endorsed nervousness when sharing this decision with his family, but stated everyone has been surprisinglysupportive. Writer offered to remain available should he chose to discuss returning to school at any point.      Mental/Chemical Health: David described his mood as being good. He was pleasant and talkative during our time together.  David does not have a history of mental health concerns to report. He has not seen a therapist and/or taken medication to assist with mood management in the past. Concerns were not identified.     Trauma History: No concerns identified.      Emotional/Social/Cognitive Effect: David seems to have adjusted well as a survivor.  He has good support from family and friends and denies any mental health concerns. Concerns were not identified.     Advanced Medical Directive (For 18 year old patients and emancipated minors only): David makes his own medical decisions and is not interested in a HCD at this time.      Assessment and Recommendations for the Team: David appears to be effectively transitioning to adult care. He currently lives independently, has been employed full-time in a seasonal position, and endorsed confidence in adult-centered care transitions. No psychosocial barriers were identified, but writer will remain available if anything were to change.     Community/Supportive Resources: n/a     Interventions:    1. Provide ongoing assessment of patient and family's level of coping.   2. Provide psychosocial supportive counseling and crisis intervention as needed.   3. Facilitate service linkage with hospital and community resources as needed.   4. Collaborate with healthcare team to meet patient and family's needs.      Plan:    provided a business card and encouraged the patient to call if any questions or concerns arise before next clinic visit.      JIMMY Woodward, [facility]  Clinical    Pediatric Outpatient Clinics/Long Term Follow-Up/Women s Health   [facility]   [email or web address]   Office: [phone]   Pager: [phone]    *No Letter

## 2019-09-17 NOTE — PROGRESS NOTES
David came to clinic today for a provider visit. And needed labs drawn. PIV in place from previous apt this morning. Labs drawn without issues from [facility] PIV and saline locked. Left in place for more apts this afternoon. Pt left clinic in stable condition with family after the visit.

## 2019-09-17 NOTE — NURSING NOTE
"Chief Complaint   Patient presents with     RECHECK     Patient is here today for a LTFU regarding Medulloblastoma of cerebellum     /88 (BP Location: Left arm, Patient Position: Chair, Cuff Size: Adult Regular)   Pulse 82   Temp 98.1  F (36.7  C) (Oral)   Resp 20   Ht 1.508 m (4' 11.37\")   Wt 64.9 kg (143 lb 1.3 oz)   SpO2 100%   BMI 28.54 kg/m      Rachelle Sharma, [facility]   September 17, 2019    "

## 2019-09-17 NOTE — PROGRESS NOTES
We had the pleasure of seeing your patient, David Beaulieu, at the [facility] Childhood Cancer Survivorship [facility].  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  His tumor was located in his posterior fossa.  He was treated at the [facility] under the direction of Dr. Grant Rosenbaum.  David had initial surgical resection of his tumor which was as follows:   1.  Suboccipital craniotomy and near total tumor resection on 02/07/2008 with Dr. Wilber Valenzuela at the [facility].        David was treated as per the [facility] protocol [id number] and received the following chemotherapy:   1.  Vincristine intravenously.   2.  Cis-retinoic acid orally.   3.  Cisplatin intravenously with cumulative dose of 394 mg/meter squared.   4.  Cyclophosphamide intravenously with a cumulative dose of 12 grams/meter squared.      David also received radiation therapy as part of his treatment which is as follows:   1.  Whole brain radiation which started on 03/04/2008 and was completed on 03/31/2008 in 20 fractions, for a total dose of 3600 centigray.   2.  Spine radiation which started on 03/04/2008 was completed on 03/31/2008 in 20 fractions, for a total dose of 3600 centigray.   3.  Posterior fossa boost, which started on 04/01/2008 and was completed on 04/15/2008 in 11 fractions, for a total of 1972 centigray.   4.  Total dose to posterior fossa is 5572 centigray.      David's acute and chronic late effects known to date include:   1.  Severe bilateral sensorineural hearing loss diagnosed on 08/21/2008- wears hearing aids.   2.  Growth deceleration found on 09/22/2009.   3.  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   4.  Lower extremity weakness diagnosed on 11/03/2009, for which he started physical therapy.   5.  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   6.   "Problems with executive functioning diagnosed on 10/25/2010.   7.  Slow processing speed diagnosed on 10/25/2010.   8.  Problems with fine motor skills diagnosed on 10/25/2010.   9.  Reduced spinal growth diagnosed on 07/24/2012.   10.  Problems with sustained attention diagnosed on 08/17/2012.   11.  Problems with emotional lability.   12.  Depression which was diagnosed on 12/03/2014.   13.  Left ventricular dysfunction diagnosed 9/2015       HISTORY OF PRESENT ILLNESS:  David reports to his long term follow up evaluation with his father.  He has been doing  well.  No current cardiovascular concerns.  He had an echo today that was normal.     He had local PFTs last year that were at the lower limits of normal.  He saw local pulmonary and there were no concerns.     He is seeing adult endocrinology, Dr. Caruso, yearly.  He has a visit today.   With regards to his attention, he is still taking Concerta.   He does have accommodations in school to get extra time for testing and preferred seating.  He did disclose that he is stopping going to college.  He says that he isn't \"book smart\" and \"school isn't for me.\"  He said that he was using his accommodations but he decided that he would rather work and he may not need a degree to do the job that he wants to do.   He last had neuropsych testing done in 12/2014.  David denies any headaches, vision changes.  He went for an eye doctor visit this year.  They said that his vision was mildly abnormal and he could use glasses but doesn't necessarily need them.  He doesn't need them for driving.      He denies any seizure-like activity or tremors.  No back pain, no fever.   With regards to his hearing, he received hearing aids 3 years ago.  He had his hearing tested last May 2017.  He is due for a hearing test locally.    Sleep and appetite good.  No skin changes.  He did have 2 cavities at the dentist last visit.  Those were filled.  He is due for another dental visit.   " "  REVIEW OF SYSTEMS:  Comprehensive review of systems was negative except as started above.      CURRENT MEDICATIONS:   Current Outpatient Medications   Medication     Acetaminophen (TYLENOL CHILDRENS PO)     cetirizine (ZYRTEC) 10 MG tablet     ibuprofen (ADVIL,MOTRIN) 200 MG tablet     levothyroxine (SYNTHROID/LEVOTHROID) 125 MCG tablet     methylphenidate (CONCERTA) 18 MG CR tablet     pantoprazole (PROTONIX) 20 MG EC tablet     fluticasone (FLONASE) 50 MCG/ACT nasal spray     levothyroxine (SYNTHROID/LEVOTHROID) 125 MCG tablet     No current facility-administered medications for this visit.           ALLERGIES:  Amoxicillin.      FAMILY HISTORY:     Family History   Problem Relation Age of Onset     Cerebrovascular Disease Mother      Childhood Heart Surgery Father         PDA s/p repair     Hypertension Father    No change         SOCIAL HISTORY:  David completed his 2nd year of college at [employer] in [place].  He has accommodations for preferrential seating and extra time for tests.  He lives in [place].  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   Met with social work today for routine assessment.       PHYSICAL EXAMINATION:     VITAL SIGNS:   /88 (BP Location: Left arm, Patient Position: Chair, Cuff Size: Adult Regular)   Pulse 82   Temp 98.1  F (36.7  C) (Oral)   Resp 20   Ht 1.508 m (4' 11.37\")   Wt 64.9 kg (143 lb 1.3 oz)   SpO2 100%   BMI 28.54 kg/m     Recheck /76    Wt Readings from Last 3 Encounters:   09/17/19 64.9 kg (143 lb 1.3 oz)   09/18/18 63 kg (139 lb) (23 %)*   09/18/18 63.5 kg (139 lb 15.9 oz) (25 %)*     * Growth percentiles are based on CDC (Boys, 2-20 Years) data.     Ht Readings from Last 2 Encounters:   09/17/19 1.508 m (4' 11.37\")   09/18/18 1.499 m (4' 11\") (<1 %)*     * Growth percentiles are based on CDC (Boys, 2-20 Years) data.     Normalized BMI data available only for age 0 to 20 " years.      GENERAL:  David appears to be in no acute distress.  He is alert and oriented x3 and well-nourished.   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  Bilateral tympanic membranes are within normal limits.  Pupils are equally round and reactive to light and accommodation.  Extraocular movements are intact.  Fundoscopic exam negative for cataracts.  Oropharynx is clear without lesions.   NECK:  Supple.  Thyroid is nonpalpable.  There is a well-healed occipital surgical scar.   CARDIOVASCULAR:  Regular rate and rhythm with no murmurs, rubs or gallops.  Intact distal pulses.   RESPIRATORY:  Breath sounds are clear to auscultation bilaterally with normal effort and no distress.  No wheezes, crackles or rales were auscultated.   ABDOMEN:  Soft, nondistended, nontender, with bowel sounds in all 4 quadrants, no palpable masses or tenderness.   MUSCULOSKELETAL:  Normal range of motion. Muscular hypoplasia to paraspinous muscles - stable  LYMPHATIC:  Patient has no lymphadenopathy.   NEUROLOGIC:  David is alert and oriented x3, has 2-3+ lower bilateral reflexes.  He has no cranial nerve deficits and has normal muscle tone.  GCS score is 15. Romberg negative.  Gait WNL.    SKIN:  Skin is warm and dry with no rashes or erythema.   PSYCHIATRIC:  Mood and affect are within normal limits.      LABORATORY STUDIES:     IMAGING:  MR BRAIN W/O & W CONTRAST 9/17/2019 10:47 AM     Provided History: h/o medulloblastoma s/p chemo and radiation with  multiple radiation induced cavernomas.  Please re-evaluate;  Medulloblastoma of cerebellum (H); Status post chemotherapy; S/P  radiation therapy.  ICD-10: Medulloblastoma of cerebellum (H); Status post chemotherapy;  S/P radiation therapy     Comparison: MR brain 9/18/2018.     Technique: Multiplanar T1-weighted, axial FLAIR, and susceptibility  images were obtained without intravenous contrast. Following  intravenous gadolinium-based contrast  administration, axial  T2-weighted, diffusion, and T1-weighted images (in multiple planes)  were obtained.     Contrast: 6 mL Gadavist      Findings:  Redemonstration of postsurgical changes of medulloblastoma resection  of the fourth ventricle. Residual hemosiderin staining is unchanged.  There is unchanged hypertrophic right cerebellar vermis. No evidence  of contrast enhancement along the resection cavity. Mild T2  hyperintensity along the resection cavity consistent with postsurgical  changes. There is no mass effect, midline shift, or evidence of  intracranial hemorrhage. The ventricles are proportionate to the  cerebral sulci. Normal major vascular intracranial flow-voids.     Again there are multiple susceptibility weighted artifact within the  cerebellum which are grossly unchanged from prior imaging.     Mild T1 signal in the dentate nucleus predominantly on the left again  noted. Consistent with gadolinium accumulation.     No abnormality of the skull marrow signal. The visualized portions of  paranasal sinuses, and mastoid air cells are relatively clear. The  orbits are grossly unremarkable.                                                                      Impression:  1. Stable postsurgical changes of posterior fossa medulloblastoma  resection without evidence of tumor recurrence.  2. Stable appearance of multiple cerebellar postradiation cavernomas.     I have personally reviewed the examination and initial interpretation  and I agree with the findings.     SHERON JARVIS MD      ASSESSMENT AND PLAN:  David Beaulieu is a now 18 year-old  male with a history of medulloblastoma that completed treatment in 04/2009 with cis-retinoic acid.  He has been off therapy for 10 years.  He has some late effects in the area of endocrinopathies as well as neurocognitive likely due to his radiation therapy.  He also has hearing loss from his cisplatin treatment.   He continues to have stable radiation  related changes on his MRI.   The following are our long term recommendations:     1.  Risk of recurrence:  David is currently 10 years from the end of treatment for his medulloblastoma.  Given the distance from the diagnosis of his primary malignancy, the likelihood of recurrence continues to decline. Now that he is 10 years off therapy we can extend his MRI to 2 years from now.  Currently we are monitoring his radiation changes.      2.  Psychosocial effects:  David had previous neuropsych testing and was found to have problems with sustained attention, processing speed and some motor skills.  He has accommodations in college but has chosen to take a break from there and work.  If he notices any significant changes in cognition, we would recommend repeat testing.     3.  Risk for cardiac toxicity:  David has a history of spine radiation, which puts him at risk for developing cardiomyopathy and valvular dysfunction.  We recommend he have an echocardiogram every 5 years.  Baseline study in 2015 showed decreased LVEF to 47% in 4 chamber and 52% biplane; also small PFO with L to R shunting.  He saw peds cardiology in 2015 but family would like to follow up locally.  He had a local echocardiogram last year that was completely normal.  Repeat echo this year was also normal.  We will plan to repeat the echo in 2 years given that the last 2 have been stable.   There is also increasing evidence regarding metabolic syndrome in cancer survivors.  We would recommend that David have fasting lipids followed at least every 2-3 years.  Last done in 2015 but he wasn't fasting today.  He should try to have it completed with primary care.    4.  Hearing loss:  David has a history of sensorineural hearing loss likely related to his posterior fossa radiation and cisplatin treatment.  He has hearing aids now and should continue his exams locally.  He is due for an exam in the near future.    5.  Risk for peripheral neuropathy:   David has a history of vincristine exposure, which does put him at increased risk for peripheral motor neuropathy.  He does have some previous issues with fine motor skills, but these appear to be improved.     6.  Endocrinopathies:  David has a history of growth hormone deficiency and hypothyroidism, which are both likely from his radiation treatment.  He should have regular followup with Endocrinology as directed.     7.  Risk for secondary neoplasms/vascular changes:  David has a history of radiation, which does put him at increased risk for secondary neoplasms.  I counseled him that should he notice any changes in his cognitive status, any new lumps, bumps, rashes that they should be promptly evaluated given his history of radiation. MRI today was stable and continues to have radiation induced cavernomas.   He will have an MRI done in 24 months to evaluate post his whole brain radiation.  I did discuss with his father that we would stop doing spine examinations at this point and solely do brain MRI.   He had a new likely radiation induced blooming artifact that we will continue to follow.  I did also discuss the risk for stroke post radiation and reviewed symptoms.  Mom does have a history of CVA and dad is unsure if she has any underlying disorder that caused this.  CBC today is normal.    8.  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  He currently has no back pain or concerns.     9.  Risk for kidney toxicity:  David has a history of cisplatin and cyclophosphamide treatment, which can cause kidney and bladder dysfunction.  David's baseline CMP and UA were within normal limits.  We recommend that he have yearly assessments of his blood pressure and urinalysis.      10. Risk for cataracts:  David has a history of whole brain radiation so he has a risk for cataracts.  He should have regular eye exams  completed.  David will make an appt in the near future.  No cataracts noted on exam today.       It was a pleasure seeing David in our Cancer Survivorship Program.  We appreciate the opportunity to participate in your patient's care.  If you have any questions or concerns, please do not hesitate to contact us at [phone].   We ask that David return in 12 months for an exam/labs.  He will get his next audiogram locally.     We will try to coordinate with endocrine.    Next MRI and echo in 2 years.          Erica Knight MSN, APRN, CPNP-AC, CPON  Department of Pediatrics  Division of Hematology/Oncology

## 2019-09-17 NOTE — LETTER
9/17/2019      RE: David Beaulieu  [address]  [place] [address]              We had the pleasure of seeing your patient, David Beaulieu, at the [facility] Childhood Cancer Survivorship Program.  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  His tumor was located in his posterior fossa.  He was treated at the [facility] under the direction of Dr. Grant Rosenbaum.  David had initial surgical resection of his tumor which was as follows:   1.  Suboccipital craniotomy and near total tumor resection on 02/07/2008 with Dr. Wilber Valenzuela at the [facility].        David was treated as per the [facility] protocol [id number] and received the following chemotherapy:   1.  Vincristine intravenously.   2.  Cis-retinoic acid orally.   3.  Cisplatin intravenously with cumulative dose of 394 mg/meter squared.   4.  Cyclophosphamide intravenously with a cumulative dose of 12 grams/meter squared.      David also received radiation therapy as part of his treatment which is as follows:   1.  Whole brain radiation which started on 03/04/2008 and was completed on 03/31/2008 in 20 fractions, for a total dose of 3600 centigray.   2.  Spine radiation which started on 03/04/2008 was completed on 03/31/2008 in 20 fractions, for a total dose of 3600 centigray.   3.  Posterior fossa boost, which started on 04/01/2008 and was completed on 04/15/2008 in 11 fractions, for a total of 1972 centigray.   4.  Total dose to posterior fossa is 5572 centigray.      David's acute and chronic late effects known to date include:   1.  Severe bilateral sensorineural hearing loss diagnosed on 08/21/2008- wears hearing aids.   2.  Growth deceleration found on 09/22/2009.   3.  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   4.  Lower extremity weakness diagnosed on 11/03/2009, for which he started  "physical therapy.   5.  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   6.  Problems with executive functioning diagnosed on 10/25/2010.   7.  Slow processing speed diagnosed on 10/25/2010.   8.  Problems with fine motor skills diagnosed on 10/25/2010.   9.  Reduced spinal growth diagnosed on 07/24/2012.   10.  Problems with sustained attention diagnosed on 08/17/2012.   11.  Problems with emotional lability.   12.  Depression which was diagnosed on 12/03/2014.   13.  Left ventricular dysfunction diagnosed 9/2015       HISTORY OF PRESENT ILLNESS:  David reports to his long term follow up evaluation with his father.  He has been doing  well.  No current cardiovascular concerns.  He had an echo today that was normal.     He had local PFTs last year that were at the lower limits of normal.  He saw local pulmonary and there were no concerns.     He is seeing adult endocrinology, Dr. Caruso, yearly.  He has a visit today.   With regards to his attention, he is still taking Concerta.   He does have accommodations in school to get extra time for testing and preferred seating.  He did disclose that he is stopping going to college.  He says that he isn't \"book smart\" and \"school isn't for me.\"  He said that he was using his accommodations but he decided that he would rather work and he may not need a degree to do the job that he wants to do.   He last had neuropsych testing done in 12/2014.  David denies any headaches, vision changes.  He went for an eye doctor visit this year.  They said that his vision was mildly abnormal and he could use glasses but doesn't necessarily need them.  He doesn't need them for driving.      He denies any seizure-like activity or tremors.  No back pain, no fever.   With regards to his hearing, he received hearing aids 3 years ago.  He had his hearing tested last May 2017.  He is due for a hearing test locally.    Sleep and appetite good.  No skin changes.  He did have 2 cavities at " "the dentist last visit.  Those were filled.  He is due for another dental visit.     REVIEW OF SYSTEMS:  Comprehensive review of systems was negative except as started above.      CURRENT MEDICATIONS:   Current Outpatient Medications   Medication     Acetaminophen (TYLENOL CHILDRENS PO)     cetirizine (ZYRTEC) 10 MG tablet     ibuprofen (ADVIL,MOTRIN) 200 MG tablet     levothyroxine (SYNTHROID/LEVOTHROID) 125 MCG tablet     methylphenidate (CONCERTA) 18 MG CR tablet     pantoprazole (PROTONIX) 20 MG EC tablet     fluticasone (FLONASE) 50 MCG/ACT nasal spray     levothyroxine (SYNTHROID/LEVOTHROID) 125 MCG tablet     No current facility-administered medications for this visit.           ALLERGIES:  Amoxicillin.      FAMILY HISTORY:     Family History   Problem Relation Age of Onset     Cerebrovascular Disease Mother      Childhood Heart Surgery Father         PDA s/p repair     Hypertension Father    No change         SOCIAL HISTORY:  David completed his 2nd year of college at [employer] in [place].  He has accommodations for preferrential seating and extra time for tests.  He lives in [place].  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   Met with social work today for routine assessment.       PHYSICAL EXAMINATION:     VITAL SIGNS:   /88 (BP Location: Left arm, Patient Position: Chair, Cuff Size: Adult Regular)   Pulse 82   Temp 98.1  F (36.7  C) (Oral)   Resp 20   Ht 1.508 m (4' 11.37\")   Wt 64.9 kg (143 lb 1.3 oz)   SpO2 100%   BMI 28.54 kg/m      Recheck /76    Wt Readings from Last 3 Encounters:   09/17/19 64.9 kg (143 lb 1.3 oz)   09/18/18 63 kg (139 lb) (23 %)*   09/18/18 63.5 kg (139 lb 15.9 oz) (25 %)*     * Growth percentiles are based on CDC (Boys, 2-20 Years) data.     Ht Readings from Last 2 Encounters:   09/17/19 1.508 m (4' 11.37\")   09/18/18 1.499 m (4' 11\") (<1 %)*     * Growth percentiles are based on CDC " (Boys, 2-20 Years) data.     Normalized BMI data available only for age 0 to 20 years.      GENERAL:  David appears to be in no acute distress.  He is alert and oriented x3 and well-nourished.   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  Bilateral tympanic membranes are within normal limits.  Pupils are equally round and reactive to light and accommodation.  Extraocular movements are intact.  Fundoscopic exam negative for cataracts.  Oropharynx is clear without lesions.   NECK:  Supple.  Thyroid is nonpalpable.  There is a well-healed occipital surgical scar.   CARDIOVASCULAR:  Regular rate and rhythm with no murmurs, rubs or gallops.  Intact distal pulses.   RESPIRATORY:  Breath sounds are clear to auscultation bilaterally with normal effort and no distress.  No wheezes, crackles or rales were auscultated.   ABDOMEN:  Soft, nondistended, nontender, with bowel sounds in all 4 quadrants, no palpable masses or tenderness.   MUSCULOSKELETAL:  Normal range of motion. Muscular hypoplasia to paraspinous muscles - stable  LYMPHATIC:  Patient has no lymphadenopathy.   NEUROLOGIC:  David is alert and oriented x3, has 2-3+ lower bilateral reflexes.  He has no cranial nerve deficits and has normal muscle tone.  GCS score is 15. Romberg negative.  Gait WNL.    SKIN:  Skin is warm and dry with no rashes or erythema.   PSYCHIATRIC:  Mood and affect are within normal limits.      LABORATORY STUDIES:     IMAGING:  MR BRAIN W/O & W CONTRAST 9/17/2019 10:47 AM     Provided History: h/o medulloblastoma s/p chemo and radiation with  multiple radiation induced cavernomas.  Please re-evaluate;  Medulloblastoma of cerebellum (H); Status post chemotherapy; S/P  radiation therapy.  ICD-10: Medulloblastoma of cerebellum (H); Status post chemotherapy;  S/P radiation therapy     Comparison: MR brain 9/18/2018.     Technique: Multiplanar T1-weighted, axial FLAIR, and susceptibility  images were obtained  without intravenous contrast. Following  intravenous gadolinium-based contrast administration, axial  T2-weighted, diffusion, and T1-weighted images (in multiple planes)  were obtained.     Contrast: 6 mL Gadavist      Findings:  Redemonstration of postsurgical changes of medulloblastoma resection  of the fourth ventricle. Residual hemosiderin staining is unchanged.  There is unchanged hypertrophic right cerebellar vermis. No evidence  of contrast enhancement along the resection cavity. Mild T2  hyperintensity along the resection cavity consistent with postsurgical  changes. There is no mass effect, midline shift, or evidence of  intracranial hemorrhage. The ventricles are proportionate to the  cerebral sulci. Normal major vascular intracranial flow-voids.     Again there are multiple susceptibility weighted artifact within the  cerebellum which are grossly unchanged from prior imaging.     Mild T1 signal in the dentate nucleus predominantly on the left again  noted. Consistent with gadolinium accumulation.     No abnormality of the skull marrow signal. The visualized portions of  paranasal sinuses, and mastoid air cells are relatively clear. The  orbits are grossly unremarkable.                                                                      Impression:  1. Stable postsurgical changes of posterior fossa medulloblastoma  resection without evidence of tumor recurrence.  2. Stable appearance of multiple cerebellar postradiation cavernomas.     I have personally reviewed the examination and initial interpretation  and I agree with the findings.     SHERON JARVIS MD      ASSESSMENT AND PLAN:  David Beaulieu is a now 18 year-old  male with a history of medulloblastoma that completed treatment in 04/2009 with cis-retinoic acid.  He has been off therapy for 10 years.  He has some late effects in the area of endocrinopathies as well as neurocognitive likely due to his radiation therapy.  He also has hearing  loss from his cisplatin treatment.   He continues to have stable radiation related changes on his MRI.   The following are our long term recommendations:     1.  Risk of recurrence:  David is currently 10 years from the end of treatment for his medulloblastoma.  Given the distance from the diagnosis of his primary malignancy, the likelihood of recurrence continues to decline. Now that he is 10 years off therapy we can extend his MRI to 2 years from now.  Currently we are monitoring his radiation changes.      2.  Psychosocial effects:  David had previous neuropsych testing and was found to have problems with sustained attention, processing speed and some motor skills.  He has accommodations in college but has chosen to take a break from there and work.  If he notices any significant changes in cognition, we would recommend repeat testing.     3.  Risk for cardiac toxicity:  David has a history of spine radiation, which puts him at risk for developing cardiomyopathy and valvular dysfunction.  We recommend he have an echocardiogram every 5 years.  Baseline study in 2015 showed decreased LVEF to 47% in 4 chamber and 52% biplane; also small PFO with L to R shunting.  He saw [facility] cardiology in 2015 but family would like to follow up locally.  He had a local echocardiogram last year that was completely normal.  Repeat echo this year was also normal.  We will plan to repeat the echo in 2 years given that the last 2 have been stable.   There is also increasing evidence regarding metabolic syndrome in cancer survivors.  We would recommend that David have fasting lipids followed at least every 2-3 years.  Last done in 2015 but he wasn't fasting today.  He should try to have it completed with primary care.    4.  Hearing loss:  David has a history of sensorineural hearing loss likely related to his posterior fossa radiation and cisplatin treatment.  He has hearing aids now and should continue his exams locally.  He is due  for an exam in the near future.    5.  Risk for peripheral neuropathy:  David has a history of vincristine exposure, which does put him at increased risk for peripheral motor neuropathy.  He does have some previous issues with fine motor skills, but these appear to be improved.     6.  Endocrinopathies:  David has a history of growth hormone deficiency and hypothyroidism, which are both likely from his radiation treatment.  He should have regular followup with Endocrinology as directed.     7.  Risk for secondary neoplasms/vascular changes:  David has a history of radiation, which does put him at increased risk for secondary neoplasms.  I counseled him that should he notice any changes in his cognitive status, any new lumps, bumps, rashes that they should be promptly evaluated given his history of radiation. MRI today was stable and continues to have radiation induced cavernomas.   He will have an MRI done in 24 months to evaluate post his whole brain radiation.  I did discuss with his father that we would stop doing spine examinations at this point and solely do brain MRI.   He had a new likely radiation induced blooming artifact that we will continue to follow.  I did also discuss the risk for stroke post radiation and reviewed symptoms.  Mom does have a history of CVA and dad is unsure if she has any underlying disorder that caused this.  CBC today is normal.    8.  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  He currently has no back pain or concerns.     9.  Risk for kidney toxicity:  David has a history of cisplatin and cyclophosphamide treatment, which can cause kidney and bladder dysfunction.  David's baseline CMP and UA were within normal limits.  We recommend that he have yearly assessments of his blood pressure and urinalysis.      10. Risk for cataracts:  David has a history of whole brain  radiation so he has a risk for cataracts.  He should have regular eye exams completed.  David will make an appt in the near future.  No cataracts noted on exam today.       It was a pleasure seeing David in our Cancer Survivorship Program.  We appreciate the opportunity to participate in your patient's care.  If you have any questions or concerns, please do not hesitate to contact us at [phone].   We ask that David return in 12 months for an exam/labs.  He will get his next audiogram locally.     We will try to coordinate with endocrine.    Next MRI and echo in 2 years.          Erica Knight MSN, APRN, CPNP-AC, CPON  Department of Pediatrics  Division of Hematology/Oncology

## 2019-09-17 NOTE — LETTER
9/17/2019       RE: David Beaulieu  [address]  [place] [address]     Dear Colleague,    Thank you for referring your patient, David Beaulieu, to the [facility] ENDOCRINOLOGY at [facility]. Please see a copy of my visit note below.    magdalena                                                                           - Endocrinology Follow up -    Reason for visit/consult: Gross hormone deficiency,  secondary hypothyroidism.    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  Denied low energy or muscle weakness. Also no significant gonadal atrophy.   But he has low bone dentisy in Z score, I think very small dose of tesotsterone will benefit his quality of life.     - total testosterone today    - Testosterone gel 1 pump daily to start this year.     # Low bone density  Last bone density in 2017, Z score spine -2.2.    - repeat bone density this year.    - calcium citrate 2 tabs (elemental Ca 630 mg, vitamin D 500IU)       #Growth hormone deficiency  Bone age match with his actual age in 2015, since then discontinued growth hormone injection and patient has been doing well.  - IGF1 pending today      #Secondary hypothyroidism  TSH slight elevated, subclinical hypothryoidism (TSH 4.59) but clinically he is doing great. I talked about medication compliance and this year no change dose of levothyroxine.     -Continue current levothyroxine 125 mcg    # Other pituitary axis  Am cortisol pending, no clinical signs of adrenal insufficiency.     - RTC with me in 1 year    I spent 30 minutes with this patient face to face and explained the conditions and plans (more than 50% of time was counseling/coordination of care, mamagement of pituitary insufficiency, discussed  blood work plan) . The patient understood and is satisfied with today's visit. Return to clinic with me in 1 year.         Karen Caruso MD  Staff Physician  Endocrinology and Metabolism  License: [id number]    Interval History as of 9/17/2019 : Patient has been doing well. Last seen 1 year ago. Medication compliance good. New event includes: non significant . Eating wel, stable body weight, good actvity level.   HPI: A 17-year-old male here for transition care from pediatric endocrinologist for his long history of growth hormone deficiency, secondary hypogonadism, secondary hypothyroidism.  Patient came with his father. Last seen by Dr. Higgins in 9/2015.   Patient was diagnosed medulloblastoma at age 7 with a symptom of a headache and vomiting, he underwent brain surgery followed by chemotherapy and radiation therapy.  Patient developed gross hormone deficiency which was confirmed by biochemical test, was on growth hormone injection since end of 3/2010. Growth hormone stimulation testing 03/17/2010 showed a peak response following clonidine of 3 and arginine of 3.4 consistent with severe growth hormone deficiency. He was started on growth hormone therapy at the end of 03/2010. GH was discontinued in 4/2015. He did not have symptoms such as low energy, myalgia, arthralgia after discontinuing growth hormone injection.  He also has secondary hypothyroidism which he is currently on levothyroxine 125 mcg with good compliance.     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   Energy level: ok    Past Medical/Surgical History:  Past Medical History:   Diagnosis Date     Cancer (H)      No past surgical history on file.    Allergies:  Allergies   Allergen Reactions     Amoxicillin Hives     SUSR       Current Medications   Current Outpatient Medications   Medication     Acetaminophen (TYLENOL CHILDRENS PO)     cetirizine (ZYRTEC) 10 MG tablet     fluticasone  "(FLONASE) 50 MCG/ACT nasal spray     ibuprofen (ADVIL,MOTRIN) 200 MG tablet     levothyroxine (SYNTHROID/LEVOTHROID) 125 MCG tablet     levothyroxine (SYNTHROID/LEVOTHROID) 125 MCG tablet     methylphenidate (CONCERTA) 18 MG CR tablet     pantoprazole (PROTONIX) 20 MG EC tablet     No current facility-administered medications for this visit.        Family History:  Family History   Problem Relation Age of Onset     Cerebrovascular Disease Mother      Childhood Heart Surgery Father         PDA s/p repair     Hypertension Father    maternal aunt: ovarian cancer, maternal grandmother: lung cancer, maternal grandfather: prostate cancer    Social History:  Social History     Tobacco Use     Smoking status: Never Smoker     Smokeless tobacco: Never Used   Substance Use Topics     Alcohol use: Not on file   lives college campus, studying to become .     ROS:  Full review of systems taken with the help of the intake sheet. Otherwise a complete 14 point review of systems was taken and is negative unless stated in the history above.      Physical Exam:   Blood pressure 118/76, pulse 82, height 1.499 m (4' 11\"), weight 64.9 kg (143 lb).    General: well appearing, no acute distress, pleasant and conversant,   Mental Status/neuro: alert and oriented  Skull: microcephalia  Face: symmetrical, normal facial color  Eyes: anicteric, PERRL, no proptosis or lid lag  Neck: suppler, no lymphadenopahty  Thyroid: normal size and texture, no nodule palpable, no bruits  Chest : No hair growth , no gynecomastia   heart: regular rhythm, S1S2, no murmur appreciated  Lung: clear to auscultation bilaterally  Abdomen: soft, NT/ND, no hepatomegaly  Testicular exam: 15-20 mL each, symmetrical.  Legs: no swelling or edema        Labs : I reviewed data from epic and extract and summarize the pertinent data here.      Ref. Range 9/30/2015 12:20 9/20/2016 11:52 9/19/2017 12:09 9/18/2018 15:46 9/17/2019 13:45   Testosterone Total Latest Ref " Range: 300 - 1,200 ng/dL 308 295 (L) 349     TSH Latest Ref Range: 0.40 - 4.00 mU/L <0.01 (L) 0.02 (L) 0.06 (L) 5.38 (H) 4.59 (H)   T4 Free Latest Ref Range: 0.76 - 1.46 ng/dL 1.60 (H) 1.46 1.37 1.04 1.14   Ins Growth Factor 1 Latest Ref Range: 137 - 428 ng/ml    232    Lutropin Latest Ref Range: 1.5 - 9.3 IU/L 3.5 2.6 5.9     FSH Latest Ref Range: 0.7 - 10.8 IU/L 9.3 7.9 7.8         MRI Brain: I personally reviewed the original images and agree with the below reports.   Results for orders placed during the hospital encounter of 09/19/17   MRI Brain w & w/o contrast with susceptibility weighted imaging    Narrative  MR BRAIN W/O & W CONTRAST 9/19/2017 11:36 AM    History: Medulloblastoma diagnosed on 02/07/2008 at 7 years of age.  ?Initial surgical resection of his tumor with suboccipital craniotomy  and near total tumor resection on 02/07/2008.  ??  Patient was treated with subsequent chemo therapy and radiation  therapy.    ??  Comparison: Head MRI 9/20/2016, 9/30/2015 and 2/8/2008    Technique: Multiplanar T1-weighted, axial FLAIR, and susceptibility  images were obtained without intravenous contrast. Following  intravenous gadolinium-based contrast administration, axial  T2-weighted, diffusion, and T1-weighted images (in multiple planes)  were obtained.    Contrast dose: 6ml iv gadavist    Findings: Postsurgical changes of suboccipital craniotomy and  resection of fourth ventricle medulloblastoma. Hemosiderin staining  within the surgical cavity. Unchanged appearance of the resection  cavity with accompanying atrophic changes of the cerebellar vermis and  right cerebellar hemisphere likely secondary to a combination of  radiotherapy and surgery. On postcontrast series there is no enhancing  lesion to suggest residual lesion or recurrence. No abnormal  restricted diffusion within the surgical site to suggest tumor  recurrence. On susceptibility weighted imaging, there are stable  scattered punctate foci of  susceptibility artifact throughout the  subcortical and periventricular white matter. These may represent  chronic microhemorrhages versus post radiation cavernomas.    Compared to previous MRI exams, there is no intracranial hemorrhage  mass effect or new enhancing lesion. Ventricular system appears stable  in size. Patent major flow voids. Venous sinuses appear normal.  Orbital structures are grossly unremarkable.    Again noted subtle increased T1 signal on noncontrast T1-weighted  images within the left dentate nucleus which may represent  accumulation of gadolinium over the years.      Impression Impression:  1. Postsurgical changes of posterior fossa medulloblastoma resection  without any evidence of tumor recurrence.    2. Unchanged scattered foci of susceptibility artifact consistent with  either chronic microhemorrhages versus postradiation cavernomas.    3. Unchanged T1 signal in the basal ganglia and left dentate nucleus  which may represent accumulated gadolinium.    I have personally reviewed the examination and initial interpretation  and I agree with the findings.    FREDERICK SOTO MD     Again, thank you for allowing me to participate in the care of your patient.      Sincerely,    Karen Caruso MD

## 2019-09-19 ENCOUNTER — TELEPHONE (OUTPATIENT)
Dept: ENDOCRINOLOGY | Facility: CLINIC | Age: 19
End: 2019-09-19

## 2019-09-19 LAB — TESTOST SERPL-MCNC: 191 NG/DL (ref 240–950)

## 2019-09-19 NOTE — TELEPHONE ENCOUNTER
Prior Authorization Retail Medication Request    Medication/Dose: Testosterone 1.62% . Place 1 Pump onto the skin daily     ICD code (if different than what is on RX):  E29.1, E23.0    Rationale:  Hypogonadism male , Panhypopituitarism     Insurance Name:  Medicaid   Insurance ID:  [id number]      Pharmacy Information   Name:  [facility]   Phone:  [phone]

## 2019-09-19 NOTE — TELEPHONE ENCOUNTER
PRIOR AUTHORIZATION DENIED    Medication: Testosterone 1.62 % GEL - P/A DENIED    Denial Date: 9/19/2019    Denial Rational: Aside from the low testosterone levels, patient has to have prior history of at least Androgel 1% or 1.6% without adequate testosterone response, measure by labwork. Note- Androgel will also need a Prior Auth, but it is the first step.      Appeal Information:

## 2019-09-19 NOTE — TELEPHONE ENCOUNTER
Central Prior Authorization Team   Phone: [phone]    PA Initiation    Medication: Testosterone 1.62 % GEL  Insurance Company: [place] Medicaid ([facility]) - Phone [phone] Fax [phone]  Pharmacy Filling the Rx: [id number] - [place], [place] - [address]  Filling Pharmacy Phone: [phone]  Filling Pharmacy Fax:    Start Date: 9/19/2019

## 2019-09-20 DIAGNOSIS — E29.1 MALE HYPOGONADISM: Primary | ICD-10-CM

## 2019-09-20 LAB — IGF-I BLD-MCNC: 147 NG/ML (ref 133–395)

## 2019-09-20 NOTE — TELEPHONE ENCOUNTER
Karen Caruso MD Mathis, Jacqueline M RN   Cc: Lorena Higgins RN   Caller: Unspecified (Yesterday,  1:23 PM)             I am in [facility] today and Annie is contacting patient and sending lab slip to his house (distant patient).

## 2019-09-20 NOTE — TELEPHONE ENCOUNTER
Per Dr. Caruso:    I am in [facility] today and Annie is contacting patient and sending lab slip to his house (distant patient).     Thank you both!!     Karen             Patient advised. Patient verbalizes understanding and agrees to plan. Orders mailed to patient.       Annie Olivia, RN  Endocrine Care Coordinator  [facility]

## 2019-09-20 NOTE — TELEPHONE ENCOUNTER
Testosterone 1.62 % GEL 75 g 3 9/17/2019  --   Sig - Route: Place 1 Pump onto the skin daily       Clarification sought on Appeal or alternate order?  Approval requires 2, low AM testosterone results w/i last 18 months;   Also; history of androgel 1% or 1.6% as therapy without adequate response in lab values.     Lorena Higgins RN on 9/20/2019 at 8:52 AM

## 2019-09-20 NOTE — TELEPHONE ENCOUNTER
It appears patient was denied because he doesn't have to AM testosterone labs within the last 6 months.

## 2019-10-31 ENCOUNTER — DOCUMENTATION ONLY (OUTPATIENT)
Dept: ENDOCRINOLOGY | Facility: CLINIC | Age: 19
End: 2019-10-31

## 2019-10-31 NOTE — PROGRESS NOTES
Total testosterone is 265  10/30/2019 in Care Everywhere. Continue current 1 pump olivier Caruso MD  Staff Physician  Endocrinology and Metabolism  [facility]  License: [id number]  Pager: [phone]

## 2019-11-01 NOTE — TELEPHONE ENCOUNTER
----- Message from Karen Caruso MD sent at 10/31/2019 11:17 AM CDT -----  Getting better, continue current dose  ----- Message -----  From: Alisson De La Paz MA  Sent: 10/31/2019  11:14 AM CDT  To: Karen Caruso MD    Total testosterone is 265 I am able to view in Care Everywhere.      Alisson  ----- Message -----  From: Karen Caruso MD  Sent: 10/31/2019  10:57 AM CDT  To: Alisson De La Paz MA    I dont see total testosterone, I thought I ordered. Could you double check?  I saw in careeverywhere for holding purple tube, are they waiting for our testosterone order??  ----- Message -----  From: Alisson De La Paz MA  Sent: 10/31/2019  10:30 AM CDT  To: Karen Caruso MD    New labs in Care Everywhere under [facility]

## 2019-11-08 ENCOUNTER — HEALTH MAINTENANCE LETTER (OUTPATIENT)
Age: 19
End: 2019-11-08

## 2019-12-23 DIAGNOSIS — E23.0 PANHYPOPITUITARISM (H): ICD-10-CM

## 2019-12-23 DIAGNOSIS — E03.8 TSH DEFICIENCY: ICD-10-CM

## 2019-12-26 NOTE — TELEPHONE ENCOUNTER
levothyroxine 125 MCG    Last Written Prescription Date:  12/10/18  Last Fill Quantity: 90 ,   # refills: 3  Last Office Visit : 9/17/19  Future Office visit:  NONE    Routing refill request to provider for review/approval because:  90 DAY RF PENDING    ABNORM  TSH

## 2019-12-27 RX ORDER — LEVOTHYROXINE SODIUM 125 UG/1
125 TABLET ORAL DAILY
Qty: 90 TABLET | Refills: 0 | Status: SHIPPED | OUTPATIENT
Start: 2019-12-27 | End: 2020-02-29

## 2020-02-27 DIAGNOSIS — E03.8 TSH DEFICIENCY: ICD-10-CM

## 2020-02-27 DIAGNOSIS — E23.0 PANHYPOPITUITARISM (H): ICD-10-CM

## 2020-02-29 RX ORDER — LEVOTHYROXINE SODIUM 125 UG/1
125 TABLET ORAL DAILY
Qty: 34 TABLET | Refills: 0 | Status: SHIPPED | OUTPATIENT
Start: 2020-02-29 | End: 2020-04-05

## 2020-02-29 NOTE — TELEPHONE ENCOUNTER
levothyroxine (SYNTHROID/LEVOTHROID) 125 MCG tablet  Last Written Prescription Date:  12/27/19  Last Fill Quantity: 90,   # refills: 0  Last Office Visit : 9/17/19  Future Office visit: none    Routing refill request to provider for review/approval because: FYI outside lab   10/30/19  Thyroid Stimulating Hormone 0.40 - 3.99 uIU/mL 2.23

## 2020-04-02 DIAGNOSIS — E03.8 TSH DEFICIENCY: ICD-10-CM

## 2020-04-02 DIAGNOSIS — E23.0 PANHYPOPITUITARISM (H): ICD-10-CM

## 2020-04-05 RX ORDER — LEVOTHYROXINE SODIUM 125 UG/1
125 TABLET ORAL DAILY
Qty: 90 TABLET | Refills: 2 | Status: SHIPPED | OUTPATIENT
Start: 2020-04-05 | End: 2021-09-21 | Stop reason: DRUGHIGH

## 2020-04-05 NOTE — TELEPHONE ENCOUNTER
Last Clinic Visit: 9-17-19  Outside TSH:  Ref Range & Units  Value     Thyroid Stimulating Hormone  0.40 - 3.99 uIU/mL  2.23

## 2020-09-10 NOTE — PROGRESS NOTES
"David Beaulieu is a 19 year old male who is being evaluated via a billable video visit.      The patient has been notified of following:     \"This video visit will be conducted via a call between you and your physician/provider. We have found that certain health care needs can be provided without the need for an in-person physical exam.  This service lets us provide the care you need with a video conversation.  If a prescription is necessary we can send it directly to your pharmacy.  If lab work is needed we can place an order for that and you can then stop by our lab to have the test done at a later time.    Video visits are billed at different rates depending on your insurance coverage.  Please reach out to your insurance provider with any questions.    If during the course of the call the physician/provider feels a video visit is not appropriate, you will not be charged for this service.\"    Patient has given verbal consent for Video visit? Yes  How would you like to obtain your AVS? MyChart  If you are dropped from the video visit, the video invite should be resent to: Text to cell phone: cell  Will anyone else be joining your video visit? No        Video-Visit Details    Type of service:  Video Visit    Video Start Time: 2:40 pm  End 3:03 pm    Originating Location (pt. Location): Home    Distant Location (provider location):  [facility] ENDOCRINOLOGY     Platform used for Video Visit: Doximity                                                                         - Endocrinology Follow up -    Reason for visit/consult: Gross hormone deficiency,  secondary hypothyroidism.    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  Denied low energy or muscle weakness. Also no significant gonadal " atrophy. But he has significant thin hair. He totally forgot about gel. he has low bone dentisy in Z score    - Switch from gel to testosterone injection 100 mg once a week     - total testosterone next week    - next testosterone level between mikki and ozzy    # Low bone density  Last bone density in 2017, Z score spine -2.2.    - repeat bone density prior to next visit    - calcium citrate 2 tabs (elemental Ca 630 mg, vitamin D 500IU)       #Growth hormone deficiency  Bone age match with his actual age in 2015, since then discontinued growth hormone injection and patient has been doing well.  - IGF1 2019 normal 147,    - no need GH therapy      #Secondary hypothyroidism  TSH slight elevated, subclinical hypothryoidism (TSH 4.59) but clinically he is doing great. I talked about medication compliance and this year no change dose of levothyroxine.     -Continue current levothyroxine 125 mcg    # Other pituitary axis  No adrenal insufficiency    - RTC with me in 1 year      Karen Caruso MD  Staff Physician  Endocrinology and Metabolism  License: [id number]    Interval History as of 9/14/2020 : Patient has been doing well. Last seen 1 year ago. Medication compliance: totally forgot about testosterone gel   . New event includes: able to work but sometimes tired.   .  Interval History as of 9/17/2019 : Patient has been doing well. Last seen 1 year ago. Medication compliance good. New event includes: non significant . Eating wel, stable body weight, good actvity level.   HPI: A 17-year-old male here for transition care from pediatric endocrinologist for his long history of growth hormone deficiency, secondary hypogonadism, secondary hypothyroidism.  Patient came with his father. Last seen by Dr. Higgins in 9/2015.   Patient was diagnosed medulloblastoma at age 7 with a symptom of a headache and vomiting, he underwent brain surgery followed by chemotherapy and radiation therapy.  Patient developed gross hormone  deficiency which was confirmed by biochemical test, was on growth hormone injection since end of 3/2010. Growth hormone stimulation testing 03/17/2010 showed a peak response following clonidine of 3 and arginine of 3.4 consistent with severe growth hormone deficiency. He was started on growth hormone therapy at the end of 03/2010. GH was discontinued in 4/2015. He did not have symptoms such as low energy, myalgia, arthralgia after discontinuing growth hormone injection.  He also has secondary hypothyroidism which he is currently on levothyroxine 125 mcg with good compliance.     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   Energy level: ok    Past Medical/Surgical History:  Past Medical History:   Diagnosis Date     Cancer (H)      No past surgical history on file.    Allergies:  Allergies   Allergen Reactions     Amoxicillin Hives     SUSR       Current Medications   Current Outpatient Medications   Medication     Acetaminophen (TYLENOL CHILDRENS PO)     calcium citrate-vitamin D (CALCIUM CITRATE + D) 315-250 MG-UNIT TABS per tablet     cetirizine (ZYRTEC) 10 MG tablet     fluticasone (FLONASE) 50 MCG/ACT nasal spray     ibuprofen (ADVIL,MOTRIN) 200 MG tablet     levothyroxine (SYNTHROID/LEVOTHROID) 125 MCG tablet     levothyroxine (SYNTHROID/LEVOTHROID) 125 MCG tablet     methylphenidate (CONCERTA) 18 MG CR tablet     pantoprazole (PROTONIX) 20 MG EC tablet     Testosterone 1.62 % GEL     No current facility-administered medications for this visit.        Family History:  Family History   Problem Relation Age of Onset     Cerebrovascular Disease Mother      Childhood Heart Surgery Father         PDA s/p repair     Hypertension Father    maternal aunt: ovarian cancer, maternal grandmother: lung cancer, maternal grandfather: prostate cancer    Social History:  Social History     Tobacco Use     Smoking status: Never Smoker     Smokeless tobacco: Never  Used   Substance Use Topics     Alcohol use: Not on file   lives college campus, studying to become .     ROS:  Full review of systems taken with the help of the intake sheet. Otherwise a complete 14 point review of systems was taken and is negative unless stated in the history above.      Physical Exam:   There were no vitals taken for this visit.    General: well appearing, no acute distress, pleasant and conversant,   Mental Status/neuro: alert and oriented  Skull: microcephalia  Face: symmetrical, normal facial color  Eyes: anicteric, PERRL, no proptosis or lid lag  Neck: suppler, no lymphadenopahty  Thyroid: normal size and texture, no nodule palpable, no bruits  Chest : No hair growth , no gynecomastia   heart: regular rhythm, S1S2, no murmur appreciated  Lung: clear to auscultation bilaterally  Abdomen: soft, NT/ND, no hepatomegaly  Testicular exam: 15-20 mL each, symmetrical.  Legs: no swelling or edema        Labs : I reviewed data from epic and extract and summarize the pertinent data here.      Ref. Range 9/30/2015 12:20 9/20/2016 11:52 9/19/2017 12:09 9/18/2018 15:46 9/17/2019 13:45   Testosterone Total Latest Ref Range: 300 - 1,200 ng/dL 308 295 (L) 349     TSH Latest Ref Range: 0.40 - 4.00 mU/L <0.01 (L) 0.02 (L) 0.06 (L) 5.38 (H) 4.59 (H)   T4 Free Latest Ref Range: 0.76 - 1.46 ng/dL 1.60 (H) 1.46 1.37 1.04 1.14   Ins Growth Factor 1 Latest Ref Range: 137 - 428 ng/ml    232    Lutropin Latest Ref Range: 1.5 - 9.3 IU/L 3.5 2.6 5.9     FSH Latest Ref Range: 0.7 - 10.8 IU/L 9.3 7.9 7.8         MRI Brain: I personally reviewed the original images and agree with the below reports.   Results for orders placed during the hospital encounter of 09/19/17   MRI Brain w & w/o contrast with susceptibility weighted imaging    Narrative  MR BRAIN W/O & W CONTRAST 9/19/2017 11:36 AM    History: Medulloblastoma diagnosed on 02/07/2008 at 7 years of age.  ?Initial surgical resection of his tumor with  suboccipital craniotomy  and near total tumor resection on 02/07/2008.  ??  Patient was treated with subsequent chemo therapy and radiation  therapy.    ??  Comparison: Head MRI 9/20/2016, 9/30/2015 and 2/8/2008    Technique: Multiplanar T1-weighted, axial FLAIR, and susceptibility  images were obtained without intravenous contrast. Following  intravenous gadolinium-based contrast administration, axial  T2-weighted, diffusion, and T1-weighted images (in multiple planes)  were obtained.    Contrast dose: 6ml iv gadavist    Findings: Postsurgical changes of suboccipital craniotomy and  resection of fourth ventricle medulloblastoma. Hemosiderin staining  within the surgical cavity. Unchanged appearance of the resection  cavity with accompanying atrophic changes of the cerebellar vermis and  right cerebellar hemisphere likely secondary to a combination of  radiotherapy and surgery. On postcontrast series there is no enhancing  lesion to suggest residual lesion or recurrence. No abnormal  restricted diffusion within the surgical site to suggest tumor  recurrence. On susceptibility weighted imaging, there are stable  scattered punctate foci of susceptibility artifact throughout the  subcortical and periventricular white matter. These may represent  chronic microhemorrhages versus post radiation cavernomas.    Compared to previous MRI exams, there is no intracranial hemorrhage  mass effect or new enhancing lesion. Ventricular system appears stable  in size. Patent major flow voids. Venous sinuses appear normal.  Orbital structures are grossly unremarkable.    Again noted subtle increased T1 signal on noncontrast T1-weighted  images within the left dentate nucleus which may represent  accumulation of gadolinium over the years.      Impression Impression:  1. Postsurgical changes of posterior fossa medulloblastoma resection  without any evidence of tumor recurrence.    2. Unchanged scattered foci of susceptibility artifact  consistent with  either chronic microhemorrhages versus postradiation cavernomas.    3. Unchanged T1 signal in the basal ganglia and left dentate nucleus  which may represent accumulated gadolinium.    I have personally reviewed the examination and initial interpretation  and I agree with the findings.    FREDERICK SOTO MD

## 2020-09-14 ENCOUNTER — VIRTUAL VISIT (OUTPATIENT)
Dept: ENDOCRINOLOGY | Facility: CLINIC | Age: 20
End: 2020-09-14
Payer: COMMERCIAL

## 2020-09-14 DIAGNOSIS — E23.0 GROWTH HORMONE DEFICIENCY (H): ICD-10-CM

## 2020-09-14 DIAGNOSIS — E03.8 SECONDARY HYPOTHYROIDISM: ICD-10-CM

## 2020-09-14 DIAGNOSIS — E29.1 SECONDARY MALE HYPOGONADISM: Primary | ICD-10-CM

## 2020-09-14 RX ORDER — TESTOSTERONE CYPIONATE 200 MG/ML
100 INJECTION, SOLUTION INTRAMUSCULAR WEEKLY
Qty: 10 ML | Refills: 5 | Status: SHIPPED | OUTPATIENT
Start: 2020-09-14 | End: 2021-04-16

## 2020-09-14 NOTE — LETTER
"9/14/2020       RE: David Beaulieu  [address]  [facility] [address]     Dear Colleague,    Thank you for referring your patient, David Beaulieu, to the [facility] ENDOCRINOLOGY at [facility]. Please see a copy of my visit note below.    David Beaulieu is a 19 year old male who is being evaluated via a billable video visit.      The patient has been notified of following:     \"This video visit will be conducted via a call between you and your physician/provider. We have found that certain health care needs can be provided without the need for an in-person physical exam.  This service lets us provide the care you need with a video conversation.  If a prescription is necessary we can send it directly to your pharmacy.  If lab work is needed we can place an order for that and you can then stop by our lab to have the test done at a later time.    Video visits are billed at different rates depending on your insurance coverage.  Please reach out to your insurance provider with any questions.    If during the course of the call the physician/provider feels a video visit is not appropriate, you will not be charged for this service.\"    Patient has given verbal consent for Video visit? Yes  How would you like to obtain your AVS? MyChart  If you are dropped from the video visit, the video invite should be resent to: Text to cell phone: cell  Will anyone else be joining your video visit? No        Video-Visit Details    Type of service:  Video Visit    Video Start Time: 2:40 pm  End 3:03 pm    Originating Location (pt. Location): Home    Distant Location (provider location):  [facility] ENDOCRINOLOGY     Platform used for Video Visit: Doximity                                                                         - Endocrinology Follow up -    Reason for visit/consult: Gross hormone deficiency,  secondary hypothyroidism.    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  Denied low energy or muscle weakness. Also no significant gonadal atrophy. But he has significant thin hair. He totally forgot about gel. he has low bone dentisy in Z score    - Switch from gel to testosterone injection 100 mg once a week     - total testosterone next week    - next testosterone level between thanksgiving and ozzy    # Low bone density  Last bone density in 2017, Z score spine -2.2.    - repeat bone density prior to next visit    - calcium citrate 2 tabs (elemental Ca 630 mg, vitamin D 500IU)       #Growth hormone deficiency  Bone age match with his actual age in 2015, since then discontinued growth hormone injection and patient has been doing well.  - IGF1 2019 normal 147,    - no need GH therapy      #Secondary hypothyroidism  TSH slight elevated, subclinical hypothryoidism (TSH 4.59) but clinically he is doing great. I talked about medication compliance and this year no change dose of levothyroxine.     -Continue current levothyroxine 125 mcg    # Other pituitary axis  No adrenal insufficiency    - RTC with me in 1 year      Karen Caruso MD  Staff Physician  Endocrinology and Metabolism  License: [id number]    Interval History as of 9/14/2020 : Patient has been doing well. Last seen 1 year ago. Medication compliance: totally forgot about testosterone gel   . New event includes: able to work but sometimes tired.   .  Interval History as of 9/17/2019 : Patient has been doing well. Last seen 1 year ago. Medication compliance good. New event includes: non significant . Eating wel, stable body weight, good actvity level.   HPI: A 17-year-old male here for transition care from pediatric endocrinologist for his long history of growth hormone deficiency, secondary hypogonadism,  secondary hypothyroidism.  Patient came with his father. Last seen by Dr. Higgins in 9/2015.   Patient was diagnosed medulloblastoma at age 7 with a symptom of a headache and vomiting, he underwent brain surgery followed by chemotherapy and radiation therapy.  Patient developed gross hormone deficiency which was confirmed by biochemical test, was on growth hormone injection since end of 3/2010. Growth hormone stimulation testing 03/17/2010 showed a peak response following clonidine of 3 and arginine of 3.4 consistent with severe growth hormone deficiency. He was started on growth hormone therapy at the end of 03/2010. GH was discontinued in 4/2015. He did not have symptoms such as low energy, myalgia, arthralgia after discontinuing growth hormone injection.  He also has secondary hypothyroidism which he is currently on levothyroxine 125 mcg with good compliance.     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   Energy level: ok    Past Medical/Surgical History:  Past Medical History:   Diagnosis Date     Cancer (H)      No past surgical history on file.    Allergies:  Allergies   Allergen Reactions     Amoxicillin Hives     SUSR       Current Medications   Current Outpatient Medications   Medication     Acetaminophen (TYLENOL CHILDRENS PO)     calcium citrate-vitamin D (CALCIUM CITRATE + D) 315-250 MG-UNIT TABS per tablet     cetirizine (ZYRTEC) 10 MG tablet     fluticasone (FLONASE) 50 MCG/ACT nasal spray     ibuprofen (ADVIL,MOTRIN) 200 MG tablet     levothyroxine (SYNTHROID/LEVOTHROID) 125 MCG tablet     levothyroxine (SYNTHROID/LEVOTHROID) 125 MCG tablet     methylphenidate (CONCERTA) 18 MG CR tablet     pantoprazole (PROTONIX) 20 MG EC tablet     Testosterone 1.62 % GEL     No current facility-administered medications for this visit.        Family History:  Family History   Problem Relation Age of Onset     Cerebrovascular Disease Mother       Childhood Heart Surgery Father         PDA s/p repair     Hypertension Father    maternal aunt: ovarian cancer, maternal grandmother: lung cancer, maternal grandfather: prostate cancer    Social History:  Social History     Tobacco Use     Smoking status: Never Smoker     Smokeless tobacco: Never Used   Substance Use Topics     Alcohol use: Not on file   lives college campus, studying to become .     ROS:  Full review of systems taken with the help of the intake sheet. Otherwise a complete 14 point review of systems was taken and is negative unless stated in the history above.      Physical Exam:   There were no vitals taken for this visit.    General: well appearing, no acute distress, pleasant and conversant,   Mental Status/neuro: alert and oriented  Skull: microcephalia  Face: symmetrical, normal facial color  Eyes: anicteric, PERRL, no proptosis or lid lag  Neck: suppler, no lymphadenopahty  Thyroid: normal size and texture, no nodule palpable, no bruits  Chest : No hair growth , no gynecomastia   heart: regular rhythm, S1S2, no murmur appreciated  Lung: clear to auscultation bilaterally  Abdomen: soft, NT/ND, no hepatomegaly  Testicular exam: 15-20 mL each, symmetrical.  Legs: no swelling or edema        Labs : I reviewed data from epic and extract and summarize the pertinent data here.      Ref. Range 9/30/2015 12:20 9/20/2016 11:52 9/19/2017 12:09 9/18/2018 15:46 9/17/2019 13:45   Testosterone Total Latest Ref Range: 300 - 1,200 ng/dL 308 295 (L) 349     TSH Latest Ref Range: 0.40 - 4.00 mU/L <0.01 (L) 0.02 (L) 0.06 (L) 5.38 (H) 4.59 (H)   T4 Free Latest Ref Range: 0.76 - 1.46 ng/dL 1.60 (H) 1.46 1.37 1.04 1.14   Ins Growth Factor 1 Latest Ref Range: 137 - 428 ng/ml    232    Lutropin Latest Ref Range: 1.5 - 9.3 IU/L 3.5 2.6 5.9     FSH Latest Ref Range: 0.7 - 10.8 IU/L 9.3 7.9 7.8         MRI Brain: I personally reviewed the original images and agree with the below reports.   Results for orders  placed during the hospital encounter of 09/19/17   MRI Brain w & w/o contrast with susceptibility weighted imaging    Narrative  MR BRAIN W/O & W CONTRAST 9/19/2017 11:36 AM    History: Medulloblastoma diagnosed on 02/07/2008 at 7 years of age.  ?Initial surgical resection of his tumor with suboccipital craniotomy  and near total tumor resection on 02/07/2008.  ??  Patient was treated with subsequent chemo therapy and radiation  therapy.    ??  Comparison: Head MRI 9/20/2016, 9/30/2015 and 2/8/2008    Technique: Multiplanar T1-weighted, axial FLAIR, and susceptibility  images were obtained without intravenous contrast. Following  intravenous gadolinium-based contrast administration, axial  T2-weighted, diffusion, and T1-weighted images (in multiple planes)  were obtained.    Contrast dose: 6ml iv gadavist    Findings: Postsurgical changes of suboccipital craniotomy and  resection of fourth ventricle medulloblastoma. Hemosiderin staining  within the surgical cavity. Unchanged appearance of the resection  cavity with accompanying atrophic changes of the cerebellar vermis and  right cerebellar hemisphere likely secondary to a combination of  radiotherapy and surgery. On postcontrast series there is no enhancing  lesion to suggest residual lesion or recurrence. No abnormal  restricted diffusion within the surgical site to suggest tumor  recurrence. On susceptibility weighted imaging, there are stable  scattered punctate foci of susceptibility artifact throughout the  subcortical and periventricular white matter. These may represent  chronic microhemorrhages versus post radiation cavernomas.    Compared to previous MRI exams, there is no intracranial hemorrhage  mass effect or new enhancing lesion. Ventricular system appears stable  in size. Patent major flow voids. Venous sinuses appear normal.  Orbital structures are grossly unremarkable.    Again noted subtle increased T1 signal on noncontrast T1-weighted  images  within the left dentate nucleus which may represent  accumulation of gadolinium over the years.      Impression Impression:  1. Postsurgical changes of posterior fossa medulloblastoma resection  without any evidence of tumor recurrence.    2. Unchanged scattered foci of susceptibility artifact consistent with  either chronic microhemorrhages versus postradiation cavernomas.    3. Unchanged T1 signal in the basal ganglia and left dentate nucleus  which may represent accumulated gadolinium.    I have personally reviewed the examination and initial interpretation  and I agree with the findings.    FREDEIRCK SOTO MD

## 2020-09-15 DIAGNOSIS — E29.1 HYPOGONADISM MALE: ICD-10-CM

## 2020-09-15 DIAGNOSIS — E03.8 SECONDARY HYPOTHYROIDISM: Primary | ICD-10-CM

## 2020-09-21 ENCOUNTER — OFFICE VISIT (OUTPATIENT)
Dept: PEDIATRIC HEMATOLOGY/ONCOLOGY | Facility: CLINIC | Age: 20
End: 2020-09-21
Attending: NURSE PRACTITIONER
Payer: COMMERCIAL

## 2020-09-21 VITALS
TEMPERATURE: 98.4 F | HEART RATE: 105 BPM | SYSTOLIC BLOOD PRESSURE: 130 MMHG | BODY MASS INDEX: 30.08 KG/M2 | OXYGEN SATURATION: 98 % | DIASTOLIC BLOOD PRESSURE: 80 MMHG | HEIGHT: 60 IN | RESPIRATION RATE: 18 BRPM | WEIGHT: 153.22 LBS

## 2020-09-21 DIAGNOSIS — Z92.21 STATUS POST CHEMOTHERAPY: ICD-10-CM

## 2020-09-21 DIAGNOSIS — E03.8 SECONDARY HYPOTHYROIDISM: ICD-10-CM

## 2020-09-21 DIAGNOSIS — Z92.3 S/P RADIATION THERAPY: ICD-10-CM

## 2020-09-21 DIAGNOSIS — Z91.89 AT RISK FOR CARDIOMYOPATHY: ICD-10-CM

## 2020-09-21 DIAGNOSIS — C71.6 MEDULLOBLASTOMA OF CEREBELLUM (H): Primary | ICD-10-CM

## 2020-09-21 DIAGNOSIS — E29.1 SECONDARY MALE HYPOGONADISM: ICD-10-CM

## 2020-09-21 LAB
ANION GAP SERPL CALCULATED.3IONS-SCNC: 5 MMOL/L (ref 3–14)
BUN SERPL-MCNC: 8 MG/DL (ref 7–30)
CALCIUM SERPL-MCNC: 9.4 MG/DL (ref 8.5–10.1)
CHLORIDE SERPL-SCNC: 104 MMOL/L (ref 94–109)
CO2 SERPL-SCNC: 29 MMOL/L (ref 20–32)
CREAT SERPL-MCNC: 0.63 MG/DL (ref 0.66–1.25)
GFR SERPL CREATININE-BSD FRML MDRD: >90 ML/MIN/{1.73_M2}
GLUCOSE SERPL-MCNC: 145 MG/DL (ref 70–99)
POTASSIUM SERPL-SCNC: 3.5 MMOL/L (ref 3.4–5.3)
SODIUM SERPL-SCNC: 138 MMOL/L (ref 133–144)
T4 FREE SERPL-MCNC: 1.54 NG/DL (ref 0.76–1.46)
TSH SERPL DL<=0.005 MIU/L-ACNC: 0.38 MU/L (ref 0.4–4)

## 2020-09-21 PROCEDURE — G0008 ADMIN INFLUENZA VIRUS VAC: HCPCS | Mod: ZF

## 2020-09-21 PROCEDURE — G0463 HOSPITAL OUTPT CLINIC VISIT: HCPCS | Mod: ZF

## 2020-09-21 PROCEDURE — 80048 BASIC METABOLIC PNL TOTAL CA: CPT | Performed by: NURSE PRACTITIONER

## 2020-09-21 PROCEDURE — 90686 IIV4 VACC NO PRSV 0.5 ML IM: CPT | Mod: ZF | Performed by: NURSE PRACTITIONER

## 2020-09-21 PROCEDURE — 25000128 H RX IP 250 OP 636: Mod: ZF | Performed by: NURSE PRACTITIONER

## 2020-09-21 PROCEDURE — 84439 ASSAY OF FREE THYROXINE: CPT | Performed by: NURSE PRACTITIONER

## 2020-09-21 PROCEDURE — 84443 ASSAY THYROID STIM HORMONE: CPT | Performed by: NURSE PRACTITIONER

## 2020-09-21 PROCEDURE — 84403 ASSAY OF TOTAL TESTOSTERONE: CPT | Performed by: NURSE PRACTITIONER

## 2020-09-21 PROCEDURE — 36415 COLL VENOUS BLD VENIPUNCTURE: CPT | Performed by: NURSE PRACTITIONER

## 2020-09-21 PROCEDURE — 82306 VITAMIN D 25 HYDROXY: CPT | Performed by: NURSE PRACTITIONER

## 2020-09-21 RX ORDER — CETIRIZINE HYDROCHLORIDE, PSEUDOEPHEDRINE HYDROCHLORIDE 5; 120 MG/1; MG/1
TABLET, FILM COATED, EXTENDED RELEASE ORAL
COMMUNITY
Start: 2020-01-28 | End: 2020-09-21

## 2020-09-21 RX ADMIN — INFLUENZA A VIRUS A/GUANGDONG-MAONAN/SWL1536/2019 CNIC-1909 (H1N1) ANTIGEN (FORMALDEHYDE INACTIVATED), INFLUENZA A VIRUS A/HONG KONG/2671/2019 (H3N2) ANTIGEN (FORMALDEHYDE INACTIVATED), INFLUENZA B VIRUS B/PHUKET/3073/2013 ANTIGEN (FORMALDEHYDE INACTIVATED), AND INFLUENZA B VIRUS B/WASHINGTON/02/2019 ANTIGEN (FORMALDEHYDE INACTIVATED) 0.5 ML: 15; 15; 15; 15 INJECTION, SUSPENSION INTRAMUSCULAR at 12:33

## 2020-09-21 ASSESSMENT — MIFFLIN-ST. JEOR: SCORE: 1543.12

## 2020-09-21 ASSESSMENT — PAIN SCALES - GENERAL: PAINLEVEL: NO PAIN (0)

## 2020-09-21 NOTE — PROGRESS NOTES
We had the pleasure of seeing your patient, David Beaulieu, at the [facility] Childhood Cancer Survivorship Program.  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  His tumor was located in his posterior fossa.  He was treated at the [facility] under the direction of Dr. Grant Rosenbaum.  David had initial surgical resection of his tumor which was as follows:   1.  Suboccipital craniotomy and near total tumor resection on 02/07/2008 with Dr. Wilber Valenzuela at the [facility].        David was treated as per the [facility] protocol [id number] and received the following chemotherapy:   1.  Vincristine intravenously.   2.  Cis-retinoic acid orally.   3.  Cisplatin intravenously with cumulative dose of 394 mg/meter squared.   4.  Cyclophosphamide intravenously with a cumulative dose of 12 grams/meter squared.      David also received radiation therapy as part of his treatment which is as follows:   1.  Whole brain radiation which started on 03/04/2008 and was completed on 03/31/2008 in 20 fractions, for a total dose of 3600 centigray.   2.  Spine radiation which started on 03/04/2008 was completed on 03/31/2008 in 20 fractions, for a total dose of 3600 centigray.   3.  Posterior fossa boost, which started on 04/01/2008 and was completed on 04/15/2008 in 11 fractions, for a total of 1972 centigray.   4.  Total dose to posterior fossa is 5572 centigray.      David's acute and chronic late effects known to date include:   1.  Severe bilateral sensorineural hearing loss diagnosed on 08/21/2008- wears hearing aids.   2.  Growth deceleration found on 09/22/2009.   3.  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   4.  Lower extremity weakness diagnosed on 11/03/2009, for which he started physical therapy.   5.  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   6.   "Problems with executive functioning diagnosed on 10/25/2010.   7.  Slow processing speed diagnosed on 10/25/2010.   8.  Problems with fine motor skills diagnosed on 10/25/2010.   9.  Reduced spinal growth diagnosed on 07/24/2012.   10.  Problems with sustained attention diagnosed on 08/17/2012.   11.  Problems with emotional lability.   12.  Depression which was diagnosed on 12/03/2014.   13.  Left ventricular dysfunction diagnosed 9/2015       HISTORY OF PRESENT ILLNESS:  David reports to his long term follow up evaluation with his father.  He has been doing well.  Seen by endocrine virtually last week.  Getting labs here today.  Started testosterone injections weekly.  Has been doing them himself and it is going well.  Would like a flu shot today.  No current cardiovascular concerns.  He had an echo last year that was normal.     He had local PFTs in 2018 that were at the lower limits of normal.  He saw local pulmonary and there were no concerns.   With regards to his attention, he is still taking Concerta.    He did disclose last year that he is stopping going to college.  He says that he isn't \"book smart\" and \"school isn't for me.\"  He said that he was using his accommodations but he decided that he would rather work and he may not need a degree to do the job that he wants to do.   He last had neuropsych testing done in 12/2014.  David denies any headaches, vision changes.  He went for an eye doctor visit last year and is due now.   He denies any seizure-like activity or tremors.  No back pain, no fever.   With regards to his hearing, he received hearing aids 4 years ago.  He had his hearing tested this month locally.    Sleep and appetite good.  No skin changes.  He did have 2 cavities at the dentist last visit.  Those were filled.  He is due for another dental visit and hasn't been in a while.  He feels like his 2 upper molars are chipping.  He said that he has trouble getting dentists in his area that take " "his insurance..     REVIEW OF SYSTEMS:  Comprehensive review of systems was negative except as started above.      CURRENT MEDICATIONS:   Current Outpatient Medications   Medication     Acetaminophen (TYLENOL CHILDRENS PO)     cetirizine (ZYRTEC) 10 MG tablet     ibuprofen (ADVIL,MOTRIN) 200 MG tablet     levothyroxine (SYNTHROID/LEVOTHROID) 125 MCG tablet     levothyroxine (SYNTHROID/LEVOTHROID) 125 MCG tablet     syringe/needle, disp, 23G X 1\" 3 ML MISC     Testosterone 1.62 % GEL     testosterone cypionate (DEPOTESTOSTERONE) 200 MG/ML injection     calcium citrate-vitamin D (CALCIUM CITRATE + D) 315-250 MG-UNIT TABS per tablet     fluticasone (FLONASE) 50 MCG/ACT nasal spray     methylphenidate (CONCERTA) 18 MG CR tablet     pantoprazole (PROTONIX) 20 MG EC tablet     No current facility-administered medications for this visit.           ALLERGIES:  Amoxicillin.      FAMILY HISTORY:     Family History   Problem Relation Age of Onset     Cerebrovascular Disease Mother      Childhood Heart Surgery Father         PDA s/p repair     Hypertension Father    PGF, PGGF with cataracts.         SOCIAL HISTORY:  David completed his 2nd year of college at [email or web address] in [place].   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        PHYSICAL EXAMINATION:     VITAL SIGNS:   /80 (BP Location: Right arm, Patient Position: Fowlers, Cuff Size: Adult Large)   Pulse 105   Temp 98.4  F (36.9  C) (Oral)   Resp 18   Ht 1.517 m (4' 11.72\")   Wt 69.5 kg (153 lb 3.5 oz)   SpO2 98%   BMI 30.20 kg/m         Wt Readings from Last 3 Encounters:   09/21/20 69.5 kg (153 lb 3.5 oz)   09/17/19 64.9 kg (143 lb)   09/17/19 64.9 kg (143 lb 1.3 oz)     Ht Readings from Last 2 Encounters:   09/21/20 1.517 m (4' 11.72\")   09/17/19 1.499 m (4' 11\")     Facility age limit for growth percentiles is 20 years.      GENERAL:  David appears to be in no acute distress.  He is " alert and oriented x3 and well-nourished.   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  Bilateral tympanic membranes are within normal limits.  Pupils are equally round and reactive to light and accommodation.  Extraocular movements are intact.  Fundoscopic exam negative for cataracts.  Oropharynx is clear without lesions. Some chipping noted in upper molars.  NECK:  Supple.  Thyroid is nonpalpable.  There is a well-healed occipital surgical scar.   CARDIOVASCULAR:  Regular rate and rhythm with no murmurs, rubs or gallops.  Intact distal pulses.   RESPIRATORY:  Breath sounds are clear to auscultation bilaterally with normal effort and no distress.  No wheezes, crackles or rales were auscultated.   ABDOMEN:  Soft, nondistended, nontender, with bowel sounds in all 4 quadrants, no palpable masses or tenderness.   MUSCULOSKELETAL:  Normal range of motion. Muscular hypoplasia to paraspinous muscles - stable  LYMPHATIC:  Patient has no lymphadenopathy.   NEUROLOGIC:  David is alert and oriented x3, has 2+ lower bilateral reflexes.  He has no cranial nerve deficits and has normal muscle tone.  GCS score is 15. .  Gait WNL.    SKIN:  Skin is warm and dry with no rashes or erythema.   PSYCHIATRIC:  Mood and affect are within normal limits.      LABORATORY STUDIES:   Results for orders placed or performed in visit on 09/21/20   Basic metabolic panel     Status: Abnormal   Result Value Ref Range    Sodium 138 133 - 144 mmol/L    Potassium 3.5 3.4 - 5.3 mmol/L    Chloride 104 94 - 109 mmol/L    Carbon Dioxide 29 20 - 32 mmol/L    Anion Gap 5 3 - 14 mmol/L    Glucose 145 (H) 70 - 99 mg/dL    Urea Nitrogen 8 7 - 30 mg/dL    Creatinine 0.63 (L) 0.66 - 1.25 mg/dL    GFR Estimate >90 >60 mL/min/[1.73_m2]    GFR Estimate If Black >90 >60 mL/min/[1.73_m2]    Calcium 9.4 8.5 - 10.1 mg/dL   T4 free     Status: Abnormal   Result Value Ref Range    T4 Free 1.54 (H) 0.76 - 1.46 ng/dL   TSH     Status:  Abnormal   Result Value Ref Range    TSH 0.38 (L) 0.40 - 4.00 mU/L       IMAGING: none today    ASSESSMENT AND PLAN:  David Beaulieu is a now 19 year-old  male with a history of medulloblastoma that completed treatment in 04/2009 with cis-retinoic acid.  He has been off therapy for 10 years.  He has some late effects in the area of endocrinopathies as well as neurocognitive likely due to his radiation therapy.  He also has hearing loss from his cisplatin treatment.   Has some dental changes and due to see the dentist.  The following are our long term recommendations:    New late effects:  Dental caries     1.  Risk of recurrence:  David is currently 10 years from the end of treatment for his medulloblastoma.  Given the distance from the diagnosis of his primary malignancy, the likelihood of recurrence continues to decline. Now that he is 10 years off therapy we can extend his MRI to 2 years from now.  Currently we are monitoring his radiation changes.  We will reimage in 2021.    2.  Psychosocial effects:  David had previous neuropsych testing and was found to have problems with sustained attention, processing speed and some motor skills.  He has accommodations in college but has chosen to take a break from there and work.  If he notices any significant changes in cognition, we would recommend repeat testing.     3.  Risk for cardiac toxicity:  David has a history of spine radiation, which puts him at risk for developing cardiomyopathy and valvular dysfunction.  We recommend he have an echocardiogram every 5 years.  Baseline study in 2015 showed decreased LVEF to 47% in 4 chamber and 52% biplane; also small PFO with L to R shunting.  He saw peds cardiology in 2015 but family would like to follow up locally.  He had a local echocardiogram last year that was completely normal.  We will plan to repeat the echo in 2 years given that the last 2 have been stable (2021).   There is also increasing evidence  regarding metabolic syndrome in cancer survivors.  We would recommend that David have fasting lipids followed at least every 2-3 years.  Last done in 2015 but he wasn't fasting today.  He should try to have it completed with primary care.    4.  Hearing loss:  David has a history of sensorineural hearing loss likely related to his posterior fossa radiation and cisplatin treatment.  He has hearing aids now and should continue his exams locally.  He is due for an exam in the near future.    5.  Risk for peripheral neuropathy:  David has a history of vincristine exposure, which does put him at increased risk for peripheral motor neuropathy.  He does have some previous issues with fine motor skills, but these appear to be improved.     6.  Endocrinopathies:  David has a history of growth hormone deficiency and hypothyroidism, which are both likely from his radiation treatment.  He should have regular followup with Endocrinology as directed.     7.  Risk for secondary neoplasms/vascular changes:  David has a history of radiation, which does put him at increased risk for secondary neoplasms.  I counseled him that should he notice any changes in his cognitive status, any new lumps, bumps, rashes that they should be promptly evaluated given his history of radiation. 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   He will have an MRI done in 24 months to evaluate post his whole brain radiation.  I did discuss with his father that we would stop doing spine examinations at this point and solely do brain MRI.   He had a new likely radiation induced blooming artifact that we will continue to follow.  I did also discuss the risk for stroke post radiation and reviewed symptoms.  Mom does have a history of CVA and dad is unsure if she has any underlying disorder that caused this.  CBC today is normal.    8.  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  He currently has no back pain or concerns.     9.  Risk for kidney toxicity:  David has a history of cisplatin and cyclophosphamide treatment, which can cause kidney and bladder dysfunction.  David's baseline CMP and UA were within normal limits.  We recommend that he have yearly assessments of his blood pressure and urinalysis.      10. Risk for cataracts:  David has a history of whole brain radiation so he has a risk for cataracts.  He should have regular eye exams completed.  David will make an appt in the near future.  No cataracts noted on exam today.       It was a pleasure seeing David in our Cancer Survivorship Program.  We appreciate the opportunity to participate in your patient's care.  If you have any questions or concerns, please do not hesitate to contact us at [phone].   We ask that David return in 12 months for an exam/labs.  He will get his next audiogram locally.     We will try to coordinate with endocrine.    Next MRI and echo in 2021.          Erica Knight MSN, APRN, CPNP-AC, CPON  Department of Pediatrics  Division of Hematology/Oncology

## 2020-09-21 NOTE — LETTER
9/21/2020      RE: David Beaulieu  [address]  [place] [address]              We had the pleasure of seeing your patient, David Beaulieu, at the [facility] Cancer Survivorship Program.  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  His tumor was located in his posterior fossa.  He was treated at the [facility] under the direction of Dr. Grant Rosenbaum.  David had initial surgical resection of his tumor which was as follows:   1.  Suboccipital craniotomy and near total tumor resection on 02/07/2008 with Dr. Wilber Valenzuela at the [facility].        David was treated as per the [facility] protocol [id number] and received the following chemotherapy:   1.  Vincristine intravenously.   2.  Cis-retinoic acid orally.   3.  Cisplatin intravenously with cumulative dose of 394 mg/meter squared.   4.  Cyclophosphamide intravenously with a cumulative dose of 12 grams/meter squared.      David also received radiation therapy as part of his treatment which is as follows:   1.  Whole brain radiation which started on 03/04/2008 and was completed on 03/31/2008 in 20 fractions, for a total dose of 3600 centigray.   2.  Spine radiation which started on 03/04/2008 was completed on 03/31/2008 in 20 fractions, for a total dose of 3600 centigray.   3.  Posterior fossa boost, which started on 04/01/2008 and was completed on 04/15/2008 in 11 fractions, for a total of 1972 centigray.   4.  Total dose to posterior fossa is 5572 centigray.      David's acute and chronic late effects known to date include:   1.  Severe bilateral sensorineural hearing loss diagnosed on 08/21/2008- wears hearing aids.   2.  Growth deceleration found on 09/22/2009.   3.  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   4.  Lower extremity weakness diagnosed on 11/03/2009, for which he started  "physical therapy.   5.  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   6.  Problems with executive functioning diagnosed on 10/25/2010.   7.  Slow processing speed diagnosed on 10/25/2010.   8.  Problems with fine motor skills diagnosed on 10/25/2010.   9.  Reduced spinal growth diagnosed on 07/24/2012.   10.  Problems with sustained attention diagnosed on 08/17/2012.   11.  Problems with emotional lability.   12.  Depression which was diagnosed on 12/03/2014.   13.  Left ventricular dysfunction diagnosed 9/2015       HISTORY OF PRESENT ILLNESS:  David reports to his long term follow up evaluation with his father.  He has been doing well.  Seen by endocrine virtually last week.  Getting labs here today.  Started testosterone injections weekly.  Has been doing them himself and it is going well.  Would like a flu shot today.  No current cardiovascular concerns.  He had an echo last year that was normal.     He had local PFTs in 2018 that were at the lower limits of normal.  He saw local pulmonary and there were no concerns.   With regards to his attention, he is still taking Concerta.    He did disclose last year that he is stopping going to college.  He says that he isn't \"book smart\" and \"school isn't for me.\"  He said that he was using his accommodations but he decided that he would rather work and he may not need a degree to do the job that he wants to do.   He last had neuropsych testing done in 12/2014.  David denies any headaches, vision changes.  He went for an eye doctor visit last year and is due now.   He denies any seizure-like activity or tremors.  No back pain, no fever.   With regards to his hearing, he received hearing aids 4 years ago.  He had his hearing tested this month locally.    Sleep and appetite good.  No skin changes.  He did have 2 cavities at the dentist last visit.  Those were filled.  He is due for another dental visit and hasn't been in a while.  He feels like his 2 upper " "molars are chipping.  He said that he has trouble getting dentists in his area that take his insurance..     REVIEW OF SYSTEMS:  Comprehensive review of systems was negative except as started above.      CURRENT MEDICATIONS:   Current Outpatient Medications   Medication     Acetaminophen (TYLENOL CHILDRENS PO)     cetirizine (ZYRTEC) 10 MG tablet     ibuprofen (ADVIL,MOTRIN) 200 MG tablet     levothyroxine (SYNTHROID/LEVOTHROID) 125 MCG tablet     levothyroxine (SYNTHROID/LEVOTHROID) 125 MCG tablet     syringe/needle, disp, 23G X 1\" 3 ML MISC     Testosterone 1.62 % GEL     testosterone cypionate (DEPOTESTOSTERONE) 200 MG/ML injection     calcium citrate-vitamin D (CALCIUM CITRATE + D) 315-250 MG-UNIT TABS per tablet     fluticasone (FLONASE) 50 MCG/ACT nasal spray     methylphenidate (CONCERTA) 18 MG CR tablet     pantoprazole (PROTONIX) 20 MG EC tablet     No current facility-administered medications for this visit.           ALLERGIES:  Amoxicillin.      FAMILY HISTORY:     Family History   Problem Relation Age of Onset     Cerebrovascular Disease Mother      Childhood Heart Surgery Father         PDA s/p repair     Hypertension Father    PGF, PGGF with cataracts.         SOCIAL HISTORY:  David completed his 2nd year of college at [employer] in [place].   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        PHYSICAL EXAMINATION:     VITAL SIGNS:   /80 (BP Location: Right arm, Patient Position: Fowlers, Cuff Size: Adult Large)   Pulse 105   Temp 98.4  F (36.9  C) (Oral)   Resp 18   Ht 1.517 m (4' 11.72\")   Wt 69.5 kg (153 lb 3.5 oz)   SpO2 98%   BMI 30.20 kg/m         Wt Readings from Last 3 Encounters:   09/21/20 69.5 kg (153 lb 3.5 oz)   09/17/19 64.9 kg (143 lb)   09/17/19 64.9 kg (143 lb 1.3 oz)     Ht Readings from Last 2 Encounters:   09/21/20 1.517 m (4' 11.72\")   09/17/19 1.499 m (4' 11\")     Facility age limit for growth " percentiles is 20 years.      GENERAL:  David appears to be in no acute distress.  He is alert and oriented x3 and well-nourished.   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  Bilateral tympanic membranes are within normal limits.  Pupils are equally round and reactive to light and accommodation.  Extraocular movements are intact.  Fundoscopic exam negative for cataracts.  Oropharynx is clear without lesions. Some chipping noted in upper molars.  NECK:  Supple.  Thyroid is nonpalpable.  There is a well-healed occipital surgical scar.   CARDIOVASCULAR:  Regular rate and rhythm with no murmurs, rubs or gallops.  Intact distal pulses.   RESPIRATORY:  Breath sounds are clear to auscultation bilaterally with normal effort and no distress.  No wheezes, crackles or rales were auscultated.   ABDOMEN:  Soft, nondistended, nontender, with bowel sounds in all 4 quadrants, no palpable masses or tenderness.   MUSCULOSKELETAL:  Normal range of motion. Muscular hypoplasia to paraspinous muscles - stable  LYMPHATIC:  Patient has no lymphadenopathy.   NEUROLOGIC:  David is alert and oriented x3, has 2+ lower bilateral reflexes.  He has no cranial nerve deficits and has normal muscle tone.  GCS score is 15. .  Gait WNL.    SKIN:  Skin is warm and dry with no rashes or erythema.   PSYCHIATRIC:  Mood and affect are within normal limits.      LABORATORY STUDIES:   Results for orders placed or performed in visit on 09/21/20   Basic metabolic panel     Status: Abnormal   Result Value Ref Range    Sodium 138 133 - 144 mmol/L    Potassium 3.5 3.4 - 5.3 mmol/L    Chloride 104 94 - 109 mmol/L    Carbon Dioxide 29 20 - 32 mmol/L    Anion Gap 5 3 - 14 mmol/L    Glucose 145 (H) 70 - 99 mg/dL    Urea Nitrogen 8 7 - 30 mg/dL    Creatinine 0.63 (L) 0.66 - 1.25 mg/dL    GFR Estimate >90 >60 mL/min/[1.73_m2]    GFR Estimate If Black >90 >60 mL/min/[1.73_m2]    Calcium 9.4 8.5 - 10.1 mg/dL   T4 free     Status:  Abnormal   Result Value Ref Range    T4 Free 1.54 (H) 0.76 - 1.46 ng/dL   TSH     Status: Abnormal   Result Value Ref Range    TSH 0.38 (L) 0.40 - 4.00 mU/L       IMAGING: none today    ASSESSMENT AND PLAN:  David Beaulieu is a now 19 year-old  male with a history of medulloblastoma that completed treatment in 04/2009 with cis-retinoic acid.  He has been off therapy for 10 years.  He has some late effects in the area of endocrinopathies as well as neurocognitive likely due to his radiation therapy.  He also has hearing loss from his cisplatin treatment.   Has some dental changes and due to see the dentist.  The following are our long term recommendations:     1.  Risk of recurrence:  David is currently 10 years from the end of treatment for his medulloblastoma.  Given the distance from the diagnosis of his primary malignancy, the likelihood of recurrence continues to decline. Now that he is 10 years off therapy we can extend his MRI to 2 years from now.  Currently we are monitoring his radiation changes.  We will reimage in 2021.    2.  Psychosocial effects:  David had previous neuropsych testing and was found to have problems with sustained attention, processing speed and some motor skills.  He has accommodations in college but has chosen to take a break from there and work.  If he notices any significant changes in cognition, we would recommend repeat testing.     3.  Risk for cardiac toxicity:  David has a history of spine radiation, which puts him at risk for developing cardiomyopathy and valvular dysfunction.  We recommend he have an echocardiogram every 5 years.  Baseline study in 2015 showed decreased LVEF to 47% in 4 chamber and 52% biplane; also small PFO with L to R shunting.  He saw peds cardiology in 2015 but family would like to follow up locally.  He had a local echocardiogram last year that was completely normal.  We will plan to repeat the echo in 2 years given that the last 2 have been  stable (2021).   There is also increasing evidence regarding metabolic syndrome in cancer survivors.  We would recommend that David have fasting lipids followed at least every 2-3 years.  Last done in 2015 but he wasn't fasting today.  He should try to have it completed with primary care.    4.  Hearing loss:  David has a history of sensorineural hearing loss likely related to his posterior fossa radiation and cisplatin treatment.  He has hearing aids now and should continue his exams locally.  He is due for an exam in the near future.    5.  Risk for peripheral neuropathy:  David has a history of vincristine exposure, which does put him at increased risk for peripheral motor neuropathy.  He does have some previous issues with fine motor skills, but these appear to be improved.     6.  Endocrinopathies:  David has a history of growth hormone deficiency and hypothyroidism, which are both likely from his radiation treatment.  He should have regular followup with Endocrinology as directed.     7.  Risk for secondary neoplasms/vascular changes:  David has a history of radiation, which does put him at increased risk for secondary neoplasms.  I counseled him that should he notice any changes in his cognitive status, any new lumps, bumps, rashes that they should be promptly evaluated given his history of radiation. 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   He will have an MRI done in 24 months to evaluate post his whole brain radiation.  I did discuss with his father that we would stop doing spine examinations at this point and solely do brain MRI.   He had a new likely radiation induced blooming artifact that we will continue to follow.  I did also discuss the risk for stroke post radiation and reviewed symptoms.  Mom does have a history of CVA and dad is unsure if she has any underlying disorder that caused this.  CBC today is normal.    8.  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  He currently has no back pain or concerns.     9.  Risk for kidney toxicity:  David has a history of cisplatin and cyclophosphamide treatment, which can cause kidney and bladder dysfunction.  David's baseline CMP and UA were within normal limits.  We recommend that he have yearly assessments of his blood pressure and urinalysis.      10. Risk for cataracts:  David has a history of whole brain radiation so he has a risk for cataracts.  He should have regular eye exams completed.  David will make an appt in the near future.  No cataracts noted on exam today.       It was a pleasure seeing David in our Cancer Survivorship Program.  We appreciate the opportunity to participate in your patient's care.  If you have any questions or concerns, please do not hesitate to contact us at [phone].   We ask that David return in 12 months for an exam/labs.  He will get his next audiogram locally.     We will try to coordinate with endocrine.    Next MRI and echo in 2021.          Erica Knight MSN, APRN, CPNP-AC, CPON  Department of Pediatrics  Division of Hematology/Oncology

## 2020-09-21 NOTE — NURSING NOTE
"Chief Complaint   Patient presents with     RECHECK     Patient is here for Medulloblastoma follow up       /80 (BP Location: Right arm, Patient Position: Fowlers, Cuff Size: Adult Large)   Pulse 105   Temp 98.4  F (36.9  C) (Oral)   Resp 18   Ht 1.517 m (4' 11.72\")   Wt 69.5 kg (153 lb 3.5 oz)   SpO2 98%   BMI 30.20 kg/m      Moni Ibrahim, EMT  September 21, 2020  "

## 2020-09-22 LAB
DEPRECATED CALCIDIOL+CALCIFEROL SERPL-MC: 38 UG/L (ref 20–75)
TESTOST SERPL-MCNC: 463 NG/DL (ref 240–950)

## 2020-09-22 NOTE — RESULT ENCOUNTER NOTE
Dear David     Here is your results. Looks ok but can be better. Lets try injection taht we discussed.     Please call nursing line, if you are [facility] patient at [phone], if you are [place] patient at [phone],  if you have any questions.    Please take care  Karen Caruso MD

## 2020-09-23 DIAGNOSIS — E03.8 SECONDARY HYPOTHYROIDISM: Primary | ICD-10-CM

## 2020-09-23 RX ORDER — LEVOTHYROXINE SODIUM 112 UG/1
112 TABLET ORAL DAILY
Qty: 90 TABLET | Refills: 3 | Status: SHIPPED | OUTPATIENT
Start: 2020-09-23 | End: 2021-10-15

## 2020-11-04 ENCOUNTER — MYC MEDICAL ADVICE (OUTPATIENT)
Dept: ENDOCRINOLOGY | Facility: CLINIC | Age: 20
End: 2020-11-04

## 2020-11-04 DIAGNOSIS — E29.1 MALE HYPOGONADISM: Primary | ICD-10-CM

## 2020-11-11 NOTE — TELEPHONE ENCOUNTER
"Syringe/Needle, Disp, 18G X 1-1/2\" 3 ML MISC  Syringe/Needle, Disp, 23G X 1-1/2\" 3 ML MISC 12 each 3 11/5/2020  --   Sig: Use to draw up testosterone for injection IM. 1x weekly.   Sent to pharmacy as: Syringe/Needle (Disp) 23G X 1-1/2\" 3 ML   Class: E-Prescribe   Order: [id number]   E-Prescribing Status: Receipt confirmed by pharmacy (11/5/2020  8:09 AM CST)   Printout Tracking    External Result Report   Medication Administration Instructions    Use to draw up testosterone for injection IM. 1x weekly.   Pharmacy    [id number] - [place], [place] - [address] VALERIE Decker RN  Central Triage Red Flags/Med Refills      "

## 2020-12-06 ENCOUNTER — HEALTH MAINTENANCE LETTER (OUTPATIENT)
Age: 20
End: 2020-12-06

## 2020-12-15 ENCOUNTER — TELEPHONE (OUTPATIENT)
Dept: ENDOCRINOLOGY | Facility: CLINIC | Age: 20
End: 2020-12-15

## 2020-12-15 NOTE — TELEPHONE ENCOUNTER
Lab slip for testosterone faxed to [facility] [phone] per Dr Shady Huber RN on 12/15/2020 at 8:29 AM

## 2021-04-16 DIAGNOSIS — E29.1 SECONDARY MALE HYPOGONADISM: ICD-10-CM

## 2021-04-16 RX ORDER — TESTOSTERONE CYPIONATE 200 MG/ML
100 INJECTION, SOLUTION INTRAMUSCULAR
Qty: 10 ML | Refills: 5 | Status: SHIPPED | OUTPATIENT
Start: 2021-04-16

## 2021-04-16 RX ORDER — TESTOSTERONE CYPIONATE 200 MG/ML
100 INJECTION, SOLUTION INTRAMUSCULAR WEEKLY
Qty: 10 ML | Refills: 5 | Status: SHIPPED | OUTPATIENT
Start: 2021-04-16 | End: 2021-09-23

## 2021-04-16 NOTE — TELEPHONE ENCOUNTER
Testosterone Cypionate Intramuscular Solution 200 MG/ML      Last Written Prescription Date:  9/14/20  Last Fill Quantity: 10 ml,   # refills: 5  Last Office Visit : 9/14/20  Future Office visit:  9/22/21    Routing refill request to provider for review/approval because:  Drug controlled substance

## 2021-09-21 ENCOUNTER — HOSPITAL ENCOUNTER (OUTPATIENT)
Dept: CARDIOLOGY | Facility: CLINIC | Age: 21
End: 2021-09-21
Attending: NURSE PRACTITIONER
Payer: COMMERCIAL

## 2021-09-21 ENCOUNTER — OFFICE VISIT (OUTPATIENT)
Dept: PEDIATRIC HEMATOLOGY/ONCOLOGY | Facility: CLINIC | Age: 21
End: 2021-09-21
Attending: NURSE PRACTITIONER
Payer: COMMERCIAL

## 2021-09-21 ENCOUNTER — HOSPITAL ENCOUNTER (OUTPATIENT)
Dept: MRI IMAGING | Facility: CLINIC | Age: 21
End: 2021-09-21
Attending: NURSE PRACTITIONER
Payer: COMMERCIAL

## 2021-09-21 VITALS
RESPIRATION RATE: 18 BRPM | HEIGHT: 60 IN | DIASTOLIC BLOOD PRESSURE: 86 MMHG | HEART RATE: 80 BPM | OXYGEN SATURATION: 99 % | SYSTOLIC BLOOD PRESSURE: 131 MMHG | TEMPERATURE: 97.9 F | WEIGHT: 145.5 LBS | BODY MASS INDEX: 28.57 KG/M2

## 2021-09-21 DIAGNOSIS — E03.8 SECONDARY HYPOTHYROIDISM: ICD-10-CM

## 2021-09-21 DIAGNOSIS — Z00.6 EXAMINATION OF PARTICIPANT OR CONTROL IN CLINICAL RESEARCH: ICD-10-CM

## 2021-09-21 DIAGNOSIS — C71.6 MEDULLOBLASTOMA OF CEREBELLUM (H): Primary | ICD-10-CM

## 2021-09-21 DIAGNOSIS — C71.6 MEDULLOBLASTOMA OF CEREBELLUM (H): ICD-10-CM

## 2021-09-21 DIAGNOSIS — Z92.21 STATUS POST CHEMOTHERAPY: ICD-10-CM

## 2021-09-21 DIAGNOSIS — E29.1 SECONDARY MALE HYPOGONADISM: ICD-10-CM

## 2021-09-21 DIAGNOSIS — K21.9 GASTROESOPHAGEAL REFLUX DISEASE WITHOUT ESOPHAGITIS: ICD-10-CM

## 2021-09-21 DIAGNOSIS — Z92.3 S/P RADIATION THERAPY: ICD-10-CM

## 2021-09-21 DIAGNOSIS — Z91.89 AT RISK FOR CARDIOMYOPATHY: ICD-10-CM

## 2021-09-21 LAB
ANION GAP SERPL CALCULATED.3IONS-SCNC: 4 MMOL/L (ref 3–14)
BUN SERPL-MCNC: 10 MG/DL (ref 7–30)
CALCIUM SERPL-MCNC: 8.9 MG/DL (ref 8.5–10.1)
CHLORIDE BLD-SCNC: 102 MMOL/L (ref 94–109)
CO2 SERPL-SCNC: 29 MMOL/L (ref 20–32)
CORTIS SERPL-MCNC: 6.2 UG/DL (ref 4–22)
CREAT SERPL-MCNC: 0.74 MG/DL (ref 0.66–1.25)
GFR SERPL CREATININE-BSD FRML MDRD: >90 ML/MIN/1.73M2
GLUCOSE BLD-MCNC: 81 MG/DL (ref 70–99)
HBA1C MFR BLD: 5.4 % (ref 0–5.6)
POTASSIUM BLD-SCNC: 3.1 MMOL/L (ref 3.4–5.3)
SODIUM SERPL-SCNC: 135 MMOL/L (ref 133–144)
T4 FREE SERPL-MCNC: 1.26 NG/DL (ref 0.76–1.46)
TSH SERPL DL<=0.005 MIU/L-ACNC: 1.1 MU/L (ref 0.4–4)

## 2021-09-21 PROCEDURE — 93306 TTE W/DOPPLER COMPLETE: CPT

## 2021-09-21 PROCEDURE — 255N000002 HC RX 255 OP 636: Performed by: NURSE PRACTITIONER

## 2021-09-21 PROCEDURE — G0463 HOSPITAL OUTPT CLINIC VISIT: HCPCS

## 2021-09-21 PROCEDURE — 84403 ASSAY OF TOTAL TESTOSTERONE: CPT | Performed by: NURSE PRACTITIONER

## 2021-09-21 PROCEDURE — 84305 ASSAY OF SOMATOMEDIN: CPT | Performed by: NURSE PRACTITIONER

## 2021-09-21 PROCEDURE — 70553 MRI BRAIN STEM W/O & W/DYE: CPT | Mod: 26 | Performed by: STUDENT IN AN ORGANIZED HEALTH CARE EDUCATION/TRAINING PROGRAM

## 2021-09-21 PROCEDURE — 83036 HEMOGLOBIN GLYCOSYLATED A1C: CPT | Performed by: NURSE PRACTITIONER

## 2021-09-21 PROCEDURE — 84443 ASSAY THYROID STIM HORMONE: CPT | Performed by: NURSE PRACTITIONER

## 2021-09-21 PROCEDURE — A9585 GADOBUTROL INJECTION: HCPCS | Performed by: NURSE PRACTITIONER

## 2021-09-21 PROCEDURE — 99215 OFFICE O/P EST HI 40 MIN: CPT | Performed by: NURSE PRACTITIONER

## 2021-09-21 PROCEDURE — 70553 MRI BRAIN STEM W/O & W/DYE: CPT

## 2021-09-21 PROCEDURE — 93306 TTE W/DOPPLER COMPLETE: CPT | Mod: 26 | Performed by: PEDIATRICS

## 2021-09-21 PROCEDURE — 80048 BASIC METABOLIC PNL TOTAL CA: CPT | Performed by: NURSE PRACTITIONER

## 2021-09-21 PROCEDURE — 36415 COLL VENOUS BLD VENIPUNCTURE: CPT | Performed by: NURSE PRACTITIONER

## 2021-09-21 PROCEDURE — 82533 TOTAL CORTISOL: CPT | Performed by: NURSE PRACTITIONER

## 2021-09-21 PROCEDURE — 84439 ASSAY OF FREE THYROXINE: CPT | Performed by: NURSE PRACTITIONER

## 2021-09-21 RX ORDER — GADOBUTROL 604.72 MG/ML
7.5 INJECTION INTRAVENOUS ONCE
Status: COMPLETED | OUTPATIENT
Start: 2021-09-21 | End: 2021-09-21

## 2021-09-21 RX ORDER — FAMOTIDINE 20 MG/1
20 TABLET, FILM COATED ORAL 2 TIMES DAILY PRN
Qty: 60 TABLET | Refills: 3 | Status: SHIPPED | OUTPATIENT
Start: 2021-09-21 | End: 2022-02-21

## 2021-09-21 RX ORDER — DICLOFENAC SODIUM 10 MG/G
GEL TOPICAL
COMMUNITY
Start: 2021-09-08

## 2021-09-21 RX ORDER — NAPROXEN 500 MG/1
500 TABLET ORAL PRN
COMMUNITY
Start: 2021-09-08

## 2021-09-21 RX ADMIN — GADOBUTROL 7 ML: 604.72 INJECTION INTRAVENOUS at 12:06

## 2021-09-21 ASSESSMENT — PAIN SCALES - GENERAL: PAINLEVEL: NO PAIN (0)

## 2021-09-21 ASSESSMENT — MIFFLIN-ST. JEOR: SCORE: 1504.37

## 2021-09-21 NOTE — NURSING NOTE
"Chief Complaint   Patient presents with     RECHECK     Patient is here for Medulloblastoma follow up       /86 (BP Location: Right arm, Patient Position: Fowlers, Cuff Size: Adult Regular)   Pulse 80   Temp 97.9  F (36.6  C) (Oral)   Resp 18   Ht 1.519 m (4' 11.8\")   Wt 66 kg (145 lb 8.1 oz)   SpO2 99%   BMI 28.60 kg/m      Peds Outpatient BP  1) Rested for 5 minutes, BP taken on bare arm, patient sitting (or supine for infants) w/ legs uncrossed?   Yes  2) Right arm used?  Right arm   Yes  3) Arm circumference of largest part of upper arm (in cm): 30  4) BP cuff sized used: Adult (25-32cm)   If used different size cuff then what was recommended why? N/A  5) First BP reading:machine   BP Readings from Last 1 Encounters:   09/21/21 131/86      Is reading >90%?No   (90% for <1 years is 90/50)  (90% for >18 years is 140/90)  *If a machine BP is at or above 90% take manual BP  6) Manual BP reading: N/A  7) Other comments: None    I have reviewed the patient's allergy and medication lists.      Moni Ibrahim, EMT  September 21, 2021  "
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
## 2021-09-22 ENCOUNTER — VIRTUAL VISIT (OUTPATIENT)
Dept: ENDOCRINOLOGY | Facility: CLINIC | Age: 21
End: 2021-09-22
Payer: COMMERCIAL

## 2021-09-22 DIAGNOSIS — E23.0 HYPOPITUITARISM (H): Primary | ICD-10-CM

## 2021-09-22 DIAGNOSIS — E29.1 SECONDARY MALE HYPOGONADISM: ICD-10-CM

## 2021-09-22 PROCEDURE — 99214 OFFICE O/P EST MOD 30 MIN: CPT | Mod: 95 | Performed by: INTERNAL MEDICINE

## 2021-09-22 NOTE — LETTER
9/22/2021       RE: David Beaulieu  [address]  [place] [address]     Dear Colleague,    Thank you for referring your patient, David Beaulieu, to the [facility]OCRINOLOG[facility] [place] at [facility]. Please see a copy of my visit note below.    David is a 20 year old who is being evaluated via a billable video visit.      How would you like to obtain your AVS? MyChart  If the video visit is dropped, the invitation should be resent by: Text to cell phone: [phone]  Will anyone else be joining your video visit? No      Shani Valverde, HAROON      Video-Visit Details    Type of service:  Video Visit    Video Start Time: 6:50 pm  End 7:10 pm    Originating Location (pt. Location): Home    Distant Location (provider location):  [facility] ENDOCRINOLOGY     Platform used for Video Visit: [employer]                                                                         - Endocrinology Follow up -    Reason for visit/consult: Gross hormone deficiency,  secondary hypothyroidism.    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  Denied low energy or muscle weakness. Also no significant gonadal atrophy. But he has significant thin hair. He totally forgot about gel. he has low bone dentisy in Z score    - Slight increase testosterone injection from 100 mg to 150 mcg once a week     - total testosterone inh 3 weeks      # Low bone density  Last bone density in 2017, Z score spine -2.2.    - repeat bone density prior to next visit    - calcium citrate 2 tabs (elemental Ca 630 mg, vitamin D 500IU)       #Growth hormone deficiency    # hypopituitarism  Bone age match with his actual age in 2015, since then discontinued growth hormone injection and  patient has been doing well. Afternoon fatigue+  - IGF1 2019 normal 147 in 2020    - IGF1 level in 3 week  - am cortisol in 3 weeks        #Secondary hypothyroidism  TSH slight elevated, subclinical hypothryoidism (TSH 4.59) but clinically he is doing great. I talked about medication compliance and this year no change dose of levothyroxine.     -Continue current levothyroxine 125 mcg    - RTC with me in 1 year        Total 35  minutes spent on the date of the encounter doing chart review, history and exam, documentation and further activities as noted above.    Karen Caruso MD  Staff Physician  Endocrinology and Metabolism  [facility]  License: [id number]  Pager: [phone]    Interval History as of 9/22/2021 : Patient has been doing well.  Medication compliance very good . New event includes: busy at work, long hour, but also daily afternoon fatigue, shaves face 1-2 times a week  .  Interval History as of 9/14/2020 : Patient has been doing well. Last seen 1 year ago. Medication compliance: totally forgot about testosterone gel   . New event includes: able to work but sometimes tired.   .  Interval History as of 9/17/2019 : Patient has been doing well. Last seen 1 year ago. Medication compliance good. New event includes: non significant . Eating wel, stable body weight, good actvity level.   HPI: A 17-year-old male here for transition care from pediatric endocrinologist for his long history of growth hormone deficiency, secondary hypogonadism, secondary hypothyroidism.  Patient came with his father. Last seen by Dr. Higgins in 9/2015.   Patient was diagnosed medulloblastoma at age 7 with a symptom of a headache and vomiting, he underwent brain surgery followed by chemotherapy and radiation therapy.  Patient developed gross hormone deficiency which was confirmed by biochemical test, was on growth hormone injection since end of 3/2010. Growth hormone stimulation testing 03/17/2010 showed a peak  "response following clonidine of 3 and arginine of 3.4 consistent with severe growth hormone deficiency. He was started on growth hormone therapy at the end of 03/2010. GH was discontinued in 4/2015. He did not have symptoms such as low energy, myalgia, arthralgia after discontinuing growth hormone injection.  He also has secondary hypothyroidism which he is currently on levothyroxine 125 mcg with good compliance.     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   Energy level: ok    Past Medical/Surgical History:  Past Medical History:   Diagnosis Date     Cancer (H)      No past surgical history on file.    Allergies:  Allergies   Allergen Reactions     Amoxicillin Hives     SUSR       Current Medications   Current Outpatient Medications   Medication     Acetaminophen (TYLENOL CHILDRENS PO)     cetirizine (ZYRTEC) 10 MG tablet     famotidine (PEPCID) 20 MG tablet     GNP ARTHRITIS PAIN 1 % topical gel     ibuprofen (ADVIL,MOTRIN) 200 MG tablet     levothyroxine (SYNTHROID/LEVOTHROID) 112 MCG tablet     naproxen (NAPROSYN) 500 MG tablet     syringe/needle, disp, 23G X 1\" 3 ML MISC     Syringe/Needle, Disp, 23G X 1-1/2\" 3 ML MISC     testosterone cypionate (DEPOTESTOSTERONE) 200 MG/ML injection     calcium citrate-vitamin D (CALCIUM CITRATE + D) 315-250 MG-UNIT TABS per tablet     fluticasone (FLONASE) 50 MCG/ACT nasal spray     testosterone cypionate (DEPOTESTOSTERONE) 200 MG/ML injection     No current facility-administered medications for this visit.       Family History:  Family History   Problem Relation Age of Onset     Cerebrovascular Disease Mother      Childhood Heart Surgery Father         PDA s/p repair     Hypertension Father    maternal aunt: ovarian cancer, maternal grandmother: lung cancer, maternal grandfather: prostate cancer    Social History:  Social History     Tobacco Use     Smoking status: Never Smoker     Smokeless tobacco: Never " Used   Substance Use Topics     Alcohol use: Not on file   lives college campus, studying to become .     ROS:  Full review of systems taken with the help of the intake sheet. Otherwise a complete 14 point review of systems was taken and is negative unless stated in the history above.      Physical Exam:   There were no vitals taken for this visit.    General: well appearing, no acute distress, pleasant and conversant,   Mental Status/neuro: alert and oriented  Skull: microcephalia  Face: symmetrical, normal facial color  Eyes: anicteric, no proptosis or lid lag  Resp: no acute distress        Labs : I reviewed data from epic and extract and summarize the pertinent data here.      Ref. Range 9/30/2015 12:20 9/20/2016 11:52 9/19/2017 12:09 9/18/2018 15:46 9/17/2019 13:45   Testosterone Total Latest Ref Range: 300 - 1,200 ng/dL 308 295 (L) 349     TSH Latest Ref Range: 0.40 - 4.00 mU/L <0.01 (L) 0.02 (L) 0.06 (L) 5.38 (H) 4.59 (H)   T4 Free Latest Ref Range: 0.76 - 1.46 ng/dL 1.60 (H) 1.46 1.37 1.04 1.14   Ins Growth Factor 1 Latest Ref Range: 137 - 428 ng/ml    232    Lutropin Latest Ref Range: 1.5 - 9.3 IU/L 3.5 2.6 5.9     FSH Latest Ref Range: 0.7 - 10.8 IU/L 9.3 7.9 7.8         MRI Brain: I personally reviewed the original images and agree with the below reports.   Results for orders placed during the hospital encounter of 09/19/17   MRI Brain w & w/o contrast with susceptibility weighted imaging    Narrative  MR BRAIN W/O & W CONTRAST 9/19/2017 11:36 AM    History: Medulloblastoma diagnosed on 02/07/2008 at 7 years of age.  ?Initial surgical resection of his tumor with suboccipital craniotomy  and near total tumor resection on 02/07/2008.  ??  Patient was treated with subsequent chemo therapy and radiation  therapy.    ??  Comparison: Head MRI 9/20/2016, 9/30/2015 and 2/8/2008    Technique: Multiplanar T1-weighted, axial FLAIR, and susceptibility  images were obtained without intravenous contrast.  Following  intravenous gadolinium-based contrast administration, axial  T2-weighted, diffusion, and T1-weighted images (in multiple planes)  were obtained.    Contrast dose: 6ml iv gadavist    Findings: Postsurgical changes of suboccipital craniotomy and  resection of fourth ventricle medulloblastoma. Hemosiderin staining  within the surgical cavity. Unchanged appearance of the resection  cavity with accompanying atrophic changes of the cerebellar vermis and  right cerebellar hemisphere likely secondary to a combination of  radiotherapy and surgery. On postcontrast series there is no enhancing  lesion to suggest residual lesion or recurrence. No abnormal  restricted diffusion within the surgical site to suggest tumor  recurrence. On susceptibility weighted imaging, there are stable  scattered punctate foci of susceptibility artifact throughout the  subcortical and periventricular white matter. These may represent  chronic microhemorrhages versus post radiation cavernomas.    Compared to previous MRI exams, there is no intracranial hemorrhage  mass effect or new enhancing lesion. Ventricular system appears stable  in size. Patent major flow voids. Venous sinuses appear normal.  Orbital structures are grossly unremarkable.    Again noted subtle increased T1 signal on noncontrast T1-weighted  images within the left dentate nucleus which may represent  accumulation of gadolinium over the years.      Impression Impression:  1. Postsurgical changes of posterior fossa medulloblastoma resection  without any evidence of tumor recurrence.    2. Unchanged scattered foci of susceptibility artifact consistent with  either chronic microhemorrhages versus postradiation cavernomas.    3. Unchanged T1 signal in the basal ganglia and left dentate nucleus  which may represent accumulated gadolinium.    I have personally reviewed the examination and initial interpretation  and I agree with the findings.    FREDERICK SOTO MD

## 2021-09-22 NOTE — PROGRESS NOTES
David is a 20 year old who is being evaluated via a billable video visit.      How would you like to obtain your AVS? MyChart  If the video visit is dropped, the invitation should be resent by: Text to cell phone: [phone]  Will anyone else be joining your video visit? HAROON Gomes      Video-Visit Details    Type of service:  Video Visit    Video Start Time: 6:50 pm  End 7:10 pm    Originating Location (pt. Location): Home    Distant Location (provider location):  [facility] ENDOCRIN[facility]     Platform used for Video Visit: [facility]                                                                         - Endocrinology Follow up -    Reason for visit/consult: Gross hormone deficiency,  secondary hypothyroidism.    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  Denied low energy or muscle weakness. Also no significant gonadal atrophy. But he has significant thin hair. He totally forgot about gel. he has low bone dentisy in Z score    - Slight increase testosterone injection from 100 mg to 150 mcg once a week     - total testosterone inh 3 weeks      # Low bone density  Last bone density in 2017, Z score spine -2.2.    - repeat bone density prior to next visit    - calcium citrate 2 tabs (elemental Ca 630 mg, vitamin D 500IU)       #Growth hormone deficiency    # hypopituitarism  Bone age match with his actual age in 2015, since then discontinued growth hormone injection and patient has been doing well. Afternoon fatigue+  - IGF1 2019 normal 147 in 2020    - IGF1 level in 3 week  - am cortisol in 3 weeks        #Secondary hypothyroidism  TSH slight elevated, subclinical hypothryoidism (TSH 4.59) but clinically he is doing great. I talked about medication compliance and this year no  change dose of levothyroxine.     -Continue current levothyroxine 125 mcg    - RTC with me in 1 year        Total 35  minutes spent on the date of the encounter doing chart review, history and exam, documentation and further activities as noted above.    Karen Caruso MD  Staff Physician  Endocrinology and Metabolism  [facility]  License: [id number]  Pager: [phone]    Interval History as of 9/22/2021 : Patient has been doing well.  Medication compliance very good . New event includes: busy at work, long hour, but also daily afternoon fatigue, shaves face 1-2 times a week  .  Interval History as of 9/14/2020 : Patient has been doing well. Last seen 1 year ago. Medication compliance: totally forgot about testosterone gel   . New event includes: able to work but sometimes tired.   .  Interval History as of 9/17/2019 : Patient has been doing well. Last seen 1 year ago. Medication compliance good. New event includes: non significant . Eating wel, stable body weight, good actvity level.   HPI: A 17-year-old male here for transition care from pediatric endocrinologist for his long history of growth hormone deficiency, secondary hypogonadism, secondary hypothyroidism.  Patient came with his father. Last seen by Dr. Higgins in 9/2015.   Patient was diagnosed medulloblastoma at age 7 with a symptom of a headache and vomiting, he underwent brain surgery followed by chemotherapy and radiation therapy.  Patient developed gross hormone deficiency which was confirmed by biochemical test, was on growth hormone injection since end of 3/2010. Growth hormone stimulation testing 03/17/2010 showed a peak response following clonidine of 3 and arginine of 3.4 consistent with severe growth hormone deficiency. He was started on growth hormone therapy at the end of 03/2010. GH was discontinued in 4/2015. He did not have symptoms such as low energy, myalgia, arthralgia after discontinuing growth hormone injection.  He  "also has secondary hypothyroidism which he is currently on levothyroxine 125 mcg with good compliance.     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   Energy level: ok    Past Medical/Surgical History:  Past Medical History:   Diagnosis Date     Cancer (H)      No past surgical history on file.    Allergies:  Allergies   Allergen Reactions     Amoxicillin Hives     SUSR       Current Medications   Current Outpatient Medications   Medication     Acetaminophen (TYLENOL CHILDRENS PO)     cetirizine (ZYRTEC) 10 MG tablet     famotidine (PEPCID) 20 MG tablet     GNP ARTHRITIS PAIN 1 % topical gel     ibuprofen (ADVIL,MOTRIN) 200 MG tablet     levothyroxine (SYNTHROID/LEVOTHROID) 112 MCG tablet     naproxen (NAPROSYN) 500 MG tablet     syringe/needle, disp, 23G X 1\" 3 ML MISC     Syringe/Needle, Disp, 23G X 1-1/2\" 3 ML MISC     testosterone cypionate (DEPOTESTOSTERONE) 200 MG/ML injection     calcium citrate-vitamin D (CALCIUM CITRATE + D) 315-250 MG-UNIT TABS per tablet     fluticasone (FLONASE) 50 MCG/ACT nasal spray     testosterone cypionate (DEPOTESTOSTERONE) 200 MG/ML injection     No current facility-administered medications for this visit.       Family History:  Family History   Problem Relation Age of Onset     Cerebrovascular Disease Mother      Childhood Heart Surgery Father         PDA s/p repair     Hypertension Father    maternal aunt: ovarian cancer, maternal grandmother: lung cancer, maternal grandfather: prostate cancer    Social History:  Social History     Tobacco Use     Smoking status: Never Smoker     Smokeless tobacco: Never Used   Substance Use Topics     Alcohol use: Not on file   lives college campus, studying to become .     ROS:  Full review of systems taken with the help of the intake sheet. Otherwise a complete 14 point review of systems was taken and is negative unless stated in the history above.      Physical " Exam:   There were no vitals taken for this visit.    General: well appearing, no acute distress, pleasant and conversant,   Mental Status/neuro: alert and oriented  Skull: microcephalia  Face: symmetrical, normal facial color  Eyes: anicteric, no proptosis or lid lag  Resp: no acute distress        Labs : I reviewed data from epic and extract and summarize the pertinent data here.      Ref. Range 9/30/2015 12:20 9/20/2016 11:52 9/19/2017 12:09 9/18/2018 15:46 9/17/2019 13:45   Testosterone Total Latest Ref Range: 300 - 1,200 ng/dL 308 295 (L) 349     TSH Latest Ref Range: 0.40 - 4.00 mU/L <0.01 (L) 0.02 (L) 0.06 (L) 5.38 (H) 4.59 (H)   T4 Free Latest Ref Range: 0.76 - 1.46 ng/dL 1.60 (H) 1.46 1.37 1.04 1.14   Ins Growth Factor 1 Latest Ref Range: 137 - 428 ng/ml    232    Lutropin Latest Ref Range: 1.5 - 9.3 IU/L 3.5 2.6 5.9     FSH Latest Ref Range: 0.7 - 10.8 IU/L 9.3 7.9 7.8         MRI Brain: I personally reviewed the original images and agree with the below reports.   Results for orders placed during the hospital encounter of 09/19/17   MRI Brain w & w/o contrast with susceptibility weighted imaging    Narrative  MR BRAIN W/O & W CONTRAST 9/19/2017 11:36 AM    History: Medulloblastoma diagnosed on 02/07/2008 at 7 years of age.  ?Initial surgical resection of his tumor with suboccipital craniotomy  and near total tumor resection on 02/07/2008.  ??  Patient was treated with subsequent chemo therapy and radiation  therapy.    ??  Comparison: Head MRI 9/20/2016, 9/30/2015 and 2/8/2008    Technique: Multiplanar T1-weighted, axial FLAIR, and susceptibility  images were obtained without intravenous contrast. Following  intravenous gadolinium-based contrast administration, axial  T2-weighted, diffusion, and T1-weighted images (in multiple planes)  were obtained.    Contrast dose: 6ml iv gadavist    Findings: Postsurgical changes of suboccipital craniotomy and  resection of fourth ventricle medulloblastoma. Hemosiderin  staining  within the surgical cavity. Unchanged appearance of the resection  cavity with accompanying atrophic changes of the cerebellar vermis and  right cerebellar hemisphere likely secondary to a combination of  radiotherapy and surgery. On postcontrast series there is no enhancing  lesion to suggest residual lesion or recurrence. No abnormal  restricted diffusion within the surgical site to suggest tumor  recurrence. On susceptibility weighted imaging, there are stable  scattered punctate foci of susceptibility artifact throughout the  subcortical and periventricular white matter. These may represent  chronic microhemorrhages versus post radiation cavernomas.    Compared to previous MRI exams, there is no intracranial hemorrhage  mass effect or new enhancing lesion. Ventricular system appears stable  in size. Patent major flow voids. Venous sinuses appear normal.  Orbital structures are grossly unremarkable.    Again noted subtle increased T1 signal on noncontrast T1-weighted  images within the left dentate nucleus which may represent  accumulation of gadolinium over the years.      Impression Impression:  1. Postsurgical changes of posterior fossa medulloblastoma resection  without any evidence of tumor recurrence.    2. Unchanged scattered foci of susceptibility artifact consistent with  either chronic microhemorrhages versus postradiation cavernomas.    3. Unchanged T1 signal in the basal ganglia and left dentate nucleus  which may represent accumulated gadolinium.    I have personally reviewed the examination and initial interpretation  and I agree with the findings.    FREDERICK SOTO MD

## 2021-09-23 LAB — TESTOST SERPL-MCNC: 199 NG/DL (ref 240–950)

## 2021-09-23 RX ORDER — TESTOSTERONE CYPIONATE 200 MG/ML
150 INJECTION, SOLUTION INTRAMUSCULAR WEEKLY
Qty: 10 ML | Refills: 5 | Status: SHIPPED | OUTPATIENT
Start: 2021-09-23 | End: 2023-10-04

## 2021-09-24 DIAGNOSIS — E29.1 HYPOGONADISM MALE: ICD-10-CM

## 2021-09-24 LAB — IGF-I BLD-MCNC: 126 NG/ML (ref 120–338)
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
## 2021-09-25 ENCOUNTER — HEALTH MAINTENANCE LETTER (OUTPATIENT)
Age: 21
End: 2021-09-25

## 2021-09-25 RX ORDER — NEEDLES, SAFETY 22GX1 1/2"
NEEDLE, DISPOSABLE MISCELLANEOUS
Qty: 16 EACH | Refills: 3 | Status: SHIPPED | OUTPATIENT
Start: 2021-09-25 | End: 2022-10-26

## 2021-09-25 NOTE — TELEPHONE ENCOUNTER
Syringe/needle    9/22/2021  [facility] [place]   Karen Caruso MD    Endocrinology, Diabetes, and Metabolism

## 2021-10-12 DIAGNOSIS — E03.8 SECONDARY HYPOTHYROIDISM: ICD-10-CM

## 2021-10-15 RX ORDER — LEVOTHYROXINE SODIUM 112 UG/1
112 TABLET ORAL DAILY
Qty: 90 TABLET | Refills: 3 | Status: SHIPPED | OUTPATIENT
Start: 2021-10-15 | End: 2022-10-22

## 2021-10-15 NOTE — TELEPHONE ENCOUNTER
9/22/2021  [facility] [place]     Karen Caruso MD    Endocrinology, Diabetes, and Metabolism    levothyroxine (SYNTHROID/LEVOTHROID) 112 MCG tablet

## 2022-01-15 ENCOUNTER — HEALTH MAINTENANCE LETTER (OUTPATIENT)
Age: 22
End: 2022-01-15

## 2022-02-18 DIAGNOSIS — K21.9 GASTROESOPHAGEAL REFLUX DISEASE WITHOUT ESOPHAGITIS: ICD-10-CM

## 2022-02-21 RX ORDER — FAMOTIDINE 20 MG/1
20 TABLET, FILM COATED ORAL 2 TIMES DAILY PRN
Qty: 60 TABLET | Refills: 0 | Status: SHIPPED | OUTPATIENT
Start: 2022-02-21 | End: 2022-03-11

## 2022-03-11 DIAGNOSIS — K21.9 GASTROESOPHAGEAL REFLUX DISEASE WITHOUT ESOPHAGITIS: ICD-10-CM

## 2022-03-11 RX ORDER — FAMOTIDINE 20 MG/1
20 TABLET, FILM COATED ORAL 2 TIMES DAILY PRN
Qty: 60 TABLET | Refills: 0 | Status: SHIPPED | OUTPATIENT
Start: 2022-03-11 | End: 2022-05-02

## 2022-04-03 ENCOUNTER — MYC MEDICAL ADVICE (OUTPATIENT)
Dept: ENDOCRINOLOGY | Facility: CLINIC | Age: 22
End: 2022-04-03
Payer: COMMERCIAL

## 2022-04-03 DIAGNOSIS — E23.0 HYPOPITUITARISM (H): Primary | ICD-10-CM

## 2022-04-04 NOTE — TELEPHONE ENCOUNTER
Testosterone Cypionate Intramuscular Solution 200 MG/ML      Last Written Prescription Date:  9-  Last Fill Quantity: 10,   # refills: 5  Last Office Visit : 9-  Future Office visit:  9-    Routing refill request to provider for review/approval because:  CONTROLLED MEDICATION      Kathleen M Doege RN

## 2022-04-05 RX ORDER — TESTOSTERONE CYPIONATE 200 MG/ML
INJECTION, SOLUTION INTRAMUSCULAR
Qty: 10 ML | Refills: 0 | Status: SHIPPED | OUTPATIENT
Start: 2022-04-05 | End: 2022-07-11

## 2022-04-29 DIAGNOSIS — E23.0 HYPOPITUITARISM (H): Primary | ICD-10-CM

## 2022-05-01 DIAGNOSIS — K21.9 GASTROESOPHAGEAL REFLUX DISEASE WITHOUT ESOPHAGITIS: ICD-10-CM

## 2022-05-02 RX ORDER — FAMOTIDINE 20 MG/1
20 TABLET, FILM COATED ORAL 2 TIMES DAILY PRN
Qty: 60 TABLET | Refills: 0 | Status: SHIPPED | OUTPATIENT
Start: 2022-05-02

## 2022-07-09 DIAGNOSIS — E23.0 HYPOPITUITARISM (H): ICD-10-CM

## 2022-07-11 RX ORDER — TESTOSTERONE CYPIONATE 200 MG/ML
INJECTION, SOLUTION INTRAMUSCULAR
Qty: 10 ML | Refills: 0 | Status: SHIPPED | OUTPATIENT
Start: 2022-07-11 | End: 2022-10-26

## 2022-09-20 ENCOUNTER — HOSPITAL ENCOUNTER (OUTPATIENT)
Dept: CARDIOLOGY | Facility: CLINIC | Age: 22
Discharge: HOME | End: 2022-09-20
Attending: NURSE PRACTITIONER
Payer: COMMERCIAL

## 2022-09-20 ENCOUNTER — OFFICE VISIT (OUTPATIENT)
Dept: PEDIATRIC HEMATOLOGY/ONCOLOGY | Facility: CLINIC | Age: 22
End: 2022-09-20
Attending: NURSE PRACTITIONER
Payer: COMMERCIAL

## 2022-09-20 VITALS
TEMPERATURE: 97.5 F | OXYGEN SATURATION: 99 % | RESPIRATION RATE: 18 BRPM | SYSTOLIC BLOOD PRESSURE: 131 MMHG | BODY MASS INDEX: 26.49 KG/M2 | WEIGHT: 134.92 LBS | HEIGHT: 60 IN | HEART RATE: 86 BPM | DIASTOLIC BLOOD PRESSURE: 79 MMHG

## 2022-09-20 DIAGNOSIS — E03.8 SECONDARY HYPOTHYROIDISM: ICD-10-CM

## 2022-09-20 DIAGNOSIS — C71.6 MEDULLOBLASTOMA OF CEREBELLUM (H): Primary | ICD-10-CM

## 2022-09-20 DIAGNOSIS — Z92.21 STATUS POST CHEMOTHERAPY: ICD-10-CM

## 2022-09-20 DIAGNOSIS — E29.1 SECONDARY MALE HYPOGONADISM: ICD-10-CM

## 2022-09-20 DIAGNOSIS — Z91.89 AT RISK FOR CARDIOMYOPATHY: ICD-10-CM

## 2022-09-20 DIAGNOSIS — Z92.3 S/P RADIATION THERAPY: ICD-10-CM

## 2022-09-20 DIAGNOSIS — Z23 FLU VACCINE NEED: ICD-10-CM

## 2022-09-20 DIAGNOSIS — Z23 NEED FOR COVID-19 VACCINE: ICD-10-CM

## 2022-09-20 DIAGNOSIS — C71.6 MEDULLOBLASTOMA OF CEREBELLUM (H): ICD-10-CM

## 2022-09-20 LAB
ANION GAP SERPL CALCULATED.3IONS-SCNC: 4 MMOL/L (ref 3–14)
BUN SERPL-MCNC: 10 MG/DL (ref 7–30)
CALCIUM SERPL-MCNC: 9.2 MG/DL (ref 8.5–10.1)
CHLORIDE BLD-SCNC: 102 MMOL/L (ref 94–109)
CO2 SERPL-SCNC: 32 MMOL/L (ref 20–32)
CREAT SERPL-MCNC: 0.82 MG/DL (ref 0.66–1.25)
GFR SERPL CREATININE-BSD FRML MDRD: >90 ML/MIN/1.73M2
GLUCOSE BLD-MCNC: 70 MG/DL (ref 70–99)
POTASSIUM BLD-SCNC: 3.7 MMOL/L (ref 3.4–5.3)
SODIUM SERPL-SCNC: 138 MMOL/L (ref 133–144)
T4 FREE SERPL-MCNC: 1.28 NG/DL (ref 0.76–1.46)
TSH SERPL DL<=0.005 MIU/L-ACNC: 1.39 MU/L (ref 0.4–4)

## 2022-09-20 PROCEDURE — 93306 TTE W/DOPPLER COMPLETE: CPT

## 2022-09-20 PROCEDURE — 80048 BASIC METABOLIC PNL TOTAL CA: CPT | Performed by: NURSE PRACTITIONER

## 2022-09-20 PROCEDURE — G0463 HOSPITAL OUTPT CLINIC VISIT: HCPCS

## 2022-09-20 PROCEDURE — 36415 COLL VENOUS BLD VENIPUNCTURE: CPT | Performed by: NURSE PRACTITIONER

## 2022-09-20 PROCEDURE — 93306 TTE W/DOPPLER COMPLETE: CPT | Mod: 26 | Performed by: PEDIATRICS

## 2022-09-20 PROCEDURE — 99215 OFFICE O/P EST HI 40 MIN: CPT | Performed by: NURSE PRACTITIONER

## 2022-09-20 PROCEDURE — 84403 ASSAY OF TOTAL TESTOSTERONE: CPT | Performed by: NURSE PRACTITIONER

## 2022-09-20 PROCEDURE — G0463 HOSPITAL OUTPT CLINIC VISIT: HCPCS | Mod: 25

## 2022-09-20 PROCEDURE — 84443 ASSAY THYROID STIM HORMONE: CPT | Performed by: NURSE PRACTITIONER

## 2022-09-20 PROCEDURE — 84439 ASSAY OF FREE THYROXINE: CPT | Performed by: NURSE PRACTITIONER

## 2022-09-20 ASSESSMENT — PAIN SCALES - GENERAL: PAINLEVEL: NO PAIN (0)

## 2022-09-20 NOTE — NURSING NOTE
"Chief Complaint   Patient presents with     RECHECK     Pt here for medulloblastoma LTFU and scan results     /79 (BP Location: Right arm, Patient Position: Sitting, Cuff Size: Adult Regular)   Pulse 86   Temp 97.5  F (36.4  C) (Oral)   Resp 18   Ht 1.521 m (4' 11.88\")   Wt 61.2 kg (134 lb 14.7 oz)   SpO2 99%   BMI 26.45 kg/m      No Pain (0)  Data Unavailable    I have reviewed the patients medications and allergies    Height/weight double check needed? No    Peds Outpatient BP  1) Rested for 5 minutes, BP taken on bare arm, patient sitting (or supine for infants) w/ legs uncrossed?   Yes  2) Right arm used?  Right arm   Yes  3) Arm circumference of largest part of upper arm (in cm): 27cm  4) BP cuff sized used: Adult (25-32cm)   If used different size cuff then what was recommended why? N/A  5) First BP reading:machine   BP Readings from Last 1 Encounters:   09/20/22 131/79      Is reading >90%?No   (90% for <1 years is 90/50)  (90% for >18 years is 140/90)  *If a machine BP is at or above 90% take manual BP  6) Manual BP reading: N/A  7) Other comments: None          Juventino Beck, EMT  September 20, 2022  "

## 2022-09-20 NOTE — LETTER
9/20/2022      RE: David Beaulieu  [address]  [place] [address]     Dear Colleague,    Thank you for the opportunity to participate in the care of your patient, David Beaulieu, at the [facility] at [facility]. Please see a copy of my visit note below.           We had the pleasure of seeing your patient, David Beaulieu, at the [facility] Childhood Cancer Survivorship [facility].  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  His tumor was located in his posterior fossa.  He was treated at the [facility] under the direction of Dr. Grant Rosenbaum.  David had initial surgical resection of his tumor which was as follows:   1.  Suboccipital craniotomy and near total tumor resection on 02/07/2008 with Dr. Wilber Valenzuela at the [facility].        David was treated as per the COG protocol [id number] and received the following chemotherapy:   1.  Vincristine intravenously.   2.  Cis-retinoic acid orally.   3.  Cisplatin intravenously with cumulative dose of 394 mg/meter squared.   4.  Cyclophosphamide intravenously with a cumulative dose of 12 grams/meter squared.      David also received radiation therapy as part of his treatment which is as follows:   1.  Whole brain radiation which started on 03/04/2008 and was completed on 03/31/2008 in 20 fractions, for a total dose of 3600 centigray.   2.  Spine radiation which started on 03/04/2008 was completed on 03/31/2008 in 20 fractions, for a total dose of 3600 centigray.   3.  Posterior fossa boost, which started on 04/01/2008 and was completed on 04/15/2008 in 11 fractions, for a total of 1972 centigray.   Total dose to posterior fossa is 5572 centigray.      David's acute and chronic late effects known to date include:   1.   "Severe bilateral sensorineural hearing loss diagnosed on 08/21/2008- wears hearing aids.   2.  Growth deceleration found on 09/22/2009.   3.  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   4.  Lower extremity weakness diagnosed on 11/03/2009, for which he started physical therapy.   5.  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   6.  Problems with executive functioning diagnosed on 10/25/2010.   7.  Slow processing speed diagnosed on 10/25/2010.   8.  Problems with fine motor skills diagnosed on 10/25/2010.   9.  Reduced spinal growth diagnosed on 07/24/2012.   10.  Problems with sustained attention diagnosed on 08/17/2012.   11.  Problems with emotional lability.   12.  Depression which was diagnosed on 12/03/2014.   13.  Borderline EF decreased to 52%- 9/2015; improved to 58% in 2021.  14. Dental caries       HISTORY OF PRESENT ILLNESS:  David reports to his long term follow up evaluation alone.  He has been doing well.  He continues to live in the [facility] on his own.  His father lives in [place].  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 He is seeing Dr. Caruso over video this week. Started testosterone injections weekly.  Has been doing them himself and it is going well.  No current cardiovascular concerns.  He had another echo last year that was improved.   He had local PFTs in 2018 that were at the lower limits of normal.  He saw local pulmonary and there were no concerns.   He is not longer taking Concerta for attention.  He feels like he is doing ok off of this medication.     He did disclose in 2019 that he is stopping going to college.  He says that he isn't \"book smart\" and \"school isn't for me.\"  He said that he was using his accommodations but he decided that he would rather work and he may not need a degree to do the job that he wants to do.   He last had neuropsych testing done in 12/2014.  David denies any headaches, " "vision changes.  He went for an eye doctor visit last year and due for yearly follow up.   He denies any seizure-like activity or tremors.  No back pain, no fever.   With regards to his hearing, he has local hearing testing and got new aids last year.  He had his hearing tested this year.    Sleep and appetite good.  No skin changes.  He did have 2 cavities at the dentist last visit.  Those were filled.  He is due for another dental visit and hasn't been in a while.  He feels like his 2 upper molars are chipping.  He said that he has trouble getting dentists in his area that take his insurance.  Has been working ~40 hrs/wk at [employer]. He would like to start having some of his follow up with oncology over video if possible given the distance.     REVIEW OF SYSTEMS:  Comprehensive review of systems was negative except as started above.      CURRENT MEDICATIONS:   Current Outpatient Medications   Medication     Acetaminophen (TYLENOL CHILDRENS PO)     cetirizine (ZYRTEC) 10 MG tablet     fluticasone (FLONASE) 50 MCG/ACT nasal spray     ibuprofen (ADVIL,MOTRIN) 200 MG tablet     levothyroxine (SYNTHROID/LEVOTHROID) 112 MCG tablet     syringe/needle, disp, (B-D SYRINGE/NEEDLE) 23G X 1\" 3 ML MISC     testosterone cypionate (DEPOTESTOSTERONE) 200 MG/ML injection     testosterone cypionate (DEPOTESTOSTERONE) 200 MG/ML injection     calcium citrate-vitamin D (CALCIUM CITRATE + D) 315-250 MG-UNIT TABS per tablet     famotidine (PEPCID) 20 MG tablet     GNP ARTHRITIS PAIN 1 % topical gel     naproxen (NAPROSYN) 500 MG tablet     omeprazole (PRILOSEC) 20 MG DR capsule     Syringe/Needle, Disp, 23G X 1-1/2\" 3 ML MISC     testosterone cypionate (DEPOTESTOSTERONE) 200 MG/ML injection     Current Facility-Administered Medications   Medication     COVID-19 mRNA BIVALENT vaccine (PFIZER BOOSTER) 30 MCG/0.3ML injection 30 mcg     influenza quadrivalent (PF) vacc (FLUZONE) injection 0.5 mL          ALLERGIES:  Amoxicillin.    " "  FAMILY HISTORY:     Family History   Problem Relation Age of Onset     Cerebrovascular Disease Mother      Childhood Heart Surgery Father         PDA s/p repair     Hypertension Father    PGF, PGGF with cataracts.         SOCIAL HISTORY:  David completed his 2nd year of college at [employer] in [place].   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        PHYSICAL EXAMINATION:     VITAL SIGNS:   /79 (BP Location: Right arm, Patient Position: Sitting, Cuff Size: Adult Regular)   Pulse 86   Temp 97.5  F (36.4  C) (Oral)   Resp 18   Ht 1.521 m (4' 11.88\")   Wt 61.2 kg (134 lb 14.7 oz)   SpO2 99%   BMI 26.45 kg/m         Wt Readings from Last 3 Encounters:   09/20/22 61.2 kg (134 lb 14.7 oz)   09/21/21 66 kg (145 lb 8.1 oz)   09/21/20 69.5 kg (153 lb 3.5 oz)     Ht Readings from Last 2 Encounters:   09/20/22 1.521 m (4' 11.88\")   09/21/21 1.519 m (4' 11.8\")     Facility age limit for growth percentiles is 20 years.      GENERAL:  David appears to be in no acute distress.  He is alert and oriented x3 and well-nourished.   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  Bilateral tympanic membranes are within normal limits.  Pupils are equally round and reactive to light and accommodation.  Extraocular movements are intact.  Fundoscopic exam negative for cataracts.  Oropharynx is clear without lesions. Some chipping noted in upper molars.  NECK:  Supple.  Thyroid is nonpalpable.  There is a well-healed occipital surgical scar.   CARDIOVASCULAR:  Regular rate and rhythm with no murmurs, rubs or gallops.  Intact distal pulses.   RESPIRATORY:  Breath sounds are clear to auscultation bilaterally with normal effort and no distress.  No wheezes, crackles or rales were auscultated.   ABDOMEN:  Soft, nondistended, nontender, with bowel sounds in all 4 quadrants, no palpable masses or tenderness.   MUSCULOSKELETAL:  Normal " range of motion. Muscular hypoplasia to paraspinous muscles - stable  LYMPHATIC:  Patient has no lymphadenopathy.   NEUROLOGIC:  Natali is alert and oriented x3, has 2+ lower bilateral reflexes.  He has no cranial nerve deficits and has normal muscle tone.  GCS score is 15. .  Gait WNL.    SKIN:  Skin is warm and dry with no rashes or erythema.   PSYCHIATRIC:  Mood and affect are within normal limits.      LABORATORY STUDIES:   Results for orders placed or performed during the hospital encounter of 22   Echo Pediatric (TTE) Complete     Status: None    Narrative    [id number]  Formerly [facility], [facility]  [id number]  [id number]^CORINA^CORBIN^ASH                                                               Study ID: [id number]                                                 [place] [address]                                                Phone: [phone]                                Pediatric Echocardiogram  ______________________________________________________________________________  Name: NATALI MALAVE  Study Date: 2022 12:51 PM             Patient Location: [facility]  MRN: [id number]                             Age: 21 yrs  : 2000  Gender: Male  Patient Class: Outpatient                   Height: 152 cm  Ordering Provider: CORBIN ARRIAGA              Weight: 66 kg  Referring Provider: CORBIN ARRIAGA             BSA: 1.6 m2  Performed By: Gene Pritchett  Report approved by: Dominic Gordon MD  Reason For Study: Evaluate LVEF  ______________________________________________________________________________  ##### CONCLUSIONS #####  Normal echocardiogram. There is normal appearance and motion of the tricuspid,  mitral, pulmonary and aortic valves. No atrial, ventricular or arterial level  shunting. Estimated right  ventricular systolic pressure is 22 mmHg above right  atrial pressure. The left and right ventricles have normal chamber size, wall  thickness, and systolic function. The calculated biplane left ventricular  ejection fraction is 58%. No pericardial effusion.  ______________________________________________________________________________  Technical information:  A complete two dimensional, MMODE, spectral and color Doppler transthoracic  echocardiogram is performed. The study quality is good. Images are obtained  from parasternal, apical, subcostal and suprasternal notch views. Prior  echocardiogram available for comparison. ECG tracing shows regular rhythm.     Segmental Anatomy:  There is normal atrial arrangement, with concordant atrioventricular and  ventriculoarterial connections.     Systemic and pulmonary veins:  The systemic venous return is normal. Normal coronary sinus. Color flow  demonstrates flow from at least one pulmonary vein entering the left atrium.     Atria and atrial septum:  Normal right atrial size. The left atrium is normal in size. There is no  atrial level shunting.     Atrioventricular valves:  The tricuspid valve is normal in appearance and motion. Trivial tricuspid  valve insufficiency. Estimated right ventricular systolic pressure is 22 mmHg  plus right atrial pressure. The mitral valve is normal in appearance and  motion. Trivial mitral valve insufficiency.     Ventricles and Ventricular Septum:  The left and right ventricles have normal chamber size, wall thickness, and  systolic function. The calculated biplane left ventricular ejection fraction  is 58 %. The calculated single plane left ventricular ejection fraction from  the 4 chamber view is 65 %. The calculated single plane left ventricular  ejection fraction from the 2 chamber view is 58 %. There is no ventricular  level shunting.     Outflow tracts:  Normal great artery relationship. There is unobstructed flow through the  right  ventricular outflow tract. The pulmonary valve motion is normal. There is  normal flow across the pulmonary valve. Trivial pulmonary valve insufficiency.  There is unobstructed flow through the left ventricular outflow tract.  Tricuspid aortic valve with normal appearance and motion. There is normal flow  across the aortic valve.     Great arteries:  The main pulmonary artery has normal appearance. There is unobstructed flow in  the main pulmonary artery. The pulmonary artery bifurcation is normal. There  is unobstructed flow in both branch pulmonary arteries. Normal ascending  aorta. The aortic arch appears normal. There is unobstructed antegrade flow in  the ascending, transverse arch, descending thoracic and abdominal aorta.     Arterial Shunts:  No obvious arterial level shunting.     Coronaries:  The coronary arteries are not evaluated.     Effusions, catheters, cannulas and leads:  No pericardial effusion.     MMode/2D Measurements & Calculations  LA dimension: 3.1 cm                       Ao root diam: 2.6 cm  LA/Ao: 1.2                                 2 Chamber EF: 50.2 %  4 Chamber EF: 65.1 %                       EF Biplane: 58.4 %  LVMI(BSA): 72.6 grams/m2                   LVMI(Height): 39.7     RWT(MM): 0.43     Doppler Measurements & Calculations  Ao V2 max: 100.2 cm/sec                  LV V1 max: 95.1 cm/sec  Ao max P.0 mmHg                      LV V1 max PG: 3.6 mmHg  PA V2 max: 113.1 cm/sec                  RV V1 max: 93.6 cm/sec  PA max P.1 mmHg                      RV V1 max PG: 3.5 mmHg  TR max trevon: 235.8 cm/sec  TR max P.2 mmHg     asc Ao max trevon: 56.4 cm/sec           desc Ao max trevon: 93.0 cm/sec  asc Ao max P.3 mmHg               desc Ao max PG: 3.5 mmHg  MPA max trevon: 110.6 cm/sec  MPA max P.9 mmHg     [place] 2D Z-SCORE VALUES  Measurement Name Value Z-ScorePredictedNormal Range  Ao sinus diam(2D)3.0 cm1.0    2.7      2.2 - 3.3  asc Aorta(2D)    2.6 cm0.67   2.4       1.9 - 3.0     [place] Z-Scores (Measurements & Calculations)  Measurement NameValue      Z-ScorePredictedNormal Range  IVSd(MM)        0.93 cm    -0.03  0.93     0.65 - 1.21  LVIDd(MM)       4.2 cm     -1.8   4.9      4.2 - 5.6  LVIDs(MM)       2.6 cm     -1.7   3.2      2.5 - 3.8  LVPWd(MM)       0.90 cm    0.23   0.87     0.64 - 1.11  LV mass(C)d(MM) 122.9 grams-1.0   150.3    102.1 - 221.3  FS(MM)          38.6 %     1.00   34.8     28.3 - 42.7     Report approved by: Magdy Cortes 09/20/2022 02:09 PM         Results for orders placed or performed in visit on 09/20/22   TSH     Status: Normal   Result Value Ref Range    TSH 1.39 0.40 - 4.00 mU/L   T4 free     Status: Normal   Result Value Ref Range    Free T4 1.28 0.76 - 1.46 ng/dL   Testosterone total     Status: Abnormal   Result Value Ref Range    Testosterone Total 1,010 (H) 240 - 950 ng/dL   Basic metabolic panel     Status: Normal   Result Value Ref Range    Sodium 138 133 - 144 mmol/L    Potassium 3.7 3.4 - 5.3 mmol/L    Chloride 102 94 - 109 mmol/L    Carbon Dioxide (CO2) 32 20 - 32 mmol/L    Anion Gap 4 3 - 14 mmol/L    Urea Nitrogen 10 7 - 30 mg/dL    Creatinine 0.82 0.66 - 1.25 mg/dL    Calcium 9.2 8.5 - 10.1 mg/dL    Glucose 70 70 - 99 mg/dL    GFR Estimate >90 >60 mL/min/1.73m2       IMAGING: see above    ASSESSMENT AND PLAN:  David Beaulieu is a now 21 year-old  male with a history of medulloblastoma that completed treatment in 04/2009 with cis-retinoic acid.  He has been off therapy for 13 years.  He has some late effects in the area of endocrinopathies as well as neurocognitive likely due to his radiation therapy.  He also has hearing loss from his cisplatin treatment.   Has some dental changes and due to see the dentist.  The following are our long term recommendations:    New late effects:  none     1.  Risk of recurrence:  David is currently 13 years from the end of treatment for his medulloblastoma.  Given the distance from the  diagnosis of his primary malignancy, the likelihood of recurrence continues to decline. Now that he is 10 years off therapy we can extend his MRI to 2 years from now.  Currently we are monitoring his radiation changes.  MRI done last year was stable.   Will plan to repeat the MRI next year.    2.  Psychosocial effects:  David had previous neuropsych testing and was found to have problems with sustained attention, processing speed and some motor skills.  He has accommodations in college but has chosen to take a break from there and work.  If he notices any significant changes in cognition, we would recommend repeat testing.     3.  Risk for cardiac toxicity:  David has a history of spine radiation, which puts him at risk for developing cardiomyopathy and valvular dysfunction.  We recommend he have an echocardiogram every 5 years.  Baseline study in 2015 showed decreased LVEF to 47% in 4 chamber and 52% biplane; also small PFO with L to R shunting.  He saw peds cardiology in 2015 but family would like to follow up locally.  He had a local echocardiogram in 2019 that was completely normal (EF 60%).  Echo repeated today with EF 58%..  We will plan to repeat in 2 years.     There is also increasing evidence regarding metabolic syndrome in cancer survivors.  We would recommend that David have fasting lipids followed at least every 2-3 years.  Last done in 2015 but he wasn't fasting today.  He should try to have it completed with primary care.    4.  Hearing loss:  David has a history of sensorineural hearing loss likely related to his posterior fossa radiation and cisplatin treatment.  He has hearing aids now and should continue his exams locally.  He is due for an exam yearly.    5.  Risk for peripheral neuropathy:  David has a history of vincristine exposure, which does put him at increased risk for peripheral motor neuropathy.  He does have some previous issues with fine motor skills, but these appear to be  improved.     6.  Endocrinopathies:  David has a history of growth hormone deficiency and hypothyroidism, which are both likely from his radiation treatment.  He should have regular followup with Endocrinology as directed.     7.  Risk for secondary neoplasms/vascular changes:  David has a history of radiation, which does put him at increased risk for secondary neoplasms.  I counseled him that should he notice any changes in his cognitive status, any new lumps, bumps, rashes that they should be promptly evaluated given his history of radiation. 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   He will have an MRI done in 24 months to evaluate post his whole brain radiation.  I did discuss with his father that we would stop doing spine examinations at this point and solely do brain MRI.    I did also discuss the risk for stroke post radiation and reviewed symptoms.  Mom does have a history of CVA and dad is unsure if she has any underlying disorder that caused this.  CBC today is normal.    8.  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  He currently has no back pain or concerns.     9.  Risk for kidney toxicity:  David has a history of cisplatin and cyclophosphamide treatment, which can cause kidney and bladder dysfunction.  David's baseline CMP and UA were within normal limits.  We recommend that he have yearly assessments of his blood pressure and urinary history.     10. Risk for cataracts:  David has a history of whole brain radiation so he has a risk for cataracts.  He should have regular eye exams completed.  David will make an appt in the near future.  No cataracts noted on exam today.    11.  David was selected to be a control on [id number].  We reviewed the consent today and answered all questions.  David signed consent and completed all necessary  questionnaires.    12.  Immune:  Vaccine Health: You can now receive any vaccines that your primary care provider (PCP) recommends, there are no restrictions. Please confirm with your PCP that your vaccines are up to date, including for HPV. As a cancer/BMT survivor, the HPV vaccination should be a 3-dose series. We spoke about the vaccine today as we strongly recommend it (if eligible). Was going to have COVID booster and flu vaccine today but had to leave with out getting them due to an appt following his visit.  Will plan to get them locally.         It was a pleasure seeing David in our Cancer Survivorship Program.  We appreciate the opportunity to participate in your patient's care.  If you have any questions or concerns, please do not hesitate to contact us at [phone].   We ask that David return in 12 months for an exam/labs.  He will get his next audiogram locally.     We will try to coordinate with endocrine.    Next year he will have an MRI and visit/labs in person.       Erica Knight MSN, APRN, CPNP-AC, CPON  Department of Pediatrics  Division of Hematology/Oncology       Review of the result(s) of each unique test - echocardiogram  Total time spent on the following services on the date of the encounter:  Preparing to see patient, chart review, review of outside records, Ordering medications, test, procedures, chemotherapy, Referring or communicating with other healthcare professionals, Interpretation of labs, imaging and other tests, Performing a medically appropriate examination , Counseling and educating the patient/family/caregiver , Documenting clinical information in the electronic or other health record , Communicating results to the patient/family/caregiver , Care coordination  and Total time spent: 45 min

## 2022-09-21 ENCOUNTER — VIRTUAL VISIT (OUTPATIENT)
Dept: ENDOCRINOLOGY | Facility: CLINIC | Age: 22
End: 2022-09-21
Payer: COMMERCIAL

## 2022-09-21 DIAGNOSIS — E29.1 HYPOGONADISM MALE: ICD-10-CM

## 2022-09-21 DIAGNOSIS — E23.0 HYPOPITUITARISM (H): Primary | ICD-10-CM

## 2022-09-21 PROCEDURE — 99214 OFFICE O/P EST MOD 30 MIN: CPT | Mod: 95 | Performed by: INTERNAL MEDICINE

## 2022-09-21 NOTE — LETTER
9/21/2022       RE: David Beaulieu  [address]  [facility] [address]     Dear Colleague,    Thank you for referring your patient, David Beaulieu, to the [facility] [place] at [facility]. Please see a copy of my visit note below.    David is a 21 year old who is being evaluated via a billable video visit.      How would you like to obtain your AVS? MyChart  If the video visit is dropped, the invitation should be resent by: Send to e-mail at: [email or web address]  Will anyone else be joining your video visit? No      HAROON Gamez      Video-Visit Details    Type of service:  Video Visit    Video Start Time: 10:10 am  End  10:30 am    Originating Location (pt. Location): Home    Distant Location (provider location):  [facility] ENDOCRINOLOGY     [facility]                                                                         - Endocrinology Follow up -    Reason for visit/consult: Gross hormone deficiency,  secondary hypothyroidism.    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  Denied low energy or muscle weakness. Also no significant gonadal atrophy. But he has significant thin hair. He totally forgot about gel. he has low bone dentisy in Z score    - continue current testosterone injection 150 mcg once a week     - total testosterone pending currently      # Low bone density  Last bone density in 2017, Z score spine -2.2.    - repeat bone density prior to next visit    - calcium citrate 2 tabs (elemental Ca 630 mg, vitamin D 500IU)       #Growth hormone deficiency  IGF1 in 2021 looks ok 126.       #Secondary hypothyroidism  TSH slight elevated, subclinical hypothryoidism (TSH  4.59) but clinically he is doing great. I talked about medication compliance and this year no change dose of levothyroxine.     TFT loks optimized 9/2022    -Continue current levothyroxine 125 mcg    - RTC with me in 1 year        Total 35  minutes spent on the date of the encounter doing chart review, history and exam, documentation and further activities as noted above.    Karen Caruso MD  Staff Physician  Endocrinology and Metabolism  [facility]  License: [id number]  Pager: [phone]    Interval History as of 9/21/2022 : Patient has been doing well. Last seen . Medication compliance : excellent to teststsoren once a week injection and LT4  . New event includes: started a new job, cleaning and lost 15 lb now 135 lb  .  Interval History as of 9/22/2021 : Patient has been doing well.  Medication compliance very good . New event includes: busy at work, long hour, but also daily afternoon fatigue, shaves face 1-2 times a week  .  Interval History as of 9/14/2020 : Patient has been doing well. Last seen 1 year ago. Medication compliance: totally forgot about testosterone gel   . New event includes: able to work but sometimes tired.   .  Interval History as of 9/17/2019 : Patient has been doing well. Last seen 1 year ago. Medication compliance good. New event includes: non significant . Eating wel, stable body weight, good actvity level.   HPI: A 17-year-old male here for transition care from pediatric endocrinologist for his long history of growth hormone deficiency, secondary hypogonadism, secondary hypothyroidism.  Patient came with his father. Last seen by Dr. Higgins in 9/2015.   Patient was diagnosed medulloblastoma at age 7 with a symptom of a headache and vomiting, he underwent brain surgery followed by chemotherapy and radiation therapy.  Patient developed gross hormone deficiency which was confirmed by biochemical test, was on growth hormone injection since end of 3/2010. Growth hormone  "stimulation testing 03/17/2010 showed a peak response following clonidine of 3 and arginine of 3.4 consistent with severe growth hormone deficiency. He was started on growth hormone therapy at the end of 03/2010. GH was discontinued in 4/2015. He did not have symptoms such as low energy, myalgia, arthralgia after discontinuing growth hormone injection.  He also has secondary hypothyroidism which he is currently on levothyroxine 125 mcg with good compliance.     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   Energy level: ok    Past Medical/Surgical History:  Past Medical History:   Diagnosis Date     Cancer (H)      No past surgical history on file.    Allergies:  Allergies   Allergen Reactions     Amoxicillin Hives     SUSR       Current Medications   Current Outpatient Medications   Medication     Acetaminophen (TYLENOL CHILDRENS PO)     calcium citrate-vitamin D (CALCIUM CITRATE + D) 315-250 MG-UNIT TABS per tablet     cetirizine (ZYRTEC) 10 MG tablet     famotidine (PEPCID) 20 MG tablet     fluticasone (FLONASE) 50 MCG/ACT nasal spray     GNP ARTHRITIS PAIN 1 % topical gel     ibuprofen (ADVIL,MOTRIN) 200 MG tablet     levothyroxine (SYNTHROID/LEVOTHROID) 112 MCG tablet     naproxen (NAPROSYN) 500 MG tablet     omeprazole (PRILOSEC) 20 MG DR capsule     syringe/needle, disp, (B-D SYRINGE/NEEDLE) 23G X 1\" 3 ML MISC     Syringe/Needle, Disp, 23G X 1-1/2\" 3 ML MISC     testosterone cypionate (DEPOTESTOSTERONE) 200 MG/ML injection     testosterone cypionate (DEPOTESTOSTERONE) 200 MG/ML injection     testosterone cypionate (DEPOTESTOSTERONE) 200 MG/ML injection     Current Facility-Administered Medications   Medication     COVID-19 mRNA BIVALENT vaccine (PFIZER BOOSTER) 30 MCG/0.3ML injection 30 mcg     Facility-Administered Medications Ordered in Other Visits   Medication     influenza quadrivalent (PF) vacc (FLUZONE) injection 0.5 mL       Family " History:  Family History   Problem Relation Age of Onset     Cerebrovascular Disease Mother      Childhood Heart Surgery Father         PDA s/p repair     Hypertension Father    maternal aunt: ovarian cancer, maternal grandmother: lung cancer, maternal grandfather: prostate cancer    Social History:  Social History     Tobacco Use     Smoking status: Never Smoker     Smokeless tobacco: Never Used   Substance Use Topics     Alcohol use: Not on file   lives college campus, studying to become .     ROS:  Full review of systems taken with the help of the intake sheet. Otherwise a complete 14 point review of systems was taken and is negative unless stated in the history above.      Physical Exam:   There were no vitals taken for this visit.    General: well appearing, no acute distress, pleasant and conversant,   Mental Status/neuro: alert and oriented  Skull: microcephalia  Face: symmetrical, normal facial color  Eyes: anicteric, no proptosis or lid lag  Resp: no acute distress        Labs : I reviewed data from epic and extract and summarize the pertinent data here.      Ref. Range 9/30/2015 12:20 9/20/2016 11:52 9/19/2017 12:09 9/18/2018 15:46 9/17/2019 13:45   Testosterone Total Latest Ref Range: 300 - 1,200 ng/dL 308 295 (L) 349     TSH Latest Ref Range: 0.40 - 4.00 mU/L <0.01 (L) 0.02 (L) 0.06 (L) 5.38 (H) 4.59 (H)   T4 Free Latest Ref Range: 0.76 - 1.46 ng/dL 1.60 (H) 1.46 1.37 1.04 1.14   Ins Growth Factor 1 Latest Ref Range: 137 - 428 ng/ml    232    Lutropin Latest Ref Range: 1.5 - 9.3 IU/L 3.5 2.6 5.9     FSH Latest Ref Range: 0.7 - 10.8 IU/L 9.3 7.9 7.8         MRI Brain: I personally reviewed the original images and agree with the below reports.   Results for orders placed during the hospital encounter of 09/19/17   MRI Brain w & w/o contrast with susceptibility weighted imaging    Narrative  MR BRAIN W/O & W CONTRAST 9/19/2017 11:36 AM    History: Medulloblastoma diagnosed on 02/07/2008 at 7 years  of age.  ?Initial surgical resection of his tumor with suboccipital craniotomy  and near total tumor resection on 02/07/2008.  ??  Patient was treated with subsequent chemo therapy and radiation  therapy.    ??  Comparison: Head MRI 9/20/2016, 9/30/2015 and 2/8/2008    Technique: Multiplanar T1-weighted, axial FLAIR, and susceptibility  images were obtained without intravenous contrast. Following  intravenous gadolinium-based contrast administration, axial  T2-weighted, diffusion, and T1-weighted images (in multiple planes)  were obtained.    Contrast dose: 6ml iv gadavist    Findings: Postsurgical changes of suboccipital craniotomy and  resection of fourth ventricle medulloblastoma. Hemosiderin staining  within the surgical cavity. Unchanged appearance of the resection  cavity with accompanying atrophic changes of the cerebellar vermis and  right cerebellar hemisphere likely secondary to a combination of  radiotherapy and surgery. On postcontrast series there is no enhancing  lesion to suggest residual lesion or recurrence. No abnormal  restricted diffusion within the surgical site to suggest tumor  recurrence. On susceptibility weighted imaging, there are stable  scattered punctate foci of susceptibility artifact throughout the  subcortical and periventricular white matter. These may represent  chronic microhemorrhages versus post radiation cavernomas.    Compared to previous MRI exams, there is no intracranial hemorrhage  mass effect or new enhancing lesion. Ventricular system appears stable  in size. Patent major flow voids. Venous sinuses appear normal.  Orbital structures are grossly unremarkable.    Again noted subtle increased T1 signal on noncontrast T1-weighted  images within the left dentate nucleus which may represent  accumulation of gadolinium over the years.      Impression Impression:  1. Postsurgical changes of posterior fossa medulloblastoma resection  without any evidence of tumor  recurrence.    2. Unchanged scattered foci of susceptibility artifact consistent with  either chronic microhemorrhages versus postradiation cavernomas.    3. Unchanged T1 signal in the basal ganglia and left dentate nucleus  which may represent accumulated gadolinium.    I have personally reviewed the examination and initial interpretation  and I agree with the findings.    FREDERICK SOTO MD                 Again, thank you for allowing me to participate in the care of your patient.      Sincerely,    Karen Caruso MD

## 2022-09-21 NOTE — NURSING NOTE
Pt states no changes to allergies or medications since last reviewed by staff 2 days ago. [facility]

## 2022-09-21 NOTE — PROGRESS NOTES
David is a 21 year old who is being evaluated via a billable video visit.      How would you like to obtain your AVS? MyChart  If the video visit is dropped, the invitation should be resent by: Send to e-mail at: [email or web address]  Will anyone else be joining your video visit? HAROON Gomes      Video-Visit Details    Type of service:  Video Visit    Video Start Time: 10:10 am  End  10:30 am    Originating Location (pt. Location): Home    Distant Location (provider location):   [facility] ENDOCRINOLOGY     Platform used for Video Visit: [facility]                                                                         - Endocrinology Follow up -    Reason for visit/consult: Gross hormone deficiency,  secondary hypothyroidism.    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  Denied low energy or muscle weakness. Also no significant gonadal atrophy. But he has significant thin hair. He totally forgot about gel. he has low bone dentisy in Z score    - continue current testosterone injection 150 mcg once a week     - total testosterone pending currently      # Low bone density  Last bone density in 2017, Z score spine -2.2.    - repeat bone density prior to next visit    - calcium citrate 2 tabs (elemental Ca 630 mg, vitamin D 500IU)       #Growth hormone deficiency  IGF1 in 2021 looks ok 126.       #Secondary hypothyroidism  TSH slight elevated, subclinical hypothryoidism (TSH 4.59) but clinically he is doing great. I talked about medication compliance and this year no change dose of levothyroxine.     TFT loks optimized 9/2022    -Continue current levothyroxine 125 mcg    - RTC with me in 1 year        Total 35  minutes spent on the date of the encounter doing chart review, history and  exam, documentation and further activities as noted above.    Karen Caruso MD  Staff Physician  Endocrinology and Metabolism  [facility]  [id number]  Pager: [phone]    Interval History as of 9/21/2022 : Patient has been doing well. Last seen . Medication compliance : excellent to teststsoren once a week injection and LT4  . New event includes: started a new job, cleaning and lost 15 lb now 135 lb  .  Interval History as of 9/22/2021 : Patient has been doing well.  Medication compliance very good . New event includes: busy at work, long hour, but also daily afternoon fatigue, shaves face 1-2 times a week  .  Interval History as of 9/14/2020 : Patient has been doing well. Last seen 1 year ago. Medication compliance: totally forgot about testosterone gel   . New event includes: able to work but sometimes tired.   .  Interval History as of 9/17/2019 : Patient has been doing well. Last seen 1 year ago. Medication compliance good. New event includes: non significant . Eating wel, stable body weight, good actvity level.   HPI: A 17-year-old male here for transition care from pediatric endocrinologist for his long history of growth hormone deficiency, secondary hypogonadism, secondary hypothyroidism.  Patient came with his father. Last seen by Dr. Higgins in 9/2015.   Patient was diagnosed medulloblastoma at age 7 with a symptom of a headache and vomiting, he underwent brain surgery followed by chemotherapy and radiation therapy.  Patient developed gross hormone deficiency which was confirmed by biochemical test, was on growth hormone injection since end of 3/2010. Growth hormone stimulation testing 03/17/2010 showed a peak response following clonidine of 3 and arginine of 3.4 consistent with severe growth hormone deficiency. He was started on growth hormone therapy at the end of 03/2010. GH was discontinued in 4/2015. He did not have symptoms such as low energy, myalgia, arthralgia after  "discontinuing growth hormone injection.  He also has secondary hypothyroidism which he is currently on levothyroxine 125 mcg with good compliance.     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   Energy level: ok    Past Medical/Surgical History:  Past Medical History:   Diagnosis Date     Cancer (H)      No past surgical history on file.    Allergies:  Allergies   Allergen Reactions     Amoxicillin Hives     SUSR       Current Medications   Current Outpatient Medications   Medication     Acetaminophen (TYLENOL CHILDRENS PO)     calcium citrate-vitamin D (CALCIUM CITRATE + D) 315-250 MG-UNIT TABS per tablet     cetirizine (ZYRTEC) 10 MG tablet     famotidine (PEPCID) 20 MG tablet     fluticasone (FLONASE) 50 MCG/ACT nasal spray     GNP ARTHRITIS PAIN 1 % topical gel     ibuprofen (ADVIL,MOTRIN) 200 MG tablet     levothyroxine (SYNTHROID/LEVOTHROID) 112 MCG tablet     naproxen (NAPROSYN) 500 MG tablet     omeprazole (PRILOSEC) 20 MG DR capsule     syringe/needle, disp, (B-D SYRINGE/NEEDLE) 23G X 1\" 3 ML MISC     Syringe/Needle, Disp, 23G X 1-1/2\" 3 ML MISC     testosterone cypionate (DEPOTESTOSTERONE) 200 MG/ML injection     testosterone cypionate (DEPOTESTOSTERONE) 200 MG/ML injection     testosterone cypionate (DEPOTESTOSTERONE) 200 MG/ML injection     Current Facility-Administered Medications   Medication     COVID-19 mRNA BIVALENT vaccine (PFIZER BOOSTER) 30 MCG/0.3ML injection 30 mcg     Facility-Administered Medications Ordered in Other Visits   Medication     influenza quadrivalent (PF) vacc (FLUZONE) injection 0.5 mL       Family History:  Family History   Problem Relation Age of Onset     Cerebrovascular Disease Mother      Childhood Heart Surgery Father         PDA s/p repair     Hypertension Father    maternal aunt: ovarian cancer, maternal grandmother: lung cancer, maternal grandfather: prostate cancer    Social History:  Social History "     Tobacco Use     Smoking status: Never Smoker     Smokeless tobacco: Never Used   Substance Use Topics     Alcohol use: Not on file   lives college campus, studying to become .     ROS:  Full review of systems taken with the help of the intake sheet. Otherwise a complete 14 point review of systems was taken and is negative unless stated in the history above.      Physical Exam:   There were no vitals taken for this visit.    General: well appearing, no acute distress, pleasant and conversant,   Mental Status/neuro: alert and oriented  Skull: microcephalia  Face: symmetrical, normal facial color  Eyes: anicteric, no proptosis or lid lag  Resp: no acute distress        Labs : I reviewed data from epic and extract and summarize the pertinent data here.      Ref. Range 9/30/2015 12:20 9/20/2016 11:52 9/19/2017 12:09 9/18/2018 15:46 9/17/2019 13:45   Testosterone Total Latest Ref Range: 300 - 1,200 ng/dL 308 295 (L) 349     TSH Latest Ref Range: 0.40 - 4.00 mU/L <0.01 (L) 0.02 (L) 0.06 (L) 5.38 (H) 4.59 (H)   T4 Free Latest Ref Range: 0.76 - 1.46 ng/dL 1.60 (H) 1.46 1.37 1.04 1.14   Ins Growth Factor 1 Latest Ref Range: 137 - 428 ng/ml    232    Lutropin Latest Ref Range: 1.5 - 9.3 IU/L 3.5 2.6 5.9     FSH Latest Ref Range: 0.7 - 10.8 IU/L 9.3 7.9 7.8         MRI Brain: I personally reviewed the original images and agree with the below reports.   Results for orders placed during the hospital encounter of 09/19/17   MRI Brain w & w/o contrast with susceptibility weighted imaging    Narrative  MR BRAIN W/O & W CONTRAST 9/19/2017 11:36 AM    History: Medulloblastoma diagnosed on 02/07/2008 at 7 years of age.  ?Initial surgical resection of his tumor with suboccipital craniotomy  and near total tumor resection on 02/07/2008.  ??  Patient was treated with subsequent chemo therapy and radiation  therapy.    ??  Comparison: Head MRI 9/20/2016, 9/30/2015 and 2/8/2008    Technique: Multiplanar T1-weighted, axial  FLAIR, and susceptibility  images were obtained without intravenous contrast. Following  intravenous gadolinium-based contrast administration, axial  T2-weighted, diffusion, and T1-weighted images (in multiple planes)  were obtained.    Contrast dose: 6ml iv gadavist    Findings: Postsurgical changes of suboccipital craniotomy and  resection of fourth ventricle medulloblastoma. Hemosiderin staining  within the surgical cavity. Unchanged appearance of the resection  cavity with accompanying atrophic changes of the cerebellar vermis and  right cerebellar hemisphere likely secondary to a combination of  radiotherapy and surgery. On postcontrast series there is no enhancing  lesion to suggest residual lesion or recurrence. No abnormal  restricted diffusion within the surgical site to suggest tumor  recurrence. On susceptibility weighted imaging, there are stable  scattered punctate foci of susceptibility artifact throughout the  subcortical and periventricular white matter. These may represent  chronic microhemorrhages versus post radiation cavernomas.    Compared to previous MRI exams, there is no intracranial hemorrhage  mass effect or new enhancing lesion. Ventricular system appears stable  in size. Patent major flow voids. Venous sinuses appear normal.  Orbital structures are grossly unremarkable.    Again noted subtle increased T1 signal on noncontrast T1-weighted  images within the left dentate nucleus which may represent  accumulation of gadolinium over the years.      Impression Impression:  1. Postsurgical changes of posterior fossa medulloblastoma resection  without any evidence of tumor recurrence.    2. Unchanged scattered foci of susceptibility artifact consistent with  either chronic microhemorrhages versus postradiation cavernomas.    3. Unchanged T1 signal in the basal ganglia and left dentate nucleus  which may represent accumulated gadolinium.    I have personally reviewed the examination and initial  interpretation  and I agree with the findings.    FREDERICK SOTO MD

## 2022-09-21 NOTE — LETTER
Date:September 21, 2022      Provider requested that no letter be sent. Do not send.       [facility]

## 2022-09-22 LAB — TESTOST SERPL-MCNC: 1010 NG/DL (ref 240–950)

## 2022-09-25 DIAGNOSIS — E29.1 HYPOGONADISM MALE: Primary | ICD-10-CM

## 2022-09-25 NOTE — PROGRESS NOTES
testsoterone now elevated currently he is using 150 mg weekly, we will recheck again and if still higher side, then we will reduce dose.     Shady MONTANEZ

## 2022-09-27 NOTE — PROGRESS NOTES
We had the pleasure of seeing your patient, David Beaulieu, at the [facility] Childhood Cancer Survivorship Program.  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  His tumor was located in his posterior fossa.  He was treated at the [facility] under the direction of Dr. Grant Rosenbaum.  David had initial surgical resection of his tumor which was as follows:   1.  Suboccipital craniotomy and near total tumor resection on 02/07/2008 with Dr. Wilber Valenzuela at the [facility].        David was treated as per the [facility] protocol [id number] and received the following chemotherapy:   1.  Vincristine intravenously.   2.  Cis-retinoic acid orally.   3.  Cisplatin intravenously with cumulative dose of 394 mg/meter squared.   4.  Cyclophosphamide intravenously with a cumulative dose of 12 grams/meter squared.      David also received radiation therapy as part of his treatment which is as follows:   1.  Whole brain radiation which started on 03/04/2008 and was completed on 03/31/2008 in 20 fractions, for a total dose of 3600 centigray.   2.  Spine radiation which started on 03/04/2008 was completed on 03/31/2008 in 20 fractions, for a total dose of 3600 centigray.   3.  Posterior fossa boost, which started on 04/01/2008 and was completed on 04/15/2008 in 11 fractions, for a total of 1972 centigray.   Total dose to posterior fossa is 5572 centigray.      David's acute and chronic late effects known to date include:   1.  Severe bilateral sensorineural hearing loss diagnosed on 08/21/2008- wears hearing aids.   2.  Growth deceleration found on 09/22/2009.   3.  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   4.  Lower extremity weakness diagnosed on 11/03/2009, for which he started physical therapy.   5.  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   6.   "Problems with executive functioning diagnosed on 10/25/2010.   7.  Slow processing speed diagnosed on 10/25/2010.   8.  Problems with fine motor skills diagnosed on 10/25/2010.   9.  Reduced spinal growth diagnosed on 07/24/2012.   10.  Problems with sustained attention diagnosed on 08/17/2012.   11.  Problems with emotional lability.   12.  Depression which was diagnosed on 12/03/2014.   13.  Borderline EF decreased to 52%- 9/2015; improved to 58% in 2021.  14. Dental caries       HISTORY OF PRESENT ILLNESS:  David reports to his long term follow up evaluation alone.  He has been doing well.  He continues to live in the [facility] on his own.  His father lives in [place].  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 He is seeing Dr. Caruso over video this week. Started testosterone injections weekly.  Has been doing them himself and it is going well.  No current cardiovascular concerns.  He had another echo last year that was improved.   He had local PFTs in 2018 that were at the lower limits of normal.  He saw local pulmonary and there were no concerns.   He is not longer taking Concerta for attention.  He feels like he is doing ok off of this medication.     He did disclose in 2019 that he is stopping going to college.  He says that he isn't \"book smart\" and \"school isn't for me.\"  He said that he was using his accommodations but he decided that he would rather work and he may not need a degree to do the job that he wants to do.   He last had neuropsych testing done in 12/2014.  David denies any headaches, vision changes.  He went for an eye doctor visit last year and due for yearly follow up.   He denies any seizure-like activity or tremors.  No back pain, no fever.   With regards to his hearing, he has local hearing testing and got new aids last year.  He had his hearing tested this year.    Sleep and appetite good.  No skin changes.  He did have 2 " "cavities at the dentist last visit.  Those were filled.  He is due for another dental visit and hasn't been in a while.  He feels like his 2 upper molars are chipping.  He said that he has trouble getting dentists in his area that take his insurance.  Has been working ~40 hrs/wk at [employer]. He would like to start having some of his follow up with oncology over video if possible given the distance.     REVIEW OF SYSTEMS:  Comprehensive review of systems was negative except as started above.      CURRENT MEDICATIONS:   Current Outpatient Medications   Medication     Acetaminophen (TYLENOL CHILDRENS PO)     cetirizine (ZYRTEC) 10 MG tablet     fluticasone (FLONASE) 50 MCG/ACT nasal spray     ibuprofen (ADVIL,MOTRIN) 200 MG tablet     levothyroxine (SYNTHROID/LEVOTHROID) 112 MCG tablet     syringe/needle, disp, (B-D SYRINGE/NEEDLE) 23G X 1\" 3 ML MISC     testosterone cypionate (DEPOTESTOSTERONE) 200 MG/ML injection     testosterone cypionate (DEPOTESTOSTERONE) 200 MG/ML injection     calcium citrate-vitamin D (CALCIUM CITRATE + D) 315-250 MG-UNIT TABS per tablet     famotidine (PEPCID) 20 MG tablet     GNP ARTHRITIS PAIN 1 % topical gel     naproxen (NAPROSYN) 500 MG tablet     omeprazole (PRILOSEC) 20 MG DR capsule     Syringe/Needle, Disp, 23G X 1-1/2\" 3 ML MISC     testosterone cypionate (DEPOTESTOSTERONE) 200 MG/ML injection     Current Facility-Administered Medications   Medication     COVID-19 mRNA BIVALENT vaccine (PFIZER BOOSTER) 30 MCG/0.3ML injection 30 mcg     influenza quadrivalent (PF) vacc (FLUZONE) injection 0.5 mL          ALLERGIES:  Amoxicillin.      FAMILY HISTORY:     Family History   Problem Relation Age of Onset     Cerebrovascular Disease Mother      Childhood Heart Surgery Father         PDA s/p repair     Hypertension Father    PGF, PGGF with cataracts.         SOCIAL HISTORY:  Brennenalf completed his 2nd year of college at [employer] in [place].   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        PHYSICAL EXAMINATION:     VITAL SIGNS:   /79 (BP Location: Right arm, Patient Position: Sitting, Cuff Size: Adult Regular)   Pulse 86   Temp 97.5  F (36.4  C) (Oral)   Resp 18   Ht 1.521 m (4' 11.88\")   Wt 61.2 kg (134 lb 14.7 oz)   SpO2 99%   BMI 26.45 kg/m         Wt Readings from Last 3 Encounters:   09/20/22 61.2 kg (134 lb 14.7 oz)   09/21/21 66 kg (145 lb 8.1 oz)   09/21/20 69.5 kg (153 lb 3.5 oz)     Ht Readings from Last 2 Encounters:   09/20/22 1.521 m (4' 11.88\")   09/21/21 1.519 m (4' 11.8\")     Facility age limit for growth percentiles is 20 years.      GENERAL:  David appears to be in no acute distress.  He is alert and oriented x3 and well-nourished.   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  Bilateral tympanic membranes are within normal limits.  Pupils are equally round and reactive to light and accommodation.  Extraocular movements are intact.  Fundoscopic exam negative for cataracts.  Oropharynx is clear without lesions. Some chipping noted in upper molars.  NECK:  Supple.  Thyroid is nonpalpable.  There is a well-healed occipital surgical scar.   CARDIOVASCULAR:  Regular rate and rhythm with no murmurs, rubs or gallops.  Intact distal pulses.   RESPIRATORY:  Breath sounds are clear to auscultation bilaterally with normal effort and no distress.  No wheezes, crackles or rales were auscultated.   ABDOMEN:  Soft, nondistended, nontender, with bowel sounds in all 4 quadrants, no palpable masses or tenderness.   MUSCULOSKELETAL:  Normal range of motion. Muscular hypoplasia to paraspinous muscles - stable  LYMPHATIC:  Patient has no lymphadenopathy.   NEUROLOGIC:  David is alert and oriented x3, has 2+ lower bilateral reflexes.  He has no cranial nerve deficits and has normal muscle tone.  GCS score is 15. .  Gait WNL.    SKIN:  Skin is warm and dry with no rashes or erythema.   PSYCHIATRIC: "  Mood and affect are within normal limits.      LABORATORY STUDIES:   Results for orders placed or performed during the hospital encounter of 20   Echo Pediatric (TTE) Complete     Status: None    Narrative    [id number]  [facility]  [id number]  [id number]^CORINA^CORBIN^ASH                                                               Study ID: [id number]                                                 [facility] [address]                                                [place] [address]                                                Phone: [phone]                                Pediatric Echocardiogram  ______________________________________________________________________________  Name: NATALI MALAVE  Study Date: 2022 12:51 PM             Patient Location: [facility]  MRN: [id number]                             Age: 21 yrs  : 2000  Gender: Male  Patient Class: Outpatient                   Height: 152 cm  Ordering Provider: CORBIN ARRIAGA              Weight: 66 kg  Referring Provider: CORBIN ARRIAGA             BSA: 1.6 m2  Performed By: Gene Pritchett  Report approved by: Dominic Gordon MD  Reason For Study: Evaluate LVEF  ______________________________________________________________________________  ##### CONCLUSIONS #####  Normal echocardiogram. There is normal appearance and motion of the tricuspid,  mitral, pulmonary and aortic valves. No atrial, ventricular or arterial level  shunting. Estimated right ventricular systolic pressure is 22 mmHg above right  atrial pressure. The left and right ventricles have normal chamber size, wall  thickness, and systolic function. The calculated biplane left ventricular  ejection fraction is 58%. No pericardial effusion.  ______________________________________________________________________________  Technical information:  A  complete two dimensional, MMODE, spectral and color Doppler transthoracic  echocardiogram is performed. The study quality is good. Images are obtained  from parasternal, apical, subcostal and suprasternal notch views. Prior  echocardiogram available for comparison. ECG tracing shows regular rhythm.     Segmental Anatomy:  There is normal atrial arrangement, with concordant atrioventricular and  ventriculoarterial connections.     Systemic and pulmonary veins:  The systemic venous return is normal. Normal coronary sinus. Color flow  demonstrates flow from at least one pulmonary vein entering the left atrium.     Atria and atrial septum:  Normal right atrial size. The left atrium is normal in size. There is no  atrial level shunting.     Atrioventricular valves:  The tricuspid valve is normal in appearance and motion. Trivial tricuspid  valve insufficiency. Estimated right ventricular systolic pressure is 22 mmHg  plus right atrial pressure. The mitral valve is normal in appearance and  motion. Trivial mitral valve insufficiency.     Ventricles and Ventricular Septum:  The left and right ventricles have normal chamber size, wall thickness, and  systolic function. The calculated biplane left ventricular ejection fraction  is 58 %. The calculated single plane left ventricular ejection fraction from  the 4 chamber view is 65 %. The calculated single plane left ventricular  ejection fraction from the 2 chamber view is 58 %. There is no ventricular  level shunting.     Outflow tracts:  Normal great artery relationship. There is unobstructed flow through the right  ventricular outflow tract. The pulmonary valve motion is normal. There is  normal flow across the pulmonary valve. Trivial pulmonary valve insufficiency.  There is unobstructed flow through the left ventricular outflow tract.  Tricuspid aortic valve with normal appearance and motion. There is normal flow  across the aortic valve.     Great arteries:  The main  pulmonary artery has normal appearance. There is unobstructed flow in  the main pulmonary artery. The pulmonary artery bifurcation is normal. There  is unobstructed flow in both branch pulmonary arteries. Normal ascending  aorta. The aortic arch appears normal. There is unobstructed antegrade flow in  the ascending, transverse arch, descending thoracic and abdominal aorta.     Arterial Shunts:  No obvious arterial level shunting.     Coronaries:  The coronary arteries are not evaluated.     Effusions, catheters, cannulas and leads:  No pericardial effusion.     MMode/2D Measurements & Calculations  LA dimension: 3.1 cm                       Ao root diam: 2.6 cm  LA/Ao: 1.2                                 2 Chamber EF: 50.2 %  4 Chamber EF: 65.1 %                       EF Biplane: 58.4 %  LVMI(BSA): 72.6 grams/m2                   LVMI(Height): 39.7     RWT(MM): 0.43     Doppler Measurements & Calculations  Ao V2 max: 100.2 cm/sec                  LV V1 max: 95.1 cm/sec  Ao max P.0 mmHg                      LV V1 max PG: 3.6 mmHg  PA V2 max: 113.1 cm/sec                  RV V1 max: 93.6 cm/sec  PA max P.1 mmHg                      RV V1 max PG: 3.5 mmHg  TR max trevon: 235.8 cm/sec  TR max P.2 mmHg     asc Ao max trevon: 56.4 cm/sec           desc Ao max trevon: 93.0 cm/sec  asc Ao max P.3 mmHg               desc Ao max PG: 3.5 mmHg  MPA max trevon: 110.6 cm/sec  MPA max P.9 mmHg     [place] 2D Z-SCORE VALUES  Measurement Name Value Z-ScorePredictedNormal Range  Ao sinus diam(2D)3.0 cm1.0    2.7      2.2 - 3.3  asc Aorta(2D)    2.6 cm0.67   2.4      1.9 - 3.0     [place] Z-Scores (Measurements & Calculations)  Measurement NameValue      Z-ScorePredictedNormal Range  IVSd(MM)        0.93 cm    -0.03  0.93     0.65 - 1.21  LVIDd(MM)       4.2 cm     -1.8   4.9      4.2 - 5.6  LVIDs(MM)       2.6 cm     -1.7   3.2      2.5 - 3.8  LVPWd(MM)       0.90 cm    0.23   0.87     0.64 - 1.11  LV mass(C)d(MM) 122.9  grams-1.0   150.3    102.1 - 221.3  FS(MM)          38.6 %     1.00   34.8     28.3 - 42.7     Report approved by: Magdy Cortes 09/20/2022 02:09 PM         Results for orders placed or performed in visit on 09/20/22   TSH     Status: Normal   Result Value Ref Range    TSH 1.39 0.40 - 4.00 mU/L   T4 free     Status: Normal   Result Value Ref Range    Free T4 1.28 0.76 - 1.46 ng/dL   Testosterone total     Status: Abnormal   Result Value Ref Range    Testosterone Total 1,010 (H) 240 - 950 ng/dL   Basic metabolic panel     Status: Normal   Result Value Ref Range    Sodium 138 133 - 144 mmol/L    Potassium 3.7 3.4 - 5.3 mmol/L    Chloride 102 94 - 109 mmol/L    Carbon Dioxide (CO2) 32 20 - 32 mmol/L    Anion Gap 4 3 - 14 mmol/L    Urea Nitrogen 10 7 - 30 mg/dL    Creatinine 0.82 0.66 - 1.25 mg/dL    Calcium 9.2 8.5 - 10.1 mg/dL    Glucose 70 70 - 99 mg/dL    GFR Estimate >90 >60 mL/min/1.73m2       IMAGING: see above    ASSESSMENT AND PLAN:  David Beaulieu is a now 21 year-old  male with a history of medulloblastoma that completed treatment in 04/2009 with cis-retinoic acid.  He has been off therapy for 13 years.  He has some late effects in the area of endocrinopathies as well as neurocognitive likely due to his radiation therapy.  He also has hearing loss from his cisplatin treatment.   Has some dental changes and due to see the dentist.  The following are our long term recommendations:    New late effects:  none     1.  Risk of recurrence:  David is currently 13 years from the end of treatment for his medulloblastoma.  Given the distance from the diagnosis of his primary malignancy, the likelihood of recurrence continues to decline. Now that he is 10 years off therapy we can extend his MRI to 2 years from now.  Currently we are monitoring his radiation changes.  MRI done last year was stable.   Will plan to repeat the MRI next year.    2.  Psychosocial effects:  David had previous neuropsych testing and  was found to have problems with sustained attention, processing speed and some motor skills.  He has accommodations in college but has chosen to take a break from there and work.  If he notices any significant changes in cognition, we would recommend repeat testing.     3.  Risk for cardiac toxicity:  David has a history of spine radiation, which puts him at risk for developing cardiomyopathy and valvular dysfunction.  We recommend he have an echocardiogram every 5 years.  Baseline study in 2015 showed decreased LVEF to 47% in 4 chamber and 52% biplane; also small PFO with L to R shunting.  He saw peds cardiology in 2015 but family would like to follow up locally.  He had a local echocardiogram in 2019 that was completely normal (EF 60%).  Echo repeated today with EF 58%..  We will plan to repeat in 2 years.     There is also increasing evidence regarding metabolic syndrome in cancer survivors.  We would recommend that David have fasting lipids followed at least every 2-3 years.  Last done in 2015 but he wasn't fasting today.  He should try to have it completed with primary care.    4.  Hearing loss:  David has a history of sensorineural hearing loss likely related to his posterior fossa radiation and cisplatin treatment.  He has hearing aids now and should continue his exams locally.  He is due for an exam yearly.    5.  Risk for peripheral neuropathy:  David has a history of vincristine exposure, which does put him at increased risk for peripheral motor neuropathy.  He does have some previous issues with fine motor skills, but these appear to be improved.     6.  Endocrinopathies:  David has a history of growth hormone deficiency and hypothyroidism, which are both likely from his radiation treatment.  He should have regular followup with Endocrinology as directed.     7.  Risk for secondary neoplasms/vascular changes:  David has a history of radiation, which does put him at increased risk for secondary  neoplasms.  I counseled him that should he notice any changes in his cognitive status, any new lumps, bumps, rashes that they should be promptly evaluated given his history of radiation. 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   He will have an MRI done in 24 months to evaluate post his whole brain radiation.  I did discuss with his father that we would stop doing spine examinations at this point and solely do brain MRI.    I did also discuss the risk for stroke post radiation and reviewed symptoms.  Mom does have a history of CVA and dad is unsure if she has any underlying disorder that caused this.  CBC today is normal.    8.  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  He currently has no back pain or concerns.     9.  Risk for kidney toxicity:  David has a history of cisplatin and cyclophosphamide treatment, which can cause kidney and bladder dysfunction.  David's baseline CMP and UA were within normal limits.  We recommend that he have yearly assessments of his blood pressure and urinary history.     10. Risk for cataracts:  David has a history of whole brain radiation so he has a risk for cataracts.  He should have regular eye exams completed.  David will make an appt in the near future.  No cataracts noted on exam today.    11.  David was selected to be a control on [id number].  We reviewed the consent today and answered all questions.  David signed consent and completed all necessary questionnaires.    12.  Immune:  Vaccine Health: You can now receive any vaccines that your primary care provider (PCP) recommends, there are no restrictions. Please confirm with your PCP that your vaccines are up to date, including for HPV. As a cancer/BMT survivor, the HPV vaccination should be a 3-dose series. We spoke about the vaccine today as we strongly recommend it (if  eligible). Was going to have COVID booster and flu vaccine today but had to leave with out getting them due to an appt following his visit.  Will plan to get them locally.         It was a pleasure seeing David in our Cancer Survivorship Program.  We appreciate the opportunity to participate in your patient's care.  If you have any questions or concerns, please do not hesitate to contact us at [phone].   We ask that David return in 12 months for an exam/labs.  He will get his next audiogram locally.     We will try to coordinate with endocrine.    Next year he will have an MRI and visit/labs in person.       Erica Knight MSN, APRN, CPNP-AC, CPON  Department of Pediatrics  Division of Hematology/Oncology       Review of the result(s) of each unique test - echocardiogram  Total time spent on the following services on the date of the encounter:  Preparing to see patient, chart review, review of outside records, Ordering medications, test, procedures, chemotherapy, Referring or communicating with other healthcare professionals, Interpretation of labs, imaging and other tests, Performing a medically appropriate examination , Counseling and educating the patient/family/caregiver , Documenting clinical information in the electronic or other health record , Communicating results to the patient/family/caregiver , Care coordination  and Total time spent: 45 min

## 2022-09-29 ENCOUNTER — TELEPHONE (OUTPATIENT)
Dept: CARE COORDINATION | Facility: CLINIC | Age: 22
End: 2022-09-29

## 2022-09-29 NOTE — TELEPHONE ENCOUNTER
[facility]  Childhood Cancer Survivor Program (cCSP)  Social Work Telephone Contact      Data:  On 9/20, David Beaulieu, age 21, presented to [facility] for an annual surveillance visit with the Long-Term Follow-Up Clinic. Writer was unable to meet him, due to a conflicting appointment, but was subsequently asked to complete telephonic outreach and assess needs.    Assessment:  Writer attempted to call David but was only able to leave a voicemail. Writer introduced herself, explained her role, and offered ongoing support services.     Plan:   Writer will remain available for consultation.     JIMMY Woodward, [facility]   Pediatric BMT & Survivorship    [email or web address]   Office: [phone]  Pager: [phone]        *NO LETTER

## 2022-10-18 DIAGNOSIS — E03.8 SECONDARY HYPOTHYROIDISM: ICD-10-CM

## 2022-10-22 RX ORDER — LEVOTHYROXINE SODIUM 112 UG/1
112 TABLET ORAL DAILY
Qty: 90 TABLET | Refills: 3 | Status: SHIPPED | OUTPATIENT
Start: 2022-10-22 | End: 2023-11-17

## 2022-10-22 NOTE — TELEPHONE ENCOUNTER
Last Clinic Visit: 9/21/2022  [facility] [place]  Recommended 1 year follow up  [place] 9/20/23

## 2022-10-23 DIAGNOSIS — E23.0 HYPOPITUITARISM (H): ICD-10-CM

## 2022-10-23 DIAGNOSIS — E29.1 HYPOGONADISM MALE: ICD-10-CM

## 2022-10-26 RX ORDER — TESTOSTERONE CYPIONATE 200 MG/ML
INJECTION, SOLUTION INTRAMUSCULAR
Qty: 10 ML | Refills: 5 | Status: SHIPPED | OUTPATIENT
Start: 2022-10-26 | End: 2023-05-12

## 2022-10-26 NOTE — TELEPHONE ENCOUNTER
"Testosterone Cypionate Intramuscular Solution 200 MG/ML  Last Written Prescription Date:   4/16/2021  Last Fill Quantity: 10,   # refills: 5  Last Office Visit : 9/21/2022  Future Office visit:   9/20/2023  Routing refill request to provider for review/approval because:  Drug not on the [facility], P or [facility] refill protocol or controlled substance      Luer Lock Safety Syringes Miscellaneous 23G X 1\" 3 ML  Last Written Prescription Date:   11/5/2020  Last Fill Quantity: 12,   # refills: 3  Last Office Visit : 9/21/2022  Future Office visit:   9/20/2023  Routing refill request to provider for review/approval because:  Refer to clinic for review     Ade Decker RN  Central Triage Red Flags/Med Refills    "

## 2023-04-22 ENCOUNTER — HEALTH MAINTENANCE LETTER (OUTPATIENT)
Age: 23
End: 2023-04-22

## 2023-05-11 DIAGNOSIS — E23.0 HYPOPITUITARISM (H): ICD-10-CM

## 2023-05-12 RX ORDER — TESTOSTERONE CYPIONATE 200 MG/ML
INJECTION, SOLUTION INTRAMUSCULAR
Qty: 10 ML | Refills: 5 | Status: SHIPPED | OUTPATIENT
Start: 2023-05-12 | End: 2023-09-26

## 2023-05-12 NOTE — TELEPHONE ENCOUNTER
Testosterone Cypionate Intramuscular Solution 200 MG/ML  Last Written Prescription Date:   10/26/2022  Last Fill Quantity: 10,   # refills: 5  Last Office Visit :  9/22/2021  Future Office visit:  10/4/2023    Routing refill request to provider for review/approval because:  Drug not on the [facility], [facility] or [facility] refill protocol or controlled substance      Ade Decker RN  Central Triage Red Flags/Med Refills

## 2023-09-19 ENCOUNTER — OFFICE VISIT (OUTPATIENT)
Dept: PEDIATRIC HEMATOLOGY/ONCOLOGY | Facility: CLINIC | Age: 23
End: 2023-09-19
Attending: NURSE PRACTITIONER
Payer: COMMERCIAL

## 2023-09-19 ENCOUNTER — HOSPITAL ENCOUNTER (OUTPATIENT)
Dept: MRI IMAGING | Facility: CLINIC | Age: 23
Discharge: HOME | End: 2023-09-19
Attending: NURSE PRACTITIONER
Payer: COMMERCIAL

## 2023-09-19 VITALS
WEIGHT: 136.69 LBS | HEART RATE: 78 BPM | SYSTOLIC BLOOD PRESSURE: 128 MMHG | RESPIRATION RATE: 18 BRPM | BODY MASS INDEX: 26.84 KG/M2 | HEIGHT: 60 IN | DIASTOLIC BLOOD PRESSURE: 82 MMHG | TEMPERATURE: 97.5 F | OXYGEN SATURATION: 99 %

## 2023-09-19 DIAGNOSIS — Z92.3 S/P RADIATION THERAPY: ICD-10-CM

## 2023-09-19 DIAGNOSIS — E23.0 HYPOPITUITARISM (H): ICD-10-CM

## 2023-09-19 DIAGNOSIS — E03.8 SECONDARY HYPOTHYROIDISM: ICD-10-CM

## 2023-09-19 DIAGNOSIS — C71.6 MEDULLOBLASTOMA OF CEREBELLUM (H): ICD-10-CM

## 2023-09-19 DIAGNOSIS — C71.6 MEDULLOBLASTOMA OF CEREBELLUM (H): Primary | ICD-10-CM

## 2023-09-19 DIAGNOSIS — Z92.21 STATUS POST CHEMOTHERAPY: ICD-10-CM

## 2023-09-19 DIAGNOSIS — Z91.89 AT RISK FOR CARDIOMYOPATHY: ICD-10-CM

## 2023-09-19 DIAGNOSIS — E29.1 SECONDARY MALE HYPOGONADISM: ICD-10-CM

## 2023-09-19 LAB
ANION GAP SERPL CALCULATED.3IONS-SCNC: 9 MMOL/L (ref 7–15)
BASOPHILS # BLD AUTO: 0.1 10E3/UL (ref 0–0.2)
BASOPHILS NFR BLD AUTO: 1 %
BUN SERPL-MCNC: 9 MG/DL (ref 6–20)
CALCIUM SERPL-MCNC: 10 MG/DL (ref 8.6–10)
CHLORIDE SERPL-SCNC: 100 MMOL/L (ref 98–107)
CREAT SERPL-MCNC: 0.79 MG/DL (ref 0.67–1.17)
DEPRECATED HCO3 PLAS-SCNC: 30 MMOL/L (ref 22–29)
EGFRCR SERPLBLD CKD-EPI 2021: >90 ML/MIN/1.73M2
EOSINOPHIL # BLD AUTO: 0.1 10E3/UL (ref 0–0.7)
EOSINOPHIL NFR BLD AUTO: 1 %
ERYTHROCYTE [DISTWIDTH] IN BLOOD BY AUTOMATED COUNT: 12.3 % (ref 10–15)
GLUCOSE SERPL-MCNC: 96 MG/DL (ref 70–99)
HBA1C MFR BLD: 5.7 %
HCT VFR BLD AUTO: 45.6 % (ref 40–53)
HGB BLD-MCNC: 15.1 G/DL (ref 13.3–17.7)
IMM GRANULOCYTES # BLD: 0 10E3/UL
IMM GRANULOCYTES NFR BLD: 0 %
LYMPHOCYTES # BLD AUTO: 2.6 10E3/UL (ref 0.8–5.3)
LYMPHOCYTES NFR BLD AUTO: 36 %
MCH RBC QN AUTO: 28.1 PG (ref 26.5–33)
MCHC RBC AUTO-ENTMCNC: 33.1 G/DL (ref 31.5–36.5)
MCV RBC AUTO: 85 FL (ref 78–100)
MONOCYTES # BLD AUTO: 0.4 10E3/UL (ref 0–1.3)
MONOCYTES NFR BLD AUTO: 6 %
NEUTROPHILS # BLD AUTO: 4.1 10E3/UL (ref 1.6–8.3)
NEUTROPHILS NFR BLD AUTO: 56 %
NRBC # BLD AUTO: 0 10E3/UL
NRBC BLD AUTO-RTO: 0 /100
PLATELET # BLD AUTO: 305 10E3/UL (ref 150–450)
POTASSIUM SERPL-SCNC: 3.6 MMOL/L (ref 3.4–5.3)
RBC # BLD AUTO: 5.38 10E6/UL (ref 4.4–5.9)
SODIUM SERPL-SCNC: 139 MMOL/L (ref 136–145)
T4 FREE SERPL-MCNC: 1.62 NG/DL (ref 0.9–1.7)
TSH SERPL DL<=0.005 MIU/L-ACNC: 2.08 UIU/ML (ref 0.3–4.2)
WBC # BLD AUTO: 7.2 10E3/UL (ref 4–11)

## 2023-09-19 PROCEDURE — 99215 OFFICE O/P EST HI 40 MIN: CPT | Performed by: NURSE PRACTITIONER

## 2023-09-19 PROCEDURE — 90686 IIV4 VACC NO PRSV 0.5 ML IM: CPT | Performed by: NURSE PRACTITIONER

## 2023-09-19 PROCEDURE — 84443 ASSAY THYROID STIM HORMONE: CPT | Performed by: NURSE PRACTITIONER

## 2023-09-19 PROCEDURE — A9585 GADOBUTROL INJECTION: HCPCS | Mod: JZ | Performed by: NURSE PRACTITIONER

## 2023-09-19 PROCEDURE — 70553 MRI BRAIN STEM W/O & W/DYE: CPT

## 2023-09-19 PROCEDURE — 83036 HEMOGLOBIN GLYCOSYLATED A1C: CPT | Performed by: NURSE PRACTITIONER

## 2023-09-19 PROCEDURE — 85025 COMPLETE CBC W/AUTO DIFF WBC: CPT | Performed by: NURSE PRACTITIONER

## 2023-09-19 PROCEDURE — 250N000011 HC RX IP 250 OP 636: Performed by: NURSE PRACTITIONER

## 2023-09-19 PROCEDURE — 80048 BASIC METABOLIC PNL TOTAL CA: CPT | Performed by: NURSE PRACTITIONER

## 2023-09-19 PROCEDURE — 255N000002 HC RX 255 OP 636: Mod: JZ | Performed by: NURSE PRACTITIONER

## 2023-09-19 PROCEDURE — G0008 ADMIN INFLUENZA VIRUS VAC: HCPCS | Performed by: NURSE PRACTITIONER

## 2023-09-19 PROCEDURE — 70553 MRI BRAIN STEM W/O & W/DYE: CPT | Mod: 26 | Performed by: RADIOLOGY

## 2023-09-19 PROCEDURE — 84439 ASSAY OF FREE THYROXINE: CPT | Performed by: NURSE PRACTITIONER

## 2023-09-19 PROCEDURE — G0463 HOSPITAL OUTPT CLINIC VISIT: HCPCS | Mod: 25 | Performed by: NURSE PRACTITIONER

## 2023-09-19 PROCEDURE — 84403 ASSAY OF TOTAL TESTOSTERONE: CPT | Performed by: NURSE PRACTITIONER

## 2023-09-19 PROCEDURE — 36415 COLL VENOUS BLD VENIPUNCTURE: CPT | Performed by: NURSE PRACTITIONER

## 2023-09-19 PROCEDURE — 84305 ASSAY OF SOMATOMEDIN: CPT | Performed by: NURSE PRACTITIONER

## 2023-09-19 RX ORDER — GADOBUTROL 604.72 MG/ML
6 INJECTION INTRAVENOUS ONCE
Status: COMPLETED | OUTPATIENT
Start: 2023-09-19 | End: 2023-09-19

## 2023-09-19 RX ADMIN — INFLUENZA A VIRUS A/VICTORIA/4897/2022 IVR-238 (H1N1) ANTIGEN (FORMALDEHYDE INACTIVATED), INFLUENZA A VIRUS A/DARWIN/9/2021 SAN-010 (H3N2) ANTIGEN (FORMALDEHYDE INACTIVATED), INFLUENZA B VIRUS B/PHUKET/3073/2013 ANTIGEN (FORMALDEHYDE INACTIVATED), AND INFLUENZA B VIRUS B/MICHIGAN/01/2021 ANTIGEN (FORMALDEHYDE INACTIVATED) 0.5 ML: 15; 15; 15; 15 INJECTION, SUSPENSION INTRAMUSCULAR at 15:33

## 2023-09-19 RX ADMIN — GADOBUTROL 6 ML: 604.72 INJECTION INTRAVENOUS at 13:28

## 2023-09-19 ASSESSMENT — PAIN SCALES - GENERAL: PAINLEVEL: NO PAIN (0)

## 2023-09-19 NOTE — PROGRESS NOTES
We had the pleasure of seeing your patient, David Beaulieu, at the [facility] Childhood Cancer Survivorship Program.  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  His tumor was located in his posterior fossa.  He was treated at the [facility] under the direction of Dr. Grant Rosenbaum.  David had initial surgical resection of his tumor which was as follows:   1.  Suboccipital craniotomy and near total tumor resection on 02/07/2008 with Dr. Wilber Valenzuela at the [facility].        David was treated as per the [facility] protocol [id number] and received the following chemotherapy:   1.  Vincristine intravenously.   2.  Cis-retinoic acid orally.   3.  Cisplatin intravenously with cumulative dose of 394 mg/meter squared.   4.  Cyclophosphamide intravenously with a cumulative dose of 12 grams/meter squared.      David also received radiation therapy as part of his treatment which is as follows:   1.  Whole brain radiation which started on 03/04/2008 and was completed on 03/31/2008 in 20 fractions, for a total dose of 3600 centigray.   2.  Spine radiation which started on 03/04/2008 was completed on 03/31/2008 in 20 fractions, for a total dose of 3600 centigray.   3.  Posterior fossa boost, which started on 04/01/2008 and was completed on 04/15/2008 in 11 fractions, for a total of 1972 centigray.   Total dose to posterior fossa is 5572 centigray.      David's acute and chronic late effects known to date include:   1.  Severe bilateral sensorineural hearing loss diagnosed on 08/21/2008- wears hearing aids.   2.  Growth deceleration found on 09/22/2009.   3.  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   4.  Lower extremity weakness diagnosed on 11/03/2009, for which he started physical therapy.   5.  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   6.   "Problems with executive functioning diagnosed on 10/25/2010.   7.  Slow processing speed diagnosed on 10/25/2010.   8.  Problems with fine motor skills diagnosed on 10/25/2010.   9.  Reduced spinal growth diagnosed on 07/24/2012.   10.  Problems with sustained attention diagnosed on 08/17/2012.   11.  Problems with emotional lability.   12.  Depression which was diagnosed on 12/03/2014.   13.  Borderline EF decreased to 52%- 9/2015; improved to 58% in 2021.  14. Dental caries  15.  Radiation induced cavernomas       HISTORY OF PRESENT ILLNESS:  David reports to his long term follow up evaluation alone.  He has been doing well.  He continues to live in the [facility] [place] his own.  His father lives in [place].  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  He has noticed some intermittent issues with his right arm shaking after a shift.  He does a lot of repetitive motion on the job.   He is seeing Dr. Caruso over video this week. Started testosterone injections weekly.  Has been doing them himself and it is going well.  No current cardiovascular concerns.  He had another echo last year that was improved.   He had local PFTs in 2018 that were at the lower limits of normal.  He saw local pulmonary and there were no concerns.   He is not longer taking Concerta for attention.  He feels like he is doing ok off of this medication.     He did disclose in 2019 that he is stopping going to college.  He says that he isn't \"book smart\" and \"school isn't for me.\"  He said that he was using his accommodations but he decided that he would rather work and he may not need a degree to do the job that he wants to do.   He last had neuropsych testing done in 12/2014.  David denies any headaches, vision changes.  He went for an eye doctor visit last year and due for yearly follow up.   He denies any seizure-like activity or tremors.  No back pain, no fever.   With regards to his hearing, he has " "local hearing testing and got new aids 5/2023.  He had his hearing tested this year.    Sleep and appetite good.  No skin changes.  He did have 2 cavities at the dentist last visit.  Those were filled.  He is due for another dental visit.  He said that he has trouble getting dentists in his area that take his insurance.  Was seen in the ED 6/2023 for a heat related illness.  He has fully recovered.  Occasional issues with sinus congestion. No chest pain or cough.     REVIEW OF SYSTEMS:  Comprehensive review of systems was negative except as started above.      CURRENT MEDICATIONS:   Current Outpatient Medications   Medication    Acetaminophen (TYLENOL CHILDRENS PO)    cetirizine (ZYRTEC) 10 MG tablet    ibuprofen (ADVIL,MOTRIN) 200 MG tablet    levothyroxine (SYNTHROID/LEVOTHROID) 112 MCG tablet    LUER LOCK SAFETY SYRINGES 23G X 1\" 3 ML MISC    naproxen (NAPROSYN) 500 MG tablet    omeprazole (PRILOSEC) 20 MG DR capsule    Syringe/Needle, Disp, 23G X 1-1/2\" 3 ML MISC    testosterone cypionate (DEPOTESTOSTERONE) 200 MG/ML injection    testosterone cypionate (DEPOTESTOSTERONE) 200 MG/ML injection    testosterone cypionate (DEPOTESTOSTERONE) 200 MG/ML injection    calcium citrate-vitamin D (CALCIUM CITRATE + D) 315-250 MG-UNIT TABS per tablet    famotidine (PEPCID) 20 MG tablet    fluticasone (FLONASE) 50 MCG/ACT nasal spray    GNP ARTHRITIS PAIN 1 % topical gel     No current facility-administered medications for this visit.          ALLERGIES:  Amoxicillin.      FAMILY HISTORY:     Family History   Problem Relation Age of Onset    Cerebrovascular Disease Mother     Childhood Heart Surgery Father         PDA s/p repair    Hypertension Father    PGF, PGGF with cataracts.         SOCIAL HISTORY:  David completed his 2nd year of college at [employer] in [place].   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        PHYSICAL EXAMINATION:     VITAL SIGNS:   /82 (BP Location: " "Right arm, Patient Position: Sitting, Cuff Size: Adult Regular)   Pulse 78   Temp 97.5  F (36.4  C) (Oral)   Resp 18   Ht 1.518 m (4' 11.76\")   Wt 62 kg (136 lb 11 oz)   SpO2 99%   BMI 26.91 kg/m         Wt Readings from Last 3 Encounters:   09/19/23 62 kg (136 lb 11 oz)   09/20/22 61.2 kg (134 lb 14.7 oz)   09/21/21 66 kg (145 lb 8.1 oz)     Ht Readings from Last 2 Encounters:   09/19/23 1.518 m (4' 11.76\")   09/20/22 1.521 m (4' 11.88\")     Facility age limit for growth %tiny is 20 years.      GENERAL:  David appears to be in no acute distress.  He is alert and oriented x3 and well-nourished.   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 Thin hair throughout to scalp s/p radiation.  Bilateral tympanic membranes are within normal limits.  Pupils are equally round and reactive to light and accommodation.  Extraocular movements are intact.  Fundoscopic exam negative for cataracts.  Oropharynx is clear without lesions. Some chipping noted in upper molars.  NECK:  Supple.  Thyroid is nonpalpable.  There is a well-healed occipital surgical scar.   CARDIOVASCULAR:  Regular rate and rhythm with no murmurs, rubs or gallops.  Intact distal pulses.   RESPIRATORY:  Breath sounds are clear to auscultation bilaterally with normal effort and no distress.  No wheezes, crackles or rales were auscultated.   ABDOMEN:  Soft, nondistended, nontender, with bowel sounds in all 4 quadrants, no palpable masses or tenderness.   MUSCULOSKELETAL:  Normal range of motion. Muscular hypoplasia to paraspinous muscles - stable  LYMPHATIC:  Patient has no lymphadenopathy.   NEUROLOGIC:  David is alert and oriented x3, has 2+ lower bilateral reflexes.  He has no cranial nerve deficits and has normal muscle tone.  GCS score is 15. .  Gait WNL.    SKIN:  Skin is warm and dry with no rashes or erythema.   PSYCHIATRIC:  Mood and affect are within normal limits.      LABORATORY STUDIES:   Results for orders " placed or performed in visit on 09/19/23   TSH     Status: Normal   Result Value Ref Range    TSH 2.08 0.30 - 4.20 uIU/mL   T4 free     Status: Normal   Result Value Ref Range    Free T4 1.62 0.90 - 1.70 ng/dL   Basic metabolic panel     Status: Abnormal   Result Value Ref Range    Sodium 139 136 - 145 mmol/L    Potassium 3.6 3.4 - 5.3 mmol/L    Chloride 100 98 - 107 mmol/L    Carbon Dioxide (CO2) 30 (H) 22 - 29 mmol/L    Anion Gap 9 7 - 15 mmol/L    Urea Nitrogen 9.0 6.0 - 20.0 mg/dL    Creatinine 0.79 0.67 - 1.17 mg/dL    Calcium 10.0 8.6 - 10.0 mg/dL    Glucose 96 70 - 99 mg/dL    GFR Estimate >90 >60 mL/min/1.73m2   Hemoglobin A1c     Status: Abnormal   Result Value Ref Range    Hemoglobin A1C 5.7 (H) <5.7 %   CBC with platelets and differential     Status: None   Result Value Ref Range    WBC Count 7.2 4.0 - 11.0 10e3/uL    RBC Count 5.38 4.40 - 5.90 10e6/uL    Hemoglobin 15.1 13.3 - 17.7 g/dL    Hematocrit 45.6 40.0 - 53.0 %    MCV 85 78 - 100 fL    MCH 28.1 26.5 - 33.0 pg    MCHC 33.1 31.5 - 36.5 g/dL    RDW 12.3 10.0 - 15.0 %    Platelet Count 305 150 - 450 10e3/uL    % Neutrophils 56 %    % Lymphocytes 36 %    % Monocytes 6 %    % Eosinophils 1 %    % Basophils 1 %    % Immature Granulocytes 0 %    NRBCs per 100 WBC 0 <1 /100    Absolute Neutrophils 4.1 1.6 - 8.3 10e3/uL    Absolute Lymphocytes 2.6 0.8 - 5.3 10e3/uL    Absolute Monocytes 0.4 0.0 - 1.3 10e3/uL    Absolute Eosinophils 0.1 0.0 - 0.7 10e3/uL    Absolute Basophils 0.1 0.0 - 0.2 10e3/uL    Absolute Immature Granulocytes 0.0 <=0.4 10e3/uL    Absolute NRBCs 0.0 10e3/uL   CBC with platelets differential     Status: None    Narrative    The following orders were created for panel order CBC with platelets differential.  Procedure                               Abnormality         Status                     ---------                               -----------         ------                     CBC with platelets and d...[[id number]]                       Final result                 Please view results for these tests on the individual orders.   Results for orders placed or performed during the hospital encounter of 09/19/23   MRI Brain w & w/o contrast     Status: None    Narrative    EXAM: MR BRAIN W/O & W CONTRAST  9/19/2023 2:04 PM     HISTORY:  history of medulloblastoma s/p radiation and chemotherapy  with radiation induced cavernomas; please re-evaluate; Medulloblastoma  of cerebellum (H); Status post chemotherapy; S/P radiation therapy       COMPARISON:  MRI brain 9/21/2021    TECHNIQUE: Sagittal T1-weighted, coronal T2-weighted, axial T2 FLAIR,  axial susceptibility weighted, and axial diffusion-weighted with ADC  map images of the brain were obtained without intravenous contrast.  After the administration of intravenous contrast axial and coronal  fat-suppressed T1-weighted weighted images were obtained    CONTRAST: 6.0ml gadavist.    FINDINGS:    Postsurgical/ posttherapy changes of the cerebellum status post  resection of medulloblastoma and radiation therapy. No abnormal  contrast enhancement at the resection bed or throughout the brain  parenchyma. Stable minimal FLAIR hyperintense gliosis about the  surgical site. Cerebellar volume loss on the right.    There is no mass effect, midline shift, or intracranial hemorrhage.  The ventricles are proportionate to the cerebral sulci. No diffusion  restriction abnormalities. Multiple susceptibility weighted foci  representing postradiation foci of chronic hemorrhages. Major  intracranial vascular structures are within normal limits.      Impression    IMPRESSION:  Stable MRI without evidence of tumor recurrence with unchanged  multiple small cerebellar postradiation cavernomas.    I have personally reviewed the examination and initial interpretation  and I agree with the findings.    LESA ESTRADA MD         SYSTEM ID:  [id number]       IMAGING: see above    ASSESSMENT AND PLAN:  David Beaulieu is a now 22  year-old  male with a history of medulloblastoma that completed treatment in 04/2009 with cis-retinoic acid.  He has been off therapy for 14 years.  He has some late effects in the area of endocrinopathies as well as neurocognitive likely due to his radiation therapy.  He also has hearing loss from his cisplatin treatment.   Has some dental changes and due to see the dentist. MRI stable with post radiation cavernomas.   The following are our long term recommendations:    New late effects:  radiation induced cavernomas     1.  Risk of recurrence:  David is currently 14 years from the end of treatment for his medulloblastoma.  Given the distance from the diagnosis of his primary malignancy, the likelihood of recurrence continues to decline. Now that he is 10 years off therapy we can extend his MRI to 2 years from now.  Currently we are monitoring his radiation changes.  MRI done last year was stable.   Will plan to repeat the MRI again in 2025.    2.  Psychosocial effects:  David had previous neuropsych testing and was found to have problems with sustained attention, processing speed and some motor skills.  He had accommodations in college but has chosen to take a break from there and work.  If he notices any significant changes in cognition, we would recommend repeat testing.     3.  Risk for cardiac toxicity:  David has a history of spine radiation, which puts him at risk for developing cardiomyopathy and valvular dysfunction.  We recommend he have an echocardiogram every 5 years.  Baseline study in 2015 showed decreased LVEF to 47% in 4 chamber and 52% biplane; also small PFO with L to R shunting.  He saw peds cardiology in 2015 but family would like to follow up locally.  He had a local echocardiogram in 2019 that was completely normal (EF 60%).  Echo repeated in 2022 with EF 58%..  We will plan to repeat in 2 years.     There is also increasing evidence regarding metabolic syndrome in cancer  survivors.  We would recommend that David have fasting lipids followed at least every 2-3 years.  Last done in 2015 but he wasn't fasting today.  He should try to have it completed with primary care. Hgb A1C borderline at 5.7.  Discussed diet and exercise changes.    4.  Hearing loss:  David has a history of sensorineural hearing loss likely related to his posterior fossa radiation and cisplatin treatment.  He has hearing aids now and should continue his exams locally.  Lasst done 5/2023.     5.  Risk for peripheral neuropathy:  David has a history of vincristine exposure, which does put him at increased risk for peripheral motor neuropathy.  He does have some previous issues with fine motor skills, but these appear to be improved.     6.  Endocrinopathies:  David has a history of growth hormone deficiency and hypothyroidism, which are both likely from his radiation treatment.  He should have regular followup with Endocrinology as directed.  Testosterone remains elevated this year so should discuss at upcoming visit with Dr. Caruso.     7.  Risk for secondary neoplasms/vascular changes:  David has a history of radiation, which does put him at increased risk for secondary neoplasms.  I counseled him that should he notice any changes in his cognitive status, any new lumps, bumps, rashes that they should be promptly evaluated given his history of radiation. MRI in 2023 was stable and continues to have radiation induced cavernomas.   He will have an MRI done in 24 months to evaluate post his whole brain radiation.    I did also discuss the risk for stroke post radiation and reviewed symptoms.  Mom does have a history of CVA and dad is unsure if she has any underlying disorder that caused this.  CBC today is normal.    8.  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  He currently has no back pain or concerns.      9.  Risk for kidney toxicity:  David has a history of cisplatin and cyclophosphamide treatment, which can cause kidney and bladder dysfunction.  David's baseline CMP and UA were within normal limits.  We recommend that he have yearly assessments of his blood pressure and urinary history.     10. Risk for cataracts:  David has a history of whole brain radiation so he has a risk for cataracts.  He should have regular eye exams completed.  David will make an appt in the near future.  No cataracts noted on exam today.    11.    Immune:  Vaccine Health: You can now receive any vaccines that your primary care provider (PCP) recommends, there are no restrictions. Please confirm with your PCP that your vaccines are up to date, including for HPV. As a cancer/BMT survivor, the HPV vaccination should be a 3-dose series. We spoke about the vaccine today as we strongly recommend it (if eligible). Flu vaccine given today.         It was a pleasure seeing David in our Cancer Survivorship Program.  We appreciate the opportunity to participate in your patient's care.  If you have any questions or concerns, please do not hesitate to contact us at [phone].   We ask that David return in 12 months for an exam/labs.  He will get his next audiogram locally.     We will try to coordinate with endocrine.    Next year he will have an echocardiogram and visit/labs in person.       Erica Knight MSN, APRN, CPNP-AC, CPON  Department of Pediatrics  Division of Hematology/Oncology       Review of the result(s) of each unique test - CBC, CMP, TSH, free T4, testosterone, MRI brain  Total time spent on the following services on the date of the encounter:  Preparing to see patient, chart review, review of outside records, Ordering medications, test, procedures, chemotherapy, Referring or communicating with other healthcare professionals, Interpretation of labs, imaging and other tests, Performing a medically appropriate examination ,  Counseling and educating the patient/family/caregiver , Documenting clinical information in the electronic or other health record , Communicating results to the patient/family/caregiver , Care coordination  and Total time spent: 50 min

## 2023-09-19 NOTE — LETTER
9/19/2023      RE: David Beaulieu  [address]  [facility] [address]              We had the pleasure of seeing your patient, David Beaulieu, at the [facility] Childhood Cancer Survivorship Program.  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  His tumor was located in his posterior fossa.  He was treated at the [facility] under the direction of Dr. Grant Rosenbaum.  David had initial surgical resection of his tumor which was as follows:   1.  Suboccipital craniotomy and near total tumor resection on 02/07/2008 with Dr. Wilber Valenzuela at the [facility].        David was treated as per the [facility] protocol [id number] and received the following chemotherapy:   1.  Vincristine intravenously.   2.  Cis-retinoic acid orally.   3.  Cisplatin intravenously with cumulative dose of 394 mg/meter squared.   4.  Cyclophosphamide intravenously with a cumulative dose of 12 grams/meter squared.      David also received radiation therapy as part of his treatment which is as follows:   1.  Whole brain radiation which started on 03/04/2008 and was completed on 03/31/2008 in 20 fractions, for a total dose of 3600 centigray.   2.  Spine radiation which started on 03/04/2008 was completed on 03/31/2008 in 20 fractions, for a total dose of 3600 centigray.   3.  Posterior fossa boost, which started on 04/01/2008 and was completed on 04/15/2008 in 11 fractions, for a total of 1972 centigray.   Total dose to posterior fossa is 5572 centigray.      David's acute and chronic late effects known to date include:   1.  Severe bilateral sensorineural hearing loss diagnosed on 08/21/2008- wears hearing aids.   2.  Growth deceleration found on 09/22/2009.   3.  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   4.  Lower extremity weakness diagnosed on 11/03/2009, for which he started physical  "therapy.   5.  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   6.  Problems with executive functioning diagnosed on 10/25/2010.   7.  Slow processing speed diagnosed on 10/25/2010.   8.  Problems with fine motor skills diagnosed on 10/25/2010.   9.  Reduced spinal growth diagnosed on 07/24/2012.   10.  Problems with sustained attention diagnosed on 08/17/2012.   11.  Problems with emotional lability.   12.  Depression which was diagnosed on 12/03/2014.   13.  Borderline EF decreased to 52%- 9/2015; improved to 58% in 2021.  14. Dental caries  15.  Radiation induced cavernomas       HISTORY OF PRESENT ILLNESS:  David reports to his long term follow up evaluation alone.  He has been doing well.  He continues to live in the [facility] on his own.  His father lives in [place].  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  He has noticed some intermittent issues with his right arm shaking after a shift.  He does a lot of repetitive motion on the job.   He is seeing Dr. Caruso over video this week. Started testosterone injections weekly.  Has been doing them himself and it is going well.  No current cardiovascular concerns.  He had another echo last year that was improved.   He had local PFTs in 2018 that were at the lower limits of normal.  He saw local pulmonary and there were no concerns.   He is not longer taking Concerta for attention.  He feels like he is doing ok off of this medication.     He did disclose in 2019 that he is stopping going to college.  He says that he isn't \"book smart\" and \"school isn't for me.\"  He said that he was using his accommodations but he decided that he would rather work and he may not need a degree to do the job that he wants to do.   He last had neuropsych testing done in 12/2014.  David denies any headaches, vision changes.  He went for an eye doctor visit last year and due for yearly follow up.   He denies any seizure-like activity or " "tremors.  No back pain, no fever.   With regards to his hearing, he has local hearing testing and got new aids 5/2023.  He had his hearing tested this year.    Sleep and appetite good.  No skin changes.  He did have 2 cavities at the dentist last visit.  Those were filled.  He is due for another dental visit.  He said that he has trouble getting dentists in his area that take his insurance.  Was seen in the ED 6/2023 for a heat related illness.  He has fully recovered.  Occasional issues with sinus congestion. No chest pain or cough.     REVIEW OF SYSTEMS:  Comprehensive review of systems was negative except as started above.      CURRENT MEDICATIONS:   Current Outpatient Medications   Medication     Acetaminophen (TYLENOL CHILDRENS PO)     cetirizine (ZYRTEC) 10 MG tablet     ibuprofen (ADVIL,MOTRIN) 200 MG tablet     levothyroxine (SYNTHROID/LEVOTHROID) 112 MCG tablet     LUER LOCK SAFETY SYRINGES 23G X 1\" 3 ML MISC     naproxen (NAPROSYN) 500 MG tablet     omeprazole (PRILOSEC) 20 MG DR capsule     Syringe/Needle, Disp, 23G X 1-1/2\" 3 ML MISC     testosterone cypionate (DEPOTESTOSTERONE) 200 MG/ML injection     testosterone cypionate (DEPOTESTOSTERONE) 200 MG/ML injection     testosterone cypionate (DEPOTESTOSTERONE) 200 MG/ML injection     calcium citrate-vitamin D (CALCIUM CITRATE + D) 315-250 MG-UNIT TABS per tablet     famotidine (PEPCID) 20 MG tablet     fluticasone (FLONASE) 50 MCG/ACT nasal spray     GNP ARTHRITIS PAIN 1 % topical gel     No current facility-administered medications for this visit.          ALLERGIES:  Amoxicillin.      FAMILY HISTORY:     Family History   Problem Relation Age of Onset     Cerebrovascular Disease Mother      Childhood Heart Surgery Father         PDA s/p repair     Hypertension Father    PGF, PGGF with cataracts.         SOCIAL HISTORY:  David completed his 2nd year of college at [employer] in [place].   He stopped going to college in 2019 and is working FT  " "at a local resort.        PHYSICAL EXAMINATION:     VITAL SIGNS:   /82 (BP Location: Right arm, Patient Position: Sitting, Cuff Size: Adult Regular)   Pulse 78   Temp 97.5  F (36.4  C) (Oral)   Resp 18   Ht 1.518 m (4' 11.76\")   Wt 62 kg (136 lb 11 oz)   SpO2 99%   BMI 26.91 kg/m         Wt Readings from Last 3 Encounters:   09/19/23 62 kg (136 lb 11 oz)   09/20/22 61.2 kg (134 lb 14.7 oz)   09/21/21 66 kg (145 lb 8.1 oz)     Ht Readings from Last 2 Encounters:   09/19/23 1.518 m (4' 11.76\")   09/20/22 1.521 m (4' 11.88\")     Facility age limit for growth %tiny is 20 years.      GENERAL:  David appears to be in no acute distress.  He is alert and oriented x3 and well-nourished.   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 Thin hair throughout to scalp s/p radiation.  Bilateral tympanic membranes are within normal limits.  Pupils are equally round and reactive to light and accommodation.  Extraocular movements are intact.  Fundoscopic exam negative for cataracts.  Oropharynx is clear without lesions. Some chipping noted in upper molars.  NECK:  Supple.  Thyroid is nonpalpable.  There is a well-healed occipital surgical scar.   CARDIOVASCULAR:  Regular rate and rhythm with no murmurs, rubs or gallops.  Intact distal pulses.   RESPIRATORY:  Breath sounds are clear to auscultation bilaterally with normal effort and no distress.  No wheezes, crackles or rales were auscultated.   ABDOMEN:  Soft, nondistended, nontender, with bowel sounds in all 4 quadrants, no palpable masses or tenderness.   MUSCULOSKELETAL:  Normal range of motion. Muscular hypoplasia to paraspinous muscles - stable  LYMPHATIC:  Patient has no lymphadenopathy.   NEUROLOGIC:  David is alert and oriented x3, has 2+ lower bilateral reflexes.  He has no cranial nerve deficits and has normal muscle tone.  GCS score is 15. .  Gait WNL.    SKIN:  Skin is warm and dry with no rashes or erythema.   PSYCHIATRIC:  " Mood and affect are within normal limits.      LABORATORY STUDIES:   Results for orders placed or performed in visit on 09/19/23   TSH     Status: Normal   Result Value Ref Range    TSH 2.08 0.30 - 4.20 uIU/mL   T4 free     Status: Normal   Result Value Ref Range    Free T4 1.62 0.90 - 1.70 ng/dL   Basic metabolic panel     Status: Abnormal   Result Value Ref Range    Sodium 139 136 - 145 mmol/L    Potassium 3.6 3.4 - 5.3 mmol/L    Chloride 100 98 - 107 mmol/L    Carbon Dioxide (CO2) 30 (H) 22 - 29 mmol/L    Anion Gap 9 7 - 15 mmol/L    Urea Nitrogen 9.0 6.0 - 20.0 mg/dL    Creatinine 0.79 0.67 - 1.17 mg/dL    Calcium 10.0 8.6 - 10.0 mg/dL    Glucose 96 70 - 99 mg/dL    GFR Estimate >90 >60 mL/min/1.73m2   Hemoglobin A1c     Status: Abnormal   Result Value Ref Range    Hemoglobin A1C 5.7 (H) <5.7 %   CBC with platelets and differential     Status: None   Result Value Ref Range    WBC Count 7.2 4.0 - 11.0 10e3/uL    RBC Count 5.38 4.40 - 5.90 10e6/uL    Hemoglobin 15.1 13.3 - 17.7 g/dL    Hematocrit 45.6 40.0 - 53.0 %    MCV 85 78 - 100 fL    MCH 28.1 26.5 - 33.0 pg    MCHC 33.1 31.5 - 36.5 g/dL    RDW 12.3 10.0 - 15.0 %    Platelet Count 305 150 - 450 10e3/uL    % Neutrophils 56 %    % Lymphocytes 36 %    % Monocytes 6 %    % Eosinophils 1 %    % Basophils 1 %    % Immature Granulocytes 0 %    NRBCs per 100 WBC 0 <1 /100    Absolute Neutrophils 4.1 1.6 - 8.3 10e3/uL    Absolute Lymphocytes 2.6 0.8 - 5.3 10e3/uL    Absolute Monocytes 0.4 0.0 - 1.3 10e3/uL    Absolute Eosinophils 0.1 0.0 - 0.7 10e3/uL    Absolute Basophils 0.1 0.0 - 0.2 10e3/uL    Absolute Immature Granulocytes 0.0 <=0.4 10e3/uL    Absolute NRBCs 0.0 10e3/uL   CBC with platelets differential     Status: None    Narrative    The following orders were created for panel order CBC with platelets differential.  Procedure                               Abnormality         Status                     ---------                               -----------          ------                     CBC with platelets and d...[[id number]]                      Final result                 Please view results for these tests on the individual orders.   Results for orders placed or performed during the hospital encounter of 09/19/23   MRI Brain w & w/o contrast     Status: None    Narrative    EXAM: MR BRAIN W/O & W CONTRAST  9/19/2023 2:04 PM     HISTORY:  history of medulloblastoma s/p radiation and chemotherapy  with radiation induced cavernomas; please re-evaluate; Medulloblastoma  of cerebellum (H); Status post chemotherapy; S/P radiation therapy       COMPARISON:  MRI brain 9/21/2021    TECHNIQUE: Sagittal T1-weighted, coronal T2-weighted, axial T2 FLAIR,  axial susceptibility weighted, and axial diffusion-weighted with ADC  map images of the brain were obtained without intravenous contrast.  After the administration of intravenous contrast axial and coronal  fat-suppressed T1-weighted weighted images were obtained    CONTRAST: 6.0ml gadavist.    FINDINGS:    Postsurgical/ posttherapy changes of the cerebellum status post  resection of medulloblastoma and radiation therapy. No abnormal  contrast enhancement at the resection bed or throughout the brain  parenchyma. Stable minimal FLAIR hyperintense gliosis about the  surgical site. Cerebellar volume loss on the right.    There is no mass effect, midline shift, or intracranial hemorrhage.  The ventricles are proportionate to the cerebral sulci. No diffusion  restriction abnormalities. Multiple susceptibility weighted foci  representing postradiation foci of chronic hemorrhages. Major  intracranial vascular structures are within normal limits.      Impression    IMPRESSION:  Stable MRI without evidence of tumor recurrence with unchanged  multiple small cerebellar postradiation cavernomas.    I have personally reviewed the examination and initial interpretation  and I agree with the findings.    LESA ESTRADA MD         SYSTEM ID:   [id number]       IMAGING: see above    ASSESSMENT AND PLAN:  David Beaulieu is a now 22 year-old  male with a history of medulloblastoma that completed treatment in 04/2009 with cis-retinoic acid.  He has been off therapy for 14 years.  He has some late effects in the area of endocrinopathies as well as neurocognitive likely due to his radiation therapy.  He also has hearing loss from his cisplatin treatment.   Has some dental changes and due to see the dentist. MRI stable with post radiation cavernomas.   The following are our long term recommendations:    New late effects:  radiation induced cavernomas     1.  Risk of recurrence:  David is currently 14 years from the end of treatment for his medulloblastoma.  Given the distance from the diagnosis of his primary malignancy, the likelihood of recurrence continues to decline. Now that he is 10 years off therapy we can extend his MRI to 2 years from now.  Currently we are monitoring his radiation changes.  MRI done last year was stable.   Will plan to repeat the MRI again in 2025.    2.  Psychosocial effects:  David had previous neuropsych testing and was found to have problems with sustained attention, processing speed and some motor skills.  He had accommodations in college but has chosen to take a break from there and work.  If he notices any significant changes in cognition, we would recommend repeat testing.     3.  Risk for cardiac toxicity:  David has a history of spine radiation, which puts him at risk for developing cardiomyopathy and valvular dysfunction.  We recommend he have an echocardiogram every 5 years.  Baseline study in 2015 showed decreased LVEF to 47% in 4 chamber and 52% biplane; also small PFO with L to R shunting.  He saw peds cardiology in 2015 but family would like to follow up locally.  He had a local echocardiogram in 2019 that was completely normal (EF 60%).  Echo repeated in 2022 with EF 58%..  We will plan to repeat in 2 years.      There is also increasing evidence regarding metabolic syndrome in cancer survivors.  We would recommend that David have fasting lipids followed at least every 2-3 years.  Last done in 2015 but he wasn't fasting today.  He should try to have it completed with primary care. Hgb A1C borderline at 5.7.  Discussed diet and exercise changes.    4.  Hearing loss:  David has a history of sensorineural hearing loss likely related to his posterior fossa radiation and cisplatin treatment.  He has hearing aids now and should continue his exams locally.  Lasst done 5/2023.     5.  Risk for peripheral neuropathy:  David has a history of vincristine exposure, which does put him at increased risk for peripheral motor neuropathy.  He does have some previous issues with fine motor skills, but these appear to be improved.     6.  Endocrinopathies:  David has a history of growth hormone deficiency and hypothyroidism, which are both likely from his radiation treatment.  He should have regular followup with Endocrinology as directed.  Testosterone remains elevated this year so should discuss at upcoming visit with Dr. Caruso.     7.  Risk for secondary neoplasms/vascular changes:  David has a history of radiation, which does put him at increased risk for secondary neoplasms.  I counseled him that should he notice any changes in his cognitive status, any new lumps, bumps, rashes that they should be promptly evaluated given his history of radiation. MRI in 2023 was stable and continues to have radiation induced cavernomas.   He will have an MRI done in 24 months to evaluate post his whole brain radiation.    I did also discuss the risk for stroke post radiation and reviewed symptoms.  Mom does have a history of CVA and dad is unsure if she has any underlying disorder that caused this.  CBC today is normal.    8.  Musculoskeletal:  David has some musculoskeletal hypoplasia in the way of his microcephaly as well as reduced spine  growth likely from his radiation.  He currently has no back pain or concerns.     9.  Risk for kidney toxicity:  David has a history of cisplatin and cyclophosphamide treatment, which can cause kidney and bladder dysfunction.  David's baseline CMP and UA were within normal limits.  We recommend that he have yearly assessments of his blood pressure and urinary history.     10. Risk for cataracts:  David has a history of whole brain radiation so he has a risk for cataracts.  He should have regular eye exams completed.  David will make an appt in the near future.  No cataracts noted on exam today.    11.    Immune:  Vaccine Health: You can now receive any vaccines that your primary care provider (PCP) recommends, there are no restrictions. Please confirm with your PCP that your vaccines are up to date, including for HPV. As a cancer/BMT survivor, the HPV vaccination should be a 3-dose series. We spoke about the vaccine today as we strongly recommend it (if eligible). Flu vaccine given today.         It was a pleasure seeing David in our Cancer Survivorship Program.  We appreciate the opportunity to participate in your patient's care.  If you have any questions or concerns, please do not hesitate to contact us at [phone].   We ask that David return in 12 months for an exam/labs.  He will get his next audiogram locally.     We will try to coordinate with endocrine.    Next year he will have an echocardiogram and visit/labs in person.       Erica Knight MSN, APRN, CPNP-AC, CPON  Department of Pediatrics  Division of Hematology/Oncology       Review of the result(s) of each unique test - CBC, CMP, TSH, free T4, testosterone, MRI brain  Total time spent on the following services on the date of the encounter:  Preparing to see patient, chart review, review of outside records, Ordering medications, test, procedures, chemotherapy, Referring or communicating with other healthcare professionals, Interpretation of labs,  imaging and other tests, Performing a medically appropriate examination , Counseling and educating the patient/family/caregiver , Documenting clinical information in the electronic or other health record , Communicating results to the patient/family/caregiver , Care coordination  and Total time spent: 50 min        LISA Schneider CNP

## 2023-09-19 NOTE — NURSING NOTE
"Chief Complaint   Patient presents with    RECHECK     Pt here for medulloblastoma LTFU and labs     /82 (BP Location: Right arm, Patient Position: Sitting, Cuff Size: Adult Regular)   Pulse 78   Temp 97.5  F (36.4  C) (Oral)   Resp 18   Ht 1.518 m (4' 11.76\")   Wt 62 kg (136 lb 11 oz)   SpO2 99%   BMI 26.91 kg/m      No Pain (0)  Data Unavailable    I have reviewed the patients medications and allergies    Height/weight double check needed? No    Peds Outpatient BP  1) Rested for 5 minutes, BP taken on bare arm, patient sitting (or supine for infants) w/ legs uncrossed?   Yes  2) Right arm used?  Right arm   Yes  3) Arm circumference of largest part of upper arm (in cm): 27cm  4) BP cuff sized used: Adult (25-32cm)   If used different size cuff then what was recommended why? N/A  5) First BP reading:machine   BP Readings from Last 1 Encounters:   09/19/23 128/82      Is reading >90%?No   (90% for <1 years is 90/50)  (90% for >18 years is 140/90)  *If a machine BP is at or above 90% take manual BP  6) Manual BP reading: N/A  7) Other comments: None          Juventino Beck, EMT  September 19, 2023    "

## 2023-09-19 NOTE — LETTER
9/19/2023      RE: David Beaulieu  [address]  [facility] [address]     Dear Colleague,    Thank you for the opportunity to participate in the care of your patient, David Beaulieu, at the [facility] at [facility]. Please see a copy of my visit note below.           We had the pleasure of seeing your patient, David Beaulieu, at the [facility] Childhood Cancer Survivorship [facility].  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  His tumor was located in his posterior fossa.  He was treated at the [facility] under the direction of Dr. Grant Rosenbaum.  David had initial surgical resection of his tumor which was as follows:   1.  Suboccipital craniotomy and near total tumor resection on 02/07/2008 with Dr. Wilber Valenzuela at the [facility].        David was treated as per the COG protocol [id number] and received the following chemotherapy:   1.  Vincristine intravenously.   2.  Cis-retinoic acid orally.   3.  Cisplatin intravenously with cumulative dose of 394 mg/meter squared.   4.  Cyclophosphamide intravenously with a cumulative dose of 12 grams/meter squared.      David also received radiation therapy as part of his treatment which is as follows:   1.  Whole brain radiation which started on 03/04/2008 and was completed on 03/31/2008 in 20 fractions, for a total dose of 3600 centigray.   2.  Spine radiation which started on 03/04/2008 was completed on 03/31/2008 in 20 fractions, for a total dose of 3600 centigray.   3.  Posterior fossa boost, which started on 04/01/2008 and was completed on 04/15/2008 in 11 fractions, for a total of 1972 centigray.   Total dose to posterior fossa is 5572 centigray.      David's acute and chronic late effects known to date include:   1.  Severe  "bilateral sensorineural hearing loss diagnosed on 08/21/2008- wears hearing aids.   2.  Growth deceleration found on 09/22/2009.   3.  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   4.  Lower extremity weakness diagnosed on 11/03/2009, for which he started physical therapy.   5.  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   6.  Problems with executive functioning diagnosed on 10/25/2010.   7.  Slow processing speed diagnosed on 10/25/2010.   8.  Problems with fine motor skills diagnosed on 10/25/2010.   9.  Reduced spinal growth diagnosed on 07/24/2012.   10.  Problems with sustained attention diagnosed on 08/17/2012.   11.  Problems with emotional lability.   12.  Depression which was diagnosed on 12/03/2014.   13.  Borderline EF decreased to 52%- 9/2015; improved to 58% in 2021.  14. Dental caries  15.  Radiation induced cavernomas       HISTORY OF PRESENT ILLNESS:  David reports to his long term follow up evaluation alone.  He has been doing well.  He continues to live in the [facility] on his own.  His father lives in [place].  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  He has noticed some intermittent issues with his right arm shaking after a shift.  He does a lot of repetitive motion on the job.   He is seeing Dr. Caruso over video this week. Started testosterone injections weekly.  Has been doing them himself and it is going well.  No current cardiovascular concerns.  He had another echo last year that was improved.   He had local PFTs in 2018 that were at the lower limits of normal.  He saw local pulmonary and there were no concerns.   He is not longer taking Concerta for attention.  He feels like he is doing ok off of this medication.     He did disclose in 2019 that he is stopping going to college.  He says that he isn't \"book smart\" and \"school isn't for me.\"  He said that he was using his accommodations but he decided that he would rather work and " "he may not need a degree to do the job that he wants to do.   He last had neuropsych testing done in 12/2014.  David denies any headaches, vision changes.  He went for an eye doctor visit last year and due for yearly follow up.   He denies any seizure-like activity or tremors.  No back pain, no fever.   With regards to his hearing, he has local hearing testing and got new aids 5/2023.  He had his hearing tested this year.    Sleep and appetite good.  No skin changes.  He did have 2 cavities at the dentist last visit.  Those were filled.  He is due for another dental visit.  He said that he has trouble getting dentists in his area that take his insurance.  Was seen in the ED 6/2023 for a heat related illness.  He has fully recovered.  Occasional issues with sinus congestion. No chest pain or cough.     REVIEW OF SYSTEMS:  Comprehensive review of systems was negative except as started above.      CURRENT MEDICATIONS:   Current Outpatient Medications   Medication     Acetaminophen (TYLENOL CHILDRENS PO)     cetirizine (ZYRTEC) 10 MG tablet     ibuprofen (ADVIL,MOTRIN) 200 MG tablet     levothyroxine (SYNTHROID/LEVOTHROID) 112 MCG tablet     LUER LOCK SAFETY SYRINGES 23G X 1\" 3 ML MISC     naproxen (NAPROSYN) 500 MG tablet     omeprazole (PRILOSEC) 20 MG DR capsule     Syringe/Needle, Disp, 23G X 1-1/2\" 3 ML MISC     testosterone cypionate (DEPOTESTOSTERONE) 200 MG/ML injection     testosterone cypionate (DEPOTESTOSTERONE) 200 MG/ML injection     testosterone cypionate (DEPOTESTOSTERONE) 200 MG/ML injection     calcium citrate-vitamin D (CALCIUM CITRATE + D) 315-250 MG-UNIT TABS per tablet     famotidine (PEPCID) 20 MG tablet     fluticasone (FLONASE) 50 MCG/ACT nasal spray     GNP ARTHRITIS PAIN 1 % topical gel     No current facility-administered medications for this visit.          ALLERGIES:  Amoxicillin.      FAMILY HISTORY:     Family History   Problem Relation Age of Onset     Cerebrovascular Disease Mother      " "Childhood Heart Surgery Father         PDA s/p repair     Hypertension Father    PGF, PGGF with cataracts.         SOCIAL HISTORY:  David completed his 2nd year of college at [employer] in [place].   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        PHYSICAL EXAMINATION:     VITAL SIGNS:   /82 (BP Location: Right arm, Patient Position: Sitting, Cuff Size: Adult Regular)   Pulse 78   Temp 97.5  F (36.4  C) (Oral)   Resp 18   Ht 1.518 m (4' 11.76\")   Wt 62 kg (136 lb 11 oz)   SpO2 99%   BMI 26.91 kg/m         Wt Readings from Last 3 Encounters:   09/19/23 62 kg (136 lb 11 oz)   09/20/22 61.2 kg (134 lb 14.7 oz)   09/21/21 66 kg (145 lb 8.1 oz)     Ht Readings from Last 2 Encounters:   09/19/23 1.518 m (4' 11.76\")   09/20/22 1.521 m (4' 11.88\")     Facility age limit for growth %tiny is 20 years.      GENERAL:  David appears to be in no acute distress.  He is alert and oriented x3 and well-nourished.   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 Thin hair throughout to scalp s/p radiation.  Bilateral tympanic membranes are within normal limits.  Pupils are equally round and reactive to light and accommodation.  Extraocular movements are intact.  Fundoscopic exam negative for cataracts.  Oropharynx is clear without lesions. Some chipping noted in upper molars.  NECK:  Supple.  Thyroid is nonpalpable.  There is a well-healed occipital surgical scar.   CARDIOVASCULAR:  Regular rate and rhythm with no murmurs, rubs or gallops.  Intact distal pulses.   RESPIRATORY:  Breath sounds are clear to auscultation bilaterally with normal effort and no distress.  No wheezes, crackles or rales were auscultated.   ABDOMEN:  Soft, nondistended, nontender, with bowel sounds in all 4 quadrants, no palpable masses or tenderness.   MUSCULOSKELETAL:  Normal range of motion. Muscular hypoplasia to paraspinous muscles - stable  LYMPHATIC:  Patient has no " lymphadenopathy.   NEUROLOGIC:  David is alert and oriented x3, has 2+ lower bilateral reflexes.  He has no cranial nerve deficits and has normal muscle tone.  GCS score is 15. .  Gait WNL.    SKIN:  Skin is warm and dry with no rashes or erythema.   PSYCHIATRIC:  Mood and affect are within normal limits.      LABORATORY STUDIES:   Results for orders placed or performed in visit on 09/19/23   TSH     Status: Normal   Result Value Ref Range    TSH 2.08 0.30 - 4.20 uIU/mL   T4 free     Status: Normal   Result Value Ref Range    Free T4 1.62 0.90 - 1.70 ng/dL   Basic metabolic panel     Status: Abnormal   Result Value Ref Range    Sodium 139 136 - 145 mmol/L    Potassium 3.6 3.4 - 5.3 mmol/L    Chloride 100 98 - 107 mmol/L    Carbon Dioxide (CO2) 30 (H) 22 - 29 mmol/L    Anion Gap 9 7 - 15 mmol/L    Urea Nitrogen 9.0 6.0 - 20.0 mg/dL    Creatinine 0.79 0.67 - 1.17 mg/dL    Calcium 10.0 8.6 - 10.0 mg/dL    Glucose 96 70 - 99 mg/dL    GFR Estimate >90 >60 mL/min/1.73m2   Hemoglobin A1c     Status: Abnormal   Result Value Ref Range    Hemoglobin A1C 5.7 (H) <5.7 %   CBC with platelets and differential     Status: None   Result Value Ref Range    WBC Count 7.2 4.0 - 11.0 10e3/uL    RBC Count 5.38 4.40 - 5.90 10e6/uL    Hemoglobin 15.1 13.3 - 17.7 g/dL    Hematocrit 45.6 40.0 - 53.0 %    MCV 85 78 - 100 fL    MCH 28.1 26.5 - 33.0 pg    MCHC 33.1 31.5 - 36.5 g/dL    RDW 12.3 10.0 - 15.0 %    Platelet Count 305 150 - 450 10e3/uL    % Neutrophils 56 %    % Lymphocytes 36 %    % Monocytes 6 %    % Eosinophils 1 %    % Basophils 1 %    % Immature Granulocytes 0 %    NRBCs per 100 WBC 0 <1 /100    Absolute Neutrophils 4.1 1.6 - 8.3 10e3/uL    Absolute Lymphocytes 2.6 0.8 - 5.3 10e3/uL    Absolute Monocytes 0.4 0.0 - 1.3 10e3/uL    Absolute Eosinophils 0.1 0.0 - 0.7 10e3/uL    Absolute Basophils 0.1 0.0 - 0.2 10e3/uL    Absolute Immature Granulocytes 0.0 <=0.4 10e3/uL    Absolute NRBCs 0.0 10e3/uL   CBC with platelets  differential     Status: None    Narrative    The following orders were created for panel order CBC with platelets differential.  Procedure                               Abnormality         Status                     ---------                               -----------         ------                     CBC with platelets and d...[[id number]]                      Final result                 Please view results for these tests on the individual orders.   Results for orders placed or performed during the hospital encounter of 09/19/23   MRI Brain w & w/o contrast     Status: None    Narrative    EXAM: MR BRAIN W/O & W CONTRAST  9/19/2023 2:04 PM     HISTORY:  history of medulloblastoma s/p radiation and chemotherapy  with radiation induced cavernomas; please re-evaluate; Medulloblastoma  of cerebellum (H); Status post chemotherapy; S/P radiation therapy       COMPARISON:  MRI brain 9/21/2021    TECHNIQUE: Sagittal T1-weighted, coronal T2-weighted, axial T2 FLAIR,  axial susceptibility weighted, and axial diffusion-weighted with ADC  map images of the brain were obtained without intravenous contrast.  After the administration of intravenous contrast axial and coronal  fat-suppressed T1-weighted weighted images were obtained    CONTRAST: 6.0ml gadavist.    FINDINGS:    Postsurgical/ posttherapy changes of the cerebellum status post  resection of medulloblastoma and radiation therapy. No abnormal  contrast enhancement at the resection bed or throughout the brain  parenchyma. Stable minimal FLAIR hyperintense gliosis about the  surgical site. Cerebellar volume loss on the right.    There is no mass effect, midline shift, or intracranial hemorrhage.  The ventricles are proportionate to the cerebral sulci. No diffusion  restriction abnormalities. Multiple susceptibility weighted foci  representing postradiation foci of chronic hemorrhages. Major  intracranial vascular structures are within normal limits.      Impression     IMPRESSION:  Stable MRI without evidence of tumor recurrence with unchanged  multiple small cerebellar postradiation cavernomas.    I have personally reviewed the examination and initial interpretation  and I agree with the findings.    LESA ESTRADA MD         SYSTEM ID:  [id number]       IMAGING: see above    ASSESSMENT AND PLAN:  David Beaulieu is a now 22 year-old  male with a history of medulloblastoma that completed treatment in 04/2009 with cis-retinoic acid.  He has been off therapy for 14 years.  He has some late effects in the area of endocrinopathies as well as neurocognitive likely due to his radiation therapy.  He also has hearing loss from his cisplatin treatment.   Has some dental changes and due to see the dentist. MRI stable with post radiation cavernomas.   The following are our long term recommendations:    New late effects:  radiation induced cavernomas     1.  Risk of recurrence:  David is currently 14 years from the end of treatment for his medulloblastoma.  Given the distance from the diagnosis of his primary malignancy, the likelihood of recurrence continues to decline. Now that he is 10 years off therapy we can extend his MRI to 2 years from now.  Currently we are monitoring his radiation changes.  MRI done last year was stable.   Will plan to repeat the MRI again in 2025.    2.  Psychosocial effects:  David had previous neuropsych testing and was found to have problems with sustained attention, processing speed and some motor skills.  He had accommodations in college but has chosen to take a break from there and work.  If he notices any significant changes in cognition, we would recommend repeat testing.     3.  Risk for cardiac toxicity:  David has a history of spine radiation, which puts him at risk for developing cardiomyopathy and valvular dysfunction.  We recommend he have an echocardiogram every 5 years.  Baseline study in 2015 showed decreased LVEF to 47% in 4 chamber and 52%  biplane; also small PFO with L to R shunting.  He saw peds cardiology in 2015 but family would like to follow up locally.  He had a local echocardiogram in 2019 that was completely normal (EF 60%).  Echo repeated in 2022 with EF 58%..  We will plan to repeat in 2 years.     There is also increasing evidence regarding metabolic syndrome in cancer survivors.  We would recommend that David have fasting lipids followed at least every 2-3 years.  Last done in 2015 but he wasn't fasting today.  He should try to have it completed with primary care. Hgb A1C borderline at 5.7.  Discussed diet and exercise changes.    4.  Hearing loss:  David has a history of sensorineural hearing loss likely related to his posterior fossa radiation and cisplatin treatment.  He has hearing aids now and should continue his exams locally.  Lasst done 5/2023.     5.  Risk for peripheral neuropathy:  David has a history of vincristine exposure, which does put him at increased risk for peripheral motor neuropathy.  He does have some previous issues with fine motor skills, but these appear to be improved.     6.  Endocrinopathies:  David has a history of growth hormone deficiency and hypothyroidism, which are both likely from his radiation treatment.  He should have regular followup with Endocrinology as directed.  Testosterone remains elevated this year so should discuss at upcoming visit with Dr. Caruso.     7.  Risk for secondary neoplasms/vascular changes:  David has a history of radiation, which does put him at increased risk for secondary neoplasms.  I counseled him that should he notice any changes in his cognitive status, any new lumps, bumps, rashes that they should be promptly evaluated given his history of radiation. MRI in 2023 was stable and continues to have radiation induced cavernomas.   He will have an MRI done in 24 months to evaluate post his whole brain radiation.    I did also discuss the risk for stroke post radiation and  reviewed symptoms.  Mom does have a history of CVA and dad is unsure if she has any underlying disorder that caused this.  CBC today is normal.    8.  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  He currently has no back pain or concerns.     9.  Risk for kidney toxicity:  David has a history of cisplatin and cyclophosphamide treatment, which can cause kidney and bladder dysfunction.  David's baseline CMP and UA were within normal limits.  We recommend that he have yearly assessments of his blood pressure and urinary history.     10. Risk for cataracts:  David has a history of whole brain radiation so he has a risk for cataracts.  He should have regular eye exams completed.  David will make an appt in the near future.  No cataracts noted on exam today.    11.    Immune:  Vaccine Health: You can now receive any vaccines that your primary care provider (PCP) recommends, there are no restrictions. Please confirm with your PCP that your vaccines are up to date, including for HPV. As a cancer/BMT survivor, the HPV vaccination should be a 3-dose series. We spoke about the vaccine today as we strongly recommend it (if eligible). Flu vaccine given today.         It was a pleasure seeing David in our Cancer Survivorship Program.  We appreciate the opportunity to participate in your patient's care.  If you have any questions or concerns, please do not hesitate to contact us at [phone].   We ask that David return in 12 months for an exam/labs.  He will get his next audiogram locally.     We will try to coordinate with endocrine.    Next year he will have an echocardiogram and visit/labs in person.       Erica Knight MSN, APRN, CPNP-AC, CPON  Department of Pediatrics  Division of Hematology/Oncology       Review of the result(s) of each unique test - CBC, CMP, TSH, free T4, testosterone, MRI brain  Total time spent  on the following services on the date of the encounter:  Preparing to see patient, chart review, review of outside records, Ordering medications, test, procedures, chemotherapy, Referring or communicating with other healthcare professionals, Interpretation of labs, imaging and other tests, Performing a medically appropriate examination , Counseling and educating the patient/family/caregiver , Documenting clinical information in the electronic or other health record , Communicating results to the patient/family/caregiver , Care coordination  and Total time spent: 50 min        Please do not hesitate to contact me if you have any questions/concerns.     Sincerely,       LISA Schneider CNP

## 2023-09-21 LAB — TESTOST SERPL-MCNC: 1104 NG/DL (ref 240–950)

## 2023-09-22 LAB — IGF-I BLD-MCNC: 165 NG/ML (ref 105–298)

## 2023-09-26 RX ORDER — TESTOSTERONE CYPIONATE 200 MG/ML
100 INJECTION, SOLUTION INTRAMUSCULAR WEEKLY
Qty: 10 ML | Refills: 5 | Status: SHIPPED | OUTPATIENT
Start: 2023-09-26 | End: 2024-05-17

## 2023-09-27 NOTE — RESULT ENCOUNTER NOTE
Dear David     Here is your results. We will decrease the dose of testosterone.   From 150 mg every week to 100 mg every week.     Please call nursing line, if you are [facility] patient at [phone], if you are [facility] patient at [phone],  if you have any questions.    Please take care  Karen Caruso MD

## 2023-10-04 ENCOUNTER — VIRTUAL VISIT (OUTPATIENT)
Dept: ENDOCRINOLOGY | Facility: CLINIC | Age: 23
End: 2023-10-04
Payer: COMMERCIAL

## 2023-10-04 VITALS — WEIGHT: 137.3 LBS | HEIGHT: 60 IN | BODY MASS INDEX: 26.96 KG/M2

## 2023-10-04 DIAGNOSIS — E23.0 HYPOPITUITARISM (H): ICD-10-CM

## 2023-10-04 PROCEDURE — 99214 OFFICE O/P EST MOD 30 MIN: CPT | Mod: 95 | Performed by: INTERNAL MEDICINE

## 2023-10-04 ASSESSMENT — PAIN SCALES - GENERAL: PAINLEVEL: NO PAIN (0)

## 2023-10-04 NOTE — NURSING NOTE
Is the patient currently in the state of [place]? YES    Visit mode:VIDEO    If the visit is dropped, the patient can be reconnected by: VIDEO VISIT: Text to cell phone:   Telephone Information:   Mobile [phone]       Will anyone else be joining the visit? NO  (If patient encounters technical issues they should call [phone])    How would you like to obtain your AVS? MyChart    Are changes needed to the allergy or medication list? Yes    Please remove any meds marked not taking and any flagged for removal.    Reason for visit: RECHECK    Wt/ht other than 24 hrs:    Pain more than one location:  no  Landy CANELA              English

## 2023-10-04 NOTE — LETTER
10/4/2023       RE: David Beaulieu  [address]  [facility] [address]     Dear Colleague,    Thank you for referring your patient, David Beaulieu, to the [facility] ENDOCRINOLOG[facility] [place] at [facility]. Please see a copy of my visit note below.    David is a 21 year old who is being evaluated via a billable video visit.      How would you like to obtain your AVS? MyChart  If the video visit is dropped, the invitation should be resent by: Send to e-mail at: [email or web address]  Will anyone else be joining your video visit? No          Video-Visit Details    Type of service:  Video Visit    Video Start Time: 3:30 pm  End  3:50 pm    Originating Location (pt. Location): Home    Distant Location (provider location):  [facility] ENDOCRINOLOGY     Platform used for Video Visit: [facility]                                                                         - Endocrinology Follow up -    Reason for visit/consult: Gross hormone deficiency,  secondary hypothyroidism.    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  Denied low energy or muscle weakness. Also no significant gonadal atrophy. But he has significant thin hair. He totally forgot about gel. he has low bone dentisy in Z score    Total testotserone was 1000, Hb 15, we reduced the does from 150 to 100 mg every week  - continue current testosterone injection 1o0 mcg once a week     - total testosterone pending currently      # Low bone density  Last bone density in 2017, Z score spine -2.2.    - repeat bone density prior to next visit    - calcium citrate 2 tabs (elemental Ca 630 mg, vitamin D 500IU)       #Growth hormone deficiency  IGF1 in 2021 looks ok 126.       #Secondary  hypothyroidism  TSH slight elevated, subclinical hypothryoidism (TSH 4.59) but clinically he is doing great. I talked about medication compliance and this year no change dose of levothyroxine.     TFT loks optimized 9/2022    -Continue current levothyroxine 125 mcg    - RTC with me in 1 year        Total 30  minutes spent on the date of the encounter doing chart review, history and exam, documentation and further activities as noted above.    Karen Caruso MD  Staff Physician  Endocrinology and Metabolism  [facility]  License: [id number]  Pager: [phone]    Interval History as of 10/4/2023 : Patient has been doing well. Medication compliance excellent. Interval History as of 9/21/2022 : Patient has been doing well. Last seen . Medication compliance : excellent to teststsoren once a week injection and LT4  . New event includes: started a new job, cleaning and lost 15 lb now 135 lb  .  Interval History as of 9/22/2021 : Patient has been doing well.  Medication compliance very good . New event includes: busy at work, long hour, but also daily afternoon fatigue, shaves face 1-2 times a week  .  Interval History as of 9/14/2020 : Patient has been doing well. Last seen 1 year ago. Medication compliance: totally forgot about testosterone gel   . New event includes: able to work but sometimes tired.   .  Interval History as of 9/17/2019 : Patient has been doing well. Last seen 1 year ago. Medication compliance good. New event includes: non significant . Eating wel, stable body weight, good actvity level.   HPI: A 17-year-old male here for transition care from pediatric endocrinologist for his long history of growth hormone deficiency, secondary hypogonadism, secondary hypothyroidism.  Patient came with his father. Last seen by Dr. Higgins in 9/2015.   Patient was diagnosed medulloblastoma at age 7 with a symptom of a headache and vomiting, he underwent brain surgery followed by chemotherapy and radiation  "therapy.  Patient developed gross hormone deficiency which was confirmed by biochemical test, was on growth hormone injection since end of 3/2010. Growth hormone stimulation testing 03/17/2010 showed a peak response following clonidine of 3 and arginine of 3.4 consistent with severe growth hormone deficiency. He was started on growth hormone therapy at the end of 03/2010. GH was discontinued in 4/2015. He did not have symptoms such as low energy, myalgia, arthralgia after discontinuing growth hormone injection.  He also has secondary hypothyroidism which he is currently on levothyroxine 125 mcg with good compliance.     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   Energy level: ok    Past Medical/Surgical History:  Past Medical History:   Diagnosis Date    Cancer (H)      No past surgical history on file.    Allergies:  Allergies   Allergen Reactions    Amoxicillin Hives     SUSR       Current Medications   Current Outpatient Medications   Medication    Acetaminophen (TYLENOL CHILDRENS PO)    calcium citrate-vitamin D (CALCIUM CITRATE + D) 315-250 MG-UNIT TABS per tablet    cetirizine (ZYRTEC) 10 MG tablet    famotidine (PEPCID) 20 MG tablet    fluticasone (FLONASE) 50 MCG/ACT nasal spray    GNP ARTHRITIS PAIN 1 % topical gel    ibuprofen (ADVIL,MOTRIN) 200 MG tablet    levothyroxine (SYNTHROID/LEVOTHROID) 112 MCG tablet    LUER LOCK SAFETY SYRINGES 23G X 1\" 3 ML MISC    naproxen (NAPROSYN) 500 MG tablet    omeprazole (PRILOSEC) 20 MG DR capsule    Syringe/Needle, Disp, 23G X 1-1/2\" 3 ML MISC    testosterone cypionate (DEPOTESTOSTERONE) 200 MG/ML injection    testosterone cypionate (DEPOTESTOSTERONE) 200 MG/ML injection    testosterone cypionate (DEPOTESTOSTERONE) 200 MG/ML injection     No current facility-administered medications for this visit.       Family History:  Family History   Problem Relation Age of Onset    Cerebrovascular Disease Mother  "    Childhood Heart Surgery Father         PDA s/p repair    Hypertension Father    maternal aunt: ovarian cancer, maternal grandmother: lung cancer, maternal grandfather: prostate cancer    Social History:  Social History     Tobacco Use    Smoking status: Never    Smokeless tobacco: Never   Substance Use Topics    Alcohol use: Not on file   lives college campus, studying to become .     ROS:  Full review of systems taken with the help of the intake sheet. Otherwise a complete 14 point review of systems was taken and is negative unless stated in the history above.      Physical Exam:   There were no vitals taken for this visit.    General: well appearing, no acute distress, pleasant and conversant,   Mental Status/neuro: alert and oriented  Skull: microcephalia  Face: symmetrical, normal facial color  Eyes: anicteric, no proptosis or lid lag  Resp: no acute distress        Labs : I reviewed data from epic and extract and summarize the pertinent data here.      Ref. Range 9/30/2015 12:20 9/20/2016 11:52 9/19/2017 12:09 9/18/2018 15:46 9/17/2019 13:45   Testosterone Total Latest Ref Range: 300 - 1,200 ng/dL 308 295 (L) 349     TSH Latest Ref Range: 0.40 - 4.00 mU/L <0.01 (L) 0.02 (L) 0.06 (L) 5.38 (H) 4.59 (H)   T4 Free Latest Ref Range: 0.76 - 1.46 ng/dL 1.60 (H) 1.46 1.37 1.04 1.14   Ins Growth Factor 1 Latest Ref Range: 137 - 428 ng/ml    232    Lutropin Latest Ref Range: 1.5 - 9.3 IU/L 3.5 2.6 5.9     FSH Latest Ref Range: 0.7 - 10.8 IU/L 9.3 7.9 7.8         MRI Brain: I personally reviewed the original images and agree with the below reports.   Results for orders placed during the hospital encounter of 09/19/17   MRI Brain w & w/o contrast with susceptibility weighted imaging    Narrative  MR BRAIN W/O & W CONTRAST 9/19/2017 11:36 AM    History: Medulloblastoma diagnosed on 02/07/2008 at 7 years of age.  ?Initial surgical resection of his tumor with suboccipital craniotomy  and near total tumor  resection on 02/07/2008.  ??  Patient was treated with subsequent chemo therapy and radiation  therapy.    ??  Comparison: Head MRI 9/20/2016, 9/30/2015 and 2/8/2008    Technique: Multiplanar T1-weighted, axial FLAIR, and susceptibility  images were obtained without intravenous contrast. Following  intravenous gadolinium-based contrast administration, axial  T2-weighted, diffusion, and T1-weighted images (in multiple planes)  were obtained.    Contrast dose: 6ml iv gadavist    Findings: Postsurgical changes of suboccipital craniotomy and  resection of fourth ventricle medulloblastoma. Hemosiderin staining  within the surgical cavity. Unchanged appearance of the resection  cavity with accompanying atrophic changes of the cerebellar vermis and  right cerebellar hemisphere likely secondary to a combination of  radiotherapy and surgery. On postcontrast series there is no enhancing  lesion to suggest residual lesion or recurrence. No abnormal  restricted diffusion within the surgical site to suggest tumor  recurrence. On susceptibility weighted imaging, there are stable  scattered punctate foci of susceptibility artifact throughout the  subcortical and periventricular white matter. These may represent  chronic microhemorrhages versus post radiation cavernomas.    Compared to previous MRI exams, there is no intracranial hemorrhage  mass effect or new enhancing lesion. Ventricular system appears stable  in size. Patent major flow voids. Venous sinuses appear normal.  Orbital structures are grossly unremarkable.    Again noted subtle increased T1 signal on noncontrast T1-weighted  images within the left dentate nucleus which may represent  accumulation of gadolinium over the years.      Impression Impression:  1. Postsurgical changes of posterior fossa medulloblastoma resection  without any evidence of tumor recurrence.    2. Unchanged scattered foci of susceptibility artifact consistent with  either chronic  microhemorrhages versus postradiation cavernomas.    3. Unchanged T1 signal in the basal ganglia and left dentate nucleus  which may represent accumulated gadolinium.    I have personally reviewed the examination and initial interpretation  and I agree with the findings.    FREDERICK SOTO MD

## 2023-10-04 NOTE — PROGRESS NOTES
David is a 21 year old who is being evaluated via a billable video visit.      How would you like to obtain your AVS? MyChart  If the video visit is dropped, the invitation should be resent by: Send to e-mail at: [email or web address]  Will anyone else be joining your video visit? No          Video-Visit Details    Type of service:  Video Visit    Video Start Time: 3:30 pm  End  3:50 pm    Originating Location (pt. Location): Home    Distant Location (provider location):  [facility] ENDOCRINOLOGY     Platform used for Video Visit: [employer]                                                                         - Endocrinology Follow up -    Reason for visit/consult: Gross hormone deficiency,  secondary hypothyroidism.    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  Denied low energy or muscle weakness. Also no significant gonadal atrophy. But he has significant thin hair. He totally forgot about gel. he has low bone dentisy in Z score    Total testotserone was 1000, Hb 15, we reduced the does from 150 to 100 mg every week  - continue current testosterone injection 1o0 mcg once a week     - total testosterone pending currently      # Low bone density  Last bone density in 2017, Z score spine -2.2.    - repeat bone density prior to next visit    - calcium citrate 2 tabs (elemental Ca 630 mg, vitamin D 500IU)       #Growth hormone deficiency  IGF1 in 2021 looks ok 126.       #Secondary hypothyroidism  TSH slight elevated, subclinical hypothryoidism (TSH 4.59) but clinically he is doing great. I talked about medication compliance and this year no change dose of levothyroxine.     TFT loks optimized 9/2022    -Continue current levothyroxine 125 mcg    - RTC with me in 1 year        Total 30  minutes  spent on the date of the encounter doing chart review, history and exam, documentation and further activities as noted above.    Karen Caruso MD  Staff Physician  Endocrinology and Metabolism  [facility]  License: [id number]  Pager: [phone]    Interval History as of 10/4/2023 : Patient has been doing well. Medication compliance excellent. Interval History as of 9/21/2022 : Patient has been doing well. Last seen . Medication compliance : excellent to teststsoren once a week injection and LT4  . New event includes: started a new job, cleaning and lost 15 lb now 135 lb  .  Interval History as of 9/22/2021 : Patient has been doing well.  Medication compliance very good . New event includes: busy at work, long hour, but also daily afternoon fatigue, shaves face 1-2 times a week  .  Interval History as of 9/14/2020 : Patient has been doing well. Last seen 1 year ago. Medication compliance: totally forgot about testosterone gel   . New event includes: able to work but sometimes tired.   .  Interval History as of 9/17/2019 : Patient has been doing well. Last seen 1 year ago. Medication compliance good. New event includes: non significant . Eating wel, stable body weight, good actvity level.   HPI: A 17-year-old male here for transition care from pediatric endocrinologist for his long history of growth hormone deficiency, secondary hypogonadism, secondary hypothyroidism.  Patient came with his father. Last seen by Dr. Higgins in 9/2015.   Patient was diagnosed medulloblastoma at age 7 with a symptom of a headache and vomiting, he underwent brain surgery followed by chemotherapy and radiation therapy.  Patient developed gross hormone deficiency which was confirmed by biochemical test, was on growth hormone injection since end of 3/2010. Growth hormone stimulation testing 03/17/2010 showed a peak response following clonidine of 3 and arginine of 3.4 consistent with severe growth hormone deficiency. He was  "started on growth hormone therapy at the end of 03/2010. GH was discontinued in 4/2015. He did not have symptoms such as low energy, myalgia, arthralgia after discontinuing growth hormone injection.  He also has secondary hypothyroidism which he is currently on levothyroxine 125 mcg with good compliance.     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   Energy level: ok    Past Medical/Surgical History:  Past Medical History:   Diagnosis Date    Cancer (H)      No past surgical history on file.    Allergies:  Allergies   Allergen Reactions    Amoxicillin Hives     SUSR       Current Medications   Current Outpatient Medications   Medication    Acetaminophen (TYLENOL CHILDRENS PO)    calcium citrate-vitamin D (CALCIUM CITRATE + D) 315-250 MG-UNIT TABS per tablet    cetirizine (ZYRTEC) 10 MG tablet    famotidine (PEPCID) 20 MG tablet    fluticasone (FLONASE) 50 MCG/ACT nasal spray    GNP ARTHRITIS PAIN 1 % topical gel    ibuprofen (ADVIL,MOTRIN) 200 MG tablet    levothyroxine (SYNTHROID/LEVOTHROID) 112 MCG tablet    LUER LOCK SAFETY SYRINGES 23G X 1\" 3 ML MISC    naproxen (NAPROSYN) 500 MG tablet    omeprazole (PRILOSEC) 20 MG DR capsule    Syringe/Needle, Disp, 23G X 1-1/2\" 3 ML MISC    testosterone cypionate (DEPOTESTOSTERONE) 200 MG/ML injection    testosterone cypionate (DEPOTESTOSTERONE) 200 MG/ML injection    testosterone cypionate (DEPOTESTOSTERONE) 200 MG/ML injection     No current facility-administered medications for this visit.       Family History:  Family History   Problem Relation Age of Onset    Cerebrovascular Disease Mother     Childhood Heart Surgery Father         PDA s/p repair    Hypertension Father    maternal aunt: ovarian cancer, maternal grandmother: lung cancer, maternal grandfather: prostate cancer    Social History:  Social History     Tobacco Use    Smoking status: Never    Smokeless tobacco: Never   Substance Use Topics    " Alcohol use: Not on file   lives [employer], studying to become .     ROS:  Full review of systems taken with the help of the intake sheet. Otherwise a complete 14 point review of systems was taken and is negative unless stated in the history above.      Physical Exam:   There were no vitals taken for this visit.    General: well appearing, no acute distress, pleasant and conversant,   Mental Status/neuro: alert and oriented  Skull: microcephalia  Face: symmetrical, normal facial color  Eyes: anicteric, no proptosis or lid lag  Resp: no acute distress        Labs : I reviewed data from epic and extract and summarize the pertinent data here.      Ref. Range 9/30/2015 12:20 9/20/2016 11:52 9/19/2017 12:09 9/18/2018 15:46 9/17/2019 13:45   Testosterone Total Latest Ref Range: 300 - 1,200 ng/dL 308 295 (L) 349     TSH Latest Ref Range: 0.40 - 4.00 mU/L <0.01 (L) 0.02 (L) 0.06 (L) 5.38 (H) 4.59 (H)   T4 Free Latest Ref Range: 0.76 - 1.46 ng/dL 1.60 (H) 1.46 1.37 1.04 1.14   Ins Growth Factor 1 Latest Ref Range: 137 - 428 ng/ml    232    Lutropin Latest Ref Range: 1.5 - 9.3 IU/L 3.5 2.6 5.9     FSH Latest Ref Range: 0.7 - 10.8 IU/L 9.3 7.9 7.8         MRI Brain: I personally reviewed the original images and agree with the below reports.   Results for orders placed during the hospital encounter of 09/19/17   MRI Brain w & w/o contrast with susceptibility weighted imaging    Narrative  MR BRAIN W/O & W CONTRAST 9/19/2017 11:36 AM    History: Medulloblastoma diagnosed on 02/07/2008 at 7 years of age.  ?Initial surgical resection of his tumor with suboccipital craniotomy  and near total tumor resection on 02/07/2008.  ??  Patient was treated with subsequent chemo therapy and radiation  therapy.    ??  Comparison: Head MRI 9/20/2016, 9/30/2015 and 2/8/2008    Technique: Multiplanar T1-weighted, axial FLAIR, and susceptibility  images were obtained without intravenous contrast. Following  intravenous  gadolinium-based contrast administration, axial  T2-weighted, diffusion, and T1-weighted images (in multiple planes)  were obtained.    Contrast dose: 6ml iv gadavist    Findings: Postsurgical changes of suboccipital craniotomy and  resection of fourth ventricle medulloblastoma. Hemosiderin staining  within the surgical cavity. Unchanged appearance of the resection  cavity with accompanying atrophic changes of the cerebellar vermis and  right cerebellar hemisphere likely secondary to a combination of  radiotherapy and surgery. On postcontrast series there is no enhancing  lesion to suggest residual lesion or recurrence. No abnormal  restricted diffusion within the surgical site to suggest tumor  recurrence. On susceptibility weighted imaging, there are stable  scattered punctate foci of susceptibility artifact throughout the  subcortical and periventricular white matter. These may represent  chronic microhemorrhages versus post radiation cavernomas.    Compared to previous MRI exams, there is no intracranial hemorrhage  mass effect or new enhancing lesion. Ventricular system appears stable  in size. Patent major flow voids. Venous sinuses appear normal.  Orbital structures are grossly unremarkable.    Again noted subtle increased T1 signal on noncontrast T1-weighted  images within the left dentate nucleus which may represent  accumulation of gadolinium over the years.      Impression Impression:  1. Postsurgical changes of posterior fossa medulloblastoma resection  without any evidence of tumor recurrence.    2. Unchanged scattered foci of susceptibility artifact consistent with  either chronic microhemorrhages versus postradiation cavernomas.    3. Unchanged T1 signal in the basal ganglia and left dentate nucleus  which may represent accumulated gadolinium.    I have personally reviewed the examination and initial interpretation  and I agree with the findings.    FREDERICK SOTO MD

## 2023-10-23 ENCOUNTER — DOCUMENTATION ONLY (OUTPATIENT)
Dept: CARE COORDINATION | Facility: CLINIC | Age: 23
End: 2023-10-23
Payer: COMMERCIAL

## 2023-10-23 NOTE — PROGRESS NOTES
[facility]  Childhood Cancer Survivor Program ([facility])  Social Work Progress Notes    Data:  Patient, David Beaulieu (age 23), sent a [employer] message to the [facility] medical team (Re :) medical records for social security.     Assessment:  SW contacted patient via [employer] to explain the social security application process and offer ongoing support should additional questions arise.    Plan:   SW will remain available for consultation.     JIMMY Woodward, [facility]   Pediatric BMT & Survivorship    [email or web address]   Office: [phone]  Pager: [phone]        *NO LETTER

## 2023-11-17 DIAGNOSIS — E03.8 SECONDARY HYPOTHYROIDISM: ICD-10-CM

## 2023-11-17 DIAGNOSIS — E29.1 HYPOGONADISM MALE: ICD-10-CM

## 2023-11-21 RX ORDER — LEVOTHYROXINE SODIUM 112 UG/1
112 TABLET ORAL DAILY
Qty: 90 TABLET | Refills: 3 | Status: SHIPPED | OUTPATIENT
Start: 2023-11-21

## 2023-11-21 NOTE — TELEPHONE ENCOUNTER
"levothyroxine (SYNTHROID/LEVOTHROID) 112 MCG tablet 90 tablet 3 10/22/2022     LUER LOCK SAFETY SYRINGES 23G X 1\" 3 ML MISC 12 each 3 10/26/2022       Last Office Visit: 10/4/23  Future Office visit:   9/18/24      Refill protocol passed  Letha Mtz RN    "

## 2024-02-01 ENCOUNTER — TRANSFERRED RECORDS (OUTPATIENT)
Dept: HEALTH INFORMATION MANAGEMENT | Facility: CLINIC | Age: 24
End: 2024-02-01

## 2024-05-13 DIAGNOSIS — E23.0 HYPOPITUITARISM (H): ICD-10-CM

## 2024-05-16 NOTE — TELEPHONE ENCOUNTER
Testosterone Cypionate Intramuscular Solution 200 MG/ML       Last Written Prescription Date:  9-26-23  Last Fill Quantity: 10ml,   # refills: 5  Last Office Visit : 10-4-23  Future Office visit:  9-18-24    Routing refill request to provider for review/approval because:  Drug not on the [facility], [facility] or [facility] refill protocol or controlled substance  Overdue lipid panel, AST/ALT

## 2024-05-17 RX ORDER — TESTOSTERONE CYPIONATE 200 MG/ML
100 INJECTION, SOLUTION INTRAMUSCULAR WEEKLY
Qty: 10 ML | Refills: 5 | Status: SHIPPED | OUTPATIENT
Start: 2024-05-17

## 2024-06-29 ENCOUNTER — HEALTH MAINTENANCE LETTER (OUTPATIENT)
Age: 24
End: 2024-06-29

## 2024-09-17 ENCOUNTER — HOSPITAL ENCOUNTER (OUTPATIENT)
Dept: CARDIOLOGY | Facility: CLINIC | Age: 24
Discharge: HOME | End: 2024-09-17
Attending: NURSE PRACTITIONER
Payer: COMMERCIAL

## 2024-09-17 ENCOUNTER — OFFICE VISIT (OUTPATIENT)
Dept: PEDIATRIC HEMATOLOGY/ONCOLOGY | Facility: CLINIC | Age: 24
End: 2024-09-17
Attending: NURSE PRACTITIONER
Payer: COMMERCIAL

## 2024-09-17 VITALS
HEIGHT: 60 IN | RESPIRATION RATE: 16 BRPM | HEART RATE: 72 BPM | DIASTOLIC BLOOD PRESSURE: 77 MMHG | WEIGHT: 132.28 LBS | OXYGEN SATURATION: 99 % | TEMPERATURE: 97.9 F | SYSTOLIC BLOOD PRESSURE: 130 MMHG | BODY MASS INDEX: 25.97 KG/M2

## 2024-09-17 DIAGNOSIS — E23.0 HYPOPITUITARISM (H): Primary | ICD-10-CM

## 2024-09-17 DIAGNOSIS — Z92.21 STATUS POST CHEMOTHERAPY: ICD-10-CM

## 2024-09-17 DIAGNOSIS — Z91.89 AT RISK FOR CARDIOMYOPATHY: ICD-10-CM

## 2024-09-17 DIAGNOSIS — C71.6 MEDULLOBLASTOMA OF CEREBELLUM (H): ICD-10-CM

## 2024-09-17 DIAGNOSIS — Z92.3 S/P RADIATION THERAPY: ICD-10-CM

## 2024-09-17 LAB
CHOLEST SERPL-MCNC: 226 MG/DL
FASTING STATUS PATIENT QL REPORTED: NO
HBA1C MFR BLD: 5.6 %
HDLC SERPL-MCNC: 38 MG/DL
LDLC SERPL CALC-MCNC: 147 MG/DL
NONHDLC SERPL-MCNC: 188 MG/DL
T4 FREE SERPL-MCNC: 1.32 NG/DL (ref 0.9–1.7)
TRIGL SERPL-MCNC: 204 MG/DL
TSH SERPL DL<=0.005 MIU/L-ACNC: 6.42 UIU/ML (ref 0.3–4.2)
VIT D+METAB SERPL-MCNC: 22 NG/ML (ref 20–50)

## 2024-09-17 PROCEDURE — 84439 ASSAY OF FREE THYROXINE: CPT | Performed by: PEDIATRICS

## 2024-09-17 PROCEDURE — 93306 TTE W/DOPPLER COMPLETE: CPT | Mod: 26 | Performed by: PEDIATRICS

## 2024-09-17 PROCEDURE — 84443 ASSAY THYROID STIM HORMONE: CPT | Performed by: PEDIATRICS

## 2024-09-17 PROCEDURE — 36415 COLL VENOUS BLD VENIPUNCTURE: CPT | Performed by: PEDIATRICS

## 2024-09-17 PROCEDURE — 80061 LIPID PANEL: CPT | Performed by: PEDIATRICS

## 2024-09-17 PROCEDURE — 99417 PROLNG OP E/M EACH 15 MIN: CPT | Performed by: PEDIATRICS

## 2024-09-17 PROCEDURE — 82306 VITAMIN D 25 HYDROXY: CPT | Performed by: PEDIATRICS

## 2024-09-17 PROCEDURE — 99212 OFFICE O/P EST SF 10 MIN: CPT | Performed by: PEDIATRICS

## 2024-09-17 PROCEDURE — G2211 COMPLEX E/M VISIT ADD ON: HCPCS | Performed by: PEDIATRICS

## 2024-09-17 PROCEDURE — 93306 TTE W/DOPPLER COMPLETE: CPT

## 2024-09-17 PROCEDURE — 83036 HEMOGLOBIN GLYCOSYLATED A1C: CPT | Performed by: PEDIATRICS

## 2024-09-17 PROCEDURE — 99215 OFFICE O/P EST HI 40 MIN: CPT | Performed by: PEDIATRICS

## 2024-09-17 ASSESSMENT — PAIN SCALES - GENERAL: PAINLEVEL: NO PAIN (0)

## 2024-09-17 NOTE — Clinical Note
9/17/2024      RE: David Beaulieu  [address]  [place] [id number]     Dear Colleague,    Thank you for the opportunity to participate in the care of your patient, David Beaulieu, at the [facility] at [facility]. Please see a copy of my visit note below.           We had the pleasure of seeing your patient, David Beaulieu, at the [facility] Childhood [facility].  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  His tumor was located in his posterior fossa.  He was treated at the [facility] under the direction of Dr. Grant Rosenbaum.  David had initial surgical resection of his tumor which was as follows:   1.  Suboccipital craniotomy and near total tumor resection on 02/07/2008 with Dr. Wilber Valenzuela at the [facility].        David was treated as per the COG protocol [id number] and received the following chemotherapy:   1.  Vincristine intravenously.   2.  Cis-retinoic acid orally.   3.  Cisplatin intravenously with cumulative dose of 394 mg/meter squared.   4.  Cyclophosphamide intravenously with a cumulative dose of 12 grams/meter squared.      David also received radiation therapy as part of his treatment which is as follows:   1.  Whole brain radiation which started on 03/04/2008 and was completed on 03/31/2008 in 20 fractions, for a total dose of 3600 centigray.   2.  Spine radiation which started on 03/04/2008 was completed on 03/31/2008 in 20 fractions, for a total dose of 3600 centigray.   3.  Posterior fossa boost, which started on 04/01/2008 and was completed on 04/15/2008 in 11 fractions, for a total of 1972 centigray.   Total dose to posterior fossa is 5572 centigray.      David's acute and chronic late effects known to date include:   1.  Severe  "bilateral sensorineural hearing loss diagnosed on 08/21/2008- wears hearing aids.   2.  Growth deceleration found on 09/22/2009.   3.  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   4.  Lower extremity weakness diagnosed on 11/03/2009, for which he started physical therapy.   5.  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   6.  Problems with executive functioning diagnosed on 10/25/2010.   7.  Slow processing speed diagnosed on 10/25/2010.   8.  Problems with fine motor skills diagnosed on 10/25/2010.   9.  Reduced spinal growth diagnosed on 07/24/2012.   10.  Problems with sustained attention diagnosed on 08/17/2012.   11.  Problems with emotional lability.   12.  Depression which was diagnosed on 12/03/2014.   13.  Borderline EF decreased to 52%- 9/2015; improved to 58% in 2021.  14. Dental caries  15.  Radiation induced cavernomas       HISTORY OF PRESENT ILLNESS:  David reports to his long term follow up evaluation alone.  Doing well.  He continues to live in the [facility] on his own.  His father lives in [place].  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  Got glasses for the first time since last year, he is near sighted.  He has noticed some intermittent issues with his right arm shaking after a shift.  He does a lot of repetitive motion on the job.   He is seeing Dr. Caruso over video this week. Started testosterone injections weekly.  Has been doing them himself and it is going well.  No current cardiovascular concerns.  He had another echo last year that was improved.   He had local PFTs in 2018 that were at the lower limits of normal.  He saw local pulmonary and there were no concerns.   He is not longer taking Concerta for attention.  He feels like he is doing ok off of this medication.     He did disclose in 2019 that he is stopping going to college.  He says that he isn't \"book smart\" and \"school isn't for me.\"  He said that he was using his " accommodations but he decided that he would rather work and he may not need a degree to do the job that he wants to do.   He last had neuropsych testing done in 12/2014.  David denies any headaches, vision changes.  He went for an eye doctor visit last year and due for yearly follow up.   He denies any seizure-like activity or tremors.  No back pain, no fever.   With regards to his hearing, he has local hearing testing and got new aids 5/2023.  He had his hearing tested this year.    Sleep and appetite good.  No skin changes.  He did have 2 cavities at the dentist last visit.  Those were filled.  He is due for another dental visit.  He said that he has trouble getting dentists in his area that take his insurance.  Occasional issues with sinus congestion. No chest pain or cough.     REVIEW OF SYSTEMS:  Comprehensive review of systems was negative except as started above.      CURRENT MEDICATIONS:   Current Outpatient Medications   Medication Sig Dispense Refill    Acetaminophen (TYLENOL CHILDRENS PO) Take 1 tablet by mouth every 4 hours as needed. CHEW      calcium citrate-vitamin D (CALCIUM CITRATE + D) 315-250 MG-UNIT TABS per tablet Take 2 tablets by mouth daily (Patient not taking: Reported on 10/4/2023) 180 tablet 3    cetirizine (ZYRTEC) 10 MG tablet Take 10 mg by mouth (Patient not taking: Reported on 10/4/2023)      famotidine (PEPCID) 20 MG tablet Take 1 tablet (20 mg) by mouth 2 times daily as needed (heartburn) 60 tablet 0    fluticasone (FLONASE) 50 MCG/ACT nasal spray Spray 2 sprays into both nostrils daily (Patient not taking: Reported on 10/4/2023) 16 g 6    GNP ARTHRITIS PAIN 1 % topical gel  (Patient not taking: Reported on 9/20/2022)      ibuprofen (ADVIL,MOTRIN) 200 MG tablet Take 1 tablet by mouth every 6 hours as needed.      levothyroxine (SYNTHROID/LEVOTHROID) 112 MCG tablet Take 1 tablet (112 mcg) by mouth daily 90 tablet 3    naproxen (NAPROSYN) 500 MG tablet Take 500 mg by mouth as needed  "(Patient not taking: Reported on 10/4/2023)      omeprazole (PRILOSEC) 20 MG DR capsule Take 20 mg by mouth daily      syringe/needle, disp, (LUER LOCK SAFETY SYRINGES) 23G X 1\" 3 ML MISC Use 1 weekly with testosterone injections 12 each 3    Syringe/Needle, Disp, 23G X 1-1/2\" 3 ML MISC Use to draw up testosterone for injection IM. 1x weekly. 12 each 3    testosterone cypionate (DEPOTESTOSTERONE) 200 MG/ML injection Inject 0.5 mLs (100 mg) into the muscle once a week 10 mL 5    testosterone cypionate (DEPOTESTOSTERONE) 200 MG/ML injection Inject 0.5 mLs (100 mg) into the muscle every 7 days 10 mL 5     No current facility-administered medications for this visit.          ALLERGIES:  Amoxicillin.      FAMILY HISTORY:     Family History   Problem Relation Age of [place]    Cerebrovascular Disease Mother     Childhood Heart Surgery Father         PDA s/p repair    Hypertension Father    PGF, PGGF with cataracts.  Recent Type 2 diabetes diagnosis for Mom. Also maternal GM and maternal GF as well as maternal uncle.       SOCIAL HISTORY:  David completed his 2nd year of college at [employer] in [place].   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        PHYSICAL EXAMINATION:     VITAL SIGNS:   There were no vitals taken for this visit.       Wt Readings from Last 3 Encounters:   10/04/23 62.3 kg (137 lb 4.8 oz)   09/19/23 62 kg (136 lb 11 oz)   09/20/22 61.2 kg (134 lb 14.7 oz)     Ht Readings from Last 2 Encounters:   10/04/23 1.499 m (4' 11\")   09/19/23 1.518 m (4' 11.76\")     No height and weight on file for this encounter.      GENERAL:  David appears to be in no acute distress.  He is alert and oriented x3 and well-nourished.   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 Thin hair throughout to scalp s/p radiation.  Bilateral tympanic membranes are within normal limits.  Pupils are equally round and reactive to light and accommodation.  " Extraocular movements are intact.  Fundoscopic exam negative for cataracts.  Oropharynx is clear without lesions. Some chipping noted in upper molars.  NECK:  Supple.  Thyroid is nonpalpable.  There is a well-healed occipital surgical scar.   CARDIOVASCULAR:  Regular rate and rhythm with no murmurs, rubs or gallops.  Intact distal pulses.   RESPIRATORY:  Breath sounds are clear to auscultation bilaterally with normal effort and no distress.  No wheezes, crackles or rales were auscultated.   ABDOMEN:  Soft, nondistended, nontender, with bowel sounds in all 4 quadrants, no palpable masses or tenderness.   MUSCULOSKELETAL:  Normal range of motion. Muscular hypoplasia to paraspinous muscles - stable  LYMPHATIC:  Patient has no lymphadenopathy.   NEUROLOGIC:  David is alert and oriented x3, has 2+ lower bilateral reflexes.  He has no cranial nerve deficits and has normal muscle tone.  GCS score is 15. .  Gait WNL.    SKIN:  Skin is warm and dry with no rashes or erythema.   PSYCHIATRIC:  Mood and affect are within normal limits.      LABORATORY STUDIES and IMAGES:  No results found for any visits on 09/17/24.    ECHO (9/17/2024)  Normal echocardiogram. There is normal appearance and motion of the tricuspid,  mitral, pulmonary and aortic valves. No atrial, ventricular or arterial level  shunting. The left and right ventricles have normal chamber size, wall  thickness, and systolic function. The calculated biplane left ventricular  ejection fraction is 66%. No pericardial effusion.  No significant change from last echocardiogram.    ASSESSMENT AND PLAN:  David Beaulieu is a now 23 year-old  male with a history of medulloblastoma that completed treatment in 04/2009 with cis-retinoic acid.  He has been off therapy for 15 years.  He has some late effects in the area of endocrinopathies as well as neurocognitive likely due to his radiation therapy.  He also has hearing loss from his cisplatin treatment.   The  following are our long term recommendations:    1.  Risk of recurrence:  David is currently 14 years from the end of treatment for his medulloblastoma.  Given the distance from the diagnosis of his primary malignancy, the likelihood of recurrence continues to decline. Now that he is 10 years off therapy we can extend his MRI to 2 years from now.  Currently we are monitoring his radiation changes.  MRI done last year was stable.   Will plan to repeat the MRI again in 2025.    2.  Psychosocial effects:  David had previous neuropsych testing and was found to have problems with sustained attention, processing speed and some motor skills.  He had accommodations in college but has chosen to take a break from there and work.  If he notices any significant changes in cognition, we would recommend repeat testing.     3.  Risk for cardiac toxicity:  David has a history of spine radiation, which puts him at risk for developing cardiomyopathy and valvular dysfunction.  We recommend he have an echocardiogram every 5 years.  Baseline study in 2015 showed decreased LVEF to 47% in 4 chamber and 52% biplane; also small PFO with L to R shunting.  He saw peds cardiology in 2015 but family would like to follow up locally.  He had a local echocardiogram in 2019 that was completely normal (EF 60%).  Echo repeated in 2024 with EF 66%. Plan was to repeat in 2 years, hopefully in [place], closer to home.     There is also increasing evidence regarding metabolic syndrome in cancer survivors.  We would recommend that David have fasting lipids followed at least every 2-3 years.  Last done in 2015 but he wasn't fasting today.  He should try to have it completed with primary care. Hgb A1C today is ***.  Lipid panel (cholesterol) is ***.  Discussed diet and exercise changes.    4.  Hearing loss:  David has a history of sensorineural hearing loss likely related to his posterior fossa radiation and cisplatin treatment.  He has hearing aids  now and should continue his exams locally.  Lasst done 5/2023.     5.  Risk for peripheral neuropathy:  David has a history of vincristine exposure, which does put him at increased risk for peripheral motor neuropathy.  He does have some previous issues with fine motor skills, but these appear to be improved.     6.  Endocrinopathies:  David has a history of growth hormone deficiency and hypothyroidism, which are both likely from his radiation treatment.  He should have regular followup with Endocrinology as directed.  Testosterone remains elevated this year so should discuss at upcoming visit with Dr. Caruso.     7.  Risk for secondary neoplasms/vascular changes:  David has a history of radiation, which does put him at increased risk for secondary neoplasms.  I counseled him that should he notice any changes in his cognitive status, any new lumps, bumps, rashes that they should be promptly evaluated given his history of radiation. MRI in 2023 was stable and continues to have radiation induced cavernomas.   He will have an MRI done in 24 months to evaluate post his whole brain radiation.    I did also discuss the risk for stroke post radiation and reviewed symptoms.  Mom does have a history of CVA and dad is unsure if she has any underlying disorder that caused this.  Will send a referral to local derm practice at [facility] [phone].    8.  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  He currently has no back pain or concerns.     9.  Risk for kidney toxicity:  David has a history of cisplatin and cyclophosphamide treatment, which can cause kidney and bladder dysfunction.  David's baseline CMP and UA were within normal limits.  We recommend that he have yearly assessments of his blood pressure and urinary history.     10. Risk for cataracts:  David has a history of whole brain radiation so  he has a risk for cataracts.  He should have regular eye exams completed.  David will make an appt in the near future.  No cataracts noted on exam today.    11. Immune:  Vaccine Health: You can now receive any vaccines that your primary care provider (PCP) recommends, there are no restrictions. Please confirm with your PCP that your vaccines are up to date, including for HPV. As a cancer/BMT survivor, the HPV vaccination should be a 3-dose series. We spoke about the vaccine today as we strongly recommend it (if eligible). Flu vaccine given today.      Total time spent on the following services on the date of the encounter:  Preparing to see patient, chart review, review of outside records, Ordering medications, test, procedures, chemotherapy, Referring or communicating with other healthcare professionals, Interpretation of labs, imaging and other tests, Performing a medically appropriate examination , Counseling and educating the patient/family/caregiver , Documenting clinical information in the electronic or other health record , Communicating results to the patient/family/caregiver , Care coordination , and Total time spent: 65 minutes          Please do not hesitate to contact me if you have any questions/concerns.     Sincerely,       Irlanda Lowe MD

## 2024-09-17 NOTE — PROGRESS NOTES
We had the pleasure of seeing your patient, David Beaulieu, at the [facility] Childhood Cancer Survivor [facility].  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  His tumor was located in his posterior fossa.  He was treated at the [facility] under the direction of Dr. Grant Rosenbaum.  David had initial surgical resection of his tumor which was as follows:   1.  Suboccipital craniotomy and near total tumor resection on 02/07/2008 with Dr. Wilber Valenzuela at the [facility].        David was treated as per the [facility] protocol [id number] and received the following chemotherapy:   1.  Vincristine intravenously.   2.  Cis-retinoic acid orally.   3.  Cisplatin intravenously with cumulative dose of 394 mg/meter squared.   4.  Cyclophosphamide intravenously with a cumulative dose of 12 grams/meter squared.      David also received radiation therapy as part of his treatment which is as follows:   1.  Whole brain radiation which started on 03/04/2008 and was completed on 03/31/2008 in 20 fractions, for a total dose of 3600 centigray.   2.  Spine radiation which started on 03/04/2008 was completed on 03/31/2008 in 20 fractions, for a total dose of 3600 centigray.   3.  Posterior fossa boost, which started on 04/01/2008 and was completed on 04/15/2008 in 11 fractions, for a total of 1972 centigray.   Total dose to posterior fossa is 5572 centigray.      David's acute and chronic late effects known to date include:   1.  Severe bilateral sensorineural hearing loss diagnosed on 08/21/2008- wears hearing aids.   2.  Growth deceleration found on 09/22/2009.   3.  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   4.  Lower extremity weakness diagnosed on 11/03/2009, for which he started physical therapy.   5.  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   6.   Problems with executive functioning diagnosed on 10/25/2010.   7.  Slow processing speed diagnosed on 10/25/2010.   8.  Problems with fine motor skills diagnosed on 10/25/2010.   9.  Reduced spinal growth diagnosed on 07/24/2012.   10.  Problems with sustained attention diagnosed on 08/17/2012.   11.  Problems with emotional lability.   12.  Depression which was diagnosed on 12/03/2014.   13.  Borderline EF decreased to 52%- 9/2015; improved to 58% in 2021.  14. Dental caries  15.  Radiation induced cavernomas       HISTORY OF PRESENT ILLNESS:  David reports to his long term follow up with his Dad today.  Doing well, no acute concerns.  He continues to live in the [facility] on his own.  His father lives in [place].  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  Got glasses for the first time since last year, he is near sighted.  He has noticed some intermittent issues with his right arm shaking after a shift.  He does a lot of repetitive motion on the job.   He is seeing Dr. Caruso over video this week.     No headaches or vision changes.  No seizures, just the tremors from above.  No back pain, no fever.  Sleep and appetite good.  No skin changes.  Seeing a dentist regularly.  No chest pain or cough.     REVIEW OF SYSTEMS:  Comprehensive review of systems was negative except as started above.      CURRENT MEDICATIONS:   Current Outpatient Medications   Medication Sig Dispense Refill    Acetaminophen (TYLENOL CHILDRENS PO) Take 1 tablet by mouth every 4 hours as needed. CHEW      calcium citrate-vitamin D (CALCIUM CITRATE + D) 315-250 MG-UNIT TABS per tablet Take 2 tablets by mouth daily (Patient not taking: Reported on 10/4/2023) 180 tablet 3    cetirizine (ZYRTEC) 10 MG tablet Take 10 mg by mouth (Patient not taking: Reported on 10/4/2023)      famotidine (PEPCID) 20 MG tablet Take 1 tablet (20 mg) by mouth 2 times daily as needed (heartburn) 60 tablet 0    fluticasone  "(FLONASE) 50 MCG/ACT nasal spray Spray 2 sprays into both nostrils daily (Patient not taking: Reported on 10/4/2023) 16 g 6    GNP ARTHRITIS PAIN 1 % topical gel  (Patient not taking: Reported on 9/20/2022)      ibuprofen (ADVIL,MOTRIN) 200 MG tablet Take 1 tablet by mouth every 6 hours as needed.      levothyroxine (SYNTHROID/LEVOTHROID) 112 MCG tablet Take 1 tablet (112 mcg) by mouth daily 90 tablet 3    naproxen (NAPROSYN) 500 MG tablet Take 500 mg by mouth as needed (Patient not taking: Reported on 10/4/2023)      omeprazole (PRILOSEC) 20 MG DR capsule Take 20 mg by mouth daily      syringe/needle, disp, (LUER LOCK SAFETY SYRINGES) 23G X 1\" 3 ML MISC Use 1 weekly with testosterone injections 12 each 3    Syringe/Needle, Disp, 23G X 1-1/2\" 3 ML MISC Use to draw up testosterone for injection IM. 1x weekly. 12 each 3    testosterone cypionate (DEPOTESTOSTERONE) 200 MG/ML injection Inject 0.5 mLs (100 mg) into the muscle once a week 10 mL 5    testosterone cypionate (DEPOTESTOSTERONE) 200 MG/ML injection Inject 0.5 mLs (100 mg) into the muscle every 7 days 10 mL 5     No current facility-administered medications for this visit.          ALLERGIES:  Amoxicillin.      FAMILY HISTORY:     Family History   Problem Relation Age of Onset    Cerebrovascular Disease Mother     Childhood Heart Surgery Father         PDA s/p repair    Hypertension Father    PGF, PGGF with cataracts.  Recent Type 2 diabetes diagnosis for Mom. Also maternal GM and maternal GF as well as maternal uncle.       SOCIAL HISTORY:  David completed his 2nd year of college at [employer] in [place] several years ago.     PHYSICAL EXAMINATION:     VITAL SIGNS:   There were no vitals taken for this visit.     Wt Readings from Last 3 Encounters:   10/04/23 62.3 kg (137 lb 4.8 oz)   09/19/23 62 kg (136 lb 11 oz)   09/20/22 61.2 kg (134 lb 14.7 oz)     Ht Readings from Last 2 Encounters:   10/04/23 1.499 m (4' 11\")   09/19/23 1.518 m (4' 11.76\") "     No height and weight on file for this encounter.      GENERAL:  David appears to be in no acute distress.  He is alert and oriented x3 and well-nourished.   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 Thin hair throughout to scalp s/p radiation.  Bilateral tympanic membranes are within normal limits.  Pupils are equally round and reactive to light and accommodation.  Extraocular movements are intact.  Fundoscopic exam negative for cataracts.  Oropharynx is clear without lesions. Some chipping noted in upper molars.  NECK:  Supple.  Thyroid is nonpalpable.  There is a well-healed occipital surgical scar.   CARDIOVASCULAR:  Regular rate and rhythm with no murmurs, rubs or gallops.  Intact distal pulses.   RESPIRATORY:  Breath sounds are clear to auscultation bilaterally with normal effort and no distress.  No wheezes, crackles or rales were auscultated.   ABDOMEN:  Soft, nondistended, nontender, with bowel sounds in all 4 quadrants, no palpable masses or tenderness.   MUSCULOSKELETAL:  Normal range of motion. Muscular hypoplasia to paraspinous muscles - stable  LYMPHATIC:  Patient has no lymphadenopathy.   NEUROLOGIC:  David is alert and oriented x3, has 2+ lower bilateral reflexes.  He has no cranial nerve deficits and has normal muscle tone.  GCS score is 15. .  Gait WNL.    SKIN:  Skin is warm and dry with no rashes or erythema.   PSYCHIATRIC:  Mood and affect are within normal limits.      LABORATORY STUDIES and IMAGES:   Latest Reference Range & Units 09/17/24 14:57   Cholesterol <200 mg/dL 226 (H)   Patient Fasting?  No   HDL Cholesterol >=40 mg/dL 38 (L)   Hemoglobin A1C <5.7 % 5.6   LDL Cholesterol Calculated <100 mg/dL 147 (H)   Non HDL Cholesterol <130 mg/dL 188 (H)   Triglycerides <150 mg/dL 204 (H)   Vitamin D, Total (25-Hydroxy) 20 - 50 ng/mL 22   (H): Data is abnormally high  (L): Data is abnormally low    ECHO (9/17/2024)  Normal echocardiogram. There is normal  appearance and motion of the tricuspid,  mitral, pulmonary and aortic valves. No atrial, ventricular or arterial level  shunting. The left and right ventricles have normal chamber size, wall  thickness, and systolic function. The calculated biplane left ventricular  ejection fraction is 66%. No pericardial effusion.  No significant change from last echocardiogram.    ASSESSMENT AND PLAN:  David Beaulieu is a now 23 year-old  male with a history of medulloblastoma that completed treatment in 04/2009 with cis-retinoic acid.  He has been off therapy for 15 years.  He has some late effects in the area of endocrinopathies as well as neurocognitive likely due to his radiation therapy.  He also has hearing loss from his cisplatin treatment.   The following are our long term recommendations:    1.  Risk of recurrence:  David is currently 15 years from the end of treatment for his medulloblastoma.  Given the distance from the diagnosis of his primary malignancy, the likelihood of recurrence continues to decline. Now that he is >10 years off therapy we can extend his MRI to 2 years from now.  Currently we are monitoring his radiation changes.  Last MRI was stable.   Will plan to repeat the MRI again in 2025 (ordered on 9/27/2024 to [facility] in/around 9/2025)    2.  Psychosocial effects:  David had previous neuropsych testing and was found to have problems with sustained attention, processing speed and some motor skills.  He had accommodations in college but has chosen to take a break from there and work.  If he notices any significant changes in cognition, we would recommend repeat testing.     3.  Risk for cardiac toxicity:  David has a history of spine radiation, which puts him at risk for developing cardiomyopathy and valvular dysfunction.  We recommend he have an echocardiogram every 5 years.  Baseline study in 2015 showed decreased LVEF to 47% in 4 chamber and 52% biplane; also small PFO with L to R shunting.   He saw peds cardiology in 2015 but family would like to follow up locally.  He had a local echocardiogram in 2019 that was completely normal (EF 60%).  Echo repeated in 2024 with EF 66%. Plan was to repeat in 2 years, hopefully in [place], closer to home.     There is also increasing evidence regarding metabolic syndrome in cancer survivors.  We would recommend that David have fasting lipids followed at least every 2-3 years.  Last done in 2015 but he wasn't fasting today.  He should try to have it completed with primary care. Hgb A1C today is normal.  Lipid panel (cholesterol) is elevated while not fasting so will repeat next year when fasting.  Discussed diet and exercise changes.    4.  Hearing loss:  David has a history of sensorineural hearing loss likely related to his posterior fossa radiation and cisplatin treatment.  He has hearing aids now and should continue his exams locally.  Lasst done 5/2023.     5.  Risk for peripheral neuropathy:  David has a history of vincristine exposure, which does put him at increased risk for peripheral motor neuropathy.  He does have some previous issues with fine motor skills, but these appear to be improved.     6.  Endocrinopathies:  David has a history of growth hormone deficiency and hypothyroidism, which are both likely from his radiation treatment.  He should have regular followup with Endocrinology as directed.  Testosterone remains elevated this year so should discuss at upcoming visit with Dr. Caruso.     7.  Risk for secondary neoplasms/vascular changes:  David has a history of radiation, which does put him at increased risk for secondary neoplasms.  I counseled him that should he notice any changes in his cognitive status, any new lumps, bumps, rashes that they should be promptly evaluated given his history of radiation. MRI in 2023 was stable and continues to have radiation induced cavernomas.   He will have an MRI done in 24 months to evaluate post his  whole brain radiation.    I did also discuss the risk for stroke post radiation and reviewed symptoms.  Mom does have a history of CVA and dad is unsure if she has any underlying disorder that caused this.  Will send a referral to local derm practice at [facility] [phone].    8.  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  He currently has no back pain or concerns.     9.  Risk for kidney toxicity:  David has a history of cisplatin and cyclophosphamide treatment, which can cause kidney and bladder dysfunction.  David's baseline CMP and UA were within normal limits.  We recommend that he have yearly assessments of his blood pressure and urinary history.     10. Risk for cataracts:  David has a history of whole brain radiation so he has a risk for cataracts.  He should have regular eye exams completed.  David will make an appt in the near future.  No cataracts noted on exam today.    11. Immune:  Vaccine Health: You can now receive any vaccines that your primary care provider (PCP) recommends, there are no restrictions. Please confirm with your PCP that your vaccines are up to date, including for HPV. As a cancer/BMT survivor, the HPV vaccination should be a 3-dose series. We spoke about the vaccine today as we strongly recommend it (if eligible). Flu vaccine given today.    The longitudinal plan of care for the diagnosis(es)/condition(s) as documented were addressed during this visit. Due to the added complexity in care, I will continue to support David in the subsequent management and with ongoing continuity of care.    Total time spent on the following services on the date of the encounter:  Preparing to see patient, chart review, review of outside records, Ordering medications, test, procedures, chemotherapy, Referring or communicating with other healthcare professionals, Interpretation  of labs, imaging and other tests, Performing a medically appropriate examination , Counseling and educating the patient/family/caregiver , Documenting clinical information in the electronic or other health record , Communicating results to the patient/family/caregiver , Care coordination , and Total time spent: 65 minutes      SURVIVOR CARE PLAN  Treatment History   Diagnosis: medulloblastoma (his posterior fossa)  Date of Diagnosis: 02/07/2008 (7 years of age)  Date Therapy Completed: 04/2008    Chemo:  1.  Vincristine intravenously.   2.  Cis-retinoic acid orally.   3.  Cisplatin intravenously with cumulative dose of 394 mg/meter squared.   4.  Cyclophosphamide intravenously with a cumulative dose of 12 grams/meter squared.      Radiation:  1.  Whole brain radiation which started on 03/04/2008 and was completed on 03/31/2008 in 20 fractions, for a total dose of 3600 centigray.   2.  Spine radiation which started on 03/04/2008 was completed on 03/31/2008 in 20 fractions, for a total dose of 3600 centigray.   3.  Posterior fossa boost, which started on 04/01/2008 and was completed on 04/15/2008 in 11 fractions, for a total of 1972 centigray.   Total dose to posterior fossa is 5572 centigray.     Immunotherapy: None    Bone Marrow Transplant: None    Surgeries: Suboccipital craniotomy and near total tumor resection on 02/07/2008 with Dr. Wilber Valenzuela at the [facility].       Late Effects  1.  Severe bilateral sensorineural hearing loss diagnosed on 08/21/2008- wears hearing aids.   2.  Growth deceleration found on 09/22/2009.   3.  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   4.  Lower extremity weakness diagnosed on 11/03/2009, for which he started physical therapy.   5.  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   6.  Problems with executive functioning diagnosed on 10/25/2010.   7.  Slow processing speed diagnosed on 10/25/2010.   8.  Problems with fine motor skills diagnosed on  10/25/2010.   9.  Reduced spinal growth diagnosed on 07/24/2012.   10.  Problems with sustained attention diagnosed on 08/17/2012.   11.  Problems with emotional lability.   12.  Depression which was diagnosed on 12/03/2014.   13.  Borderline heart issues (EF decreased to 52%- 9/2015; improved to 58% in 2021).  14. 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  Risk of recurrence:  David is currently 15 years from the end of treatment for his medulloblastoma.  Given the distance from the diagnosis of his primary malignancy, the likelihood of recurrence continues to decline. Now that he is >10 years off therapy we can extend his MRI to 2 years from now.  Currently we are monitoring his radiation changes.  Last MRI was stable.   Will plan to repeat the MRI again in 2025 (ordered on 9/27/2024 to [facility] in/around 9/2025)    2.  Psychosocial effects:  David had previous neuropsych testing and was found to have problems with sustained attention, processing speed and some motor skills.  He had accommodations in college but has chosen to take a break from there and work.  If he notices any significant changes in cognition, we would recommend repeat testing.     3.  Risk for cardiac toxicity:  David has a history of spine radiation, which puts him at risk for developing cardiomyopathy and valvular dysfunction.  We recommend he have an echocardiogram every 5 years.  Baseline study in 2015 showed decreased LVEF to 47% in 4 chamber and 52% biplane; also small PFO with L to R shunting.  He saw peds cardiology in 2015 but family would like to follow up locally.  He had a local echocardiogram in 2019 that was completely normal (EF 60%).  Echo repeated in 2024 with EF 66%. Plan was to repeat in 2 years, hopefully in [place], closer to home.     There is also increasing evidence regarding metabolic syndrome in cancer survivors.  We would recommend that David have fasting  lipids followed at least every 2-3 years.  Last done in 2015 but he wasn't fasting today.  He should try to have it completed with primary care. Hgb A1C today is normal.  Lipid panel (cholesterol) is elevated while not fasting so will repeat next year when fasting.  Discussed diet and exercise changes.    4.  Hearing loss:  David has a history of sensorineural hearing loss likely related to his posterior fossa radiation and cisplatin treatment.  He has hearing aids now and should continue his exams locally.  Lasst done 5/2023.     5.  Risk for peripheral neuropathy:  David has a history of vincristine exposure, which does put him at increased risk for peripheral motor neuropathy.  He does have some previous issues with fine motor skills, but these appear to be improved.     6.  Endocrinopathies:  David has a history of growth hormone deficiency and hypothyroidism, which are both likely from his radiation treatment.  He should have regular followup with Endocrinology as directed.  Testosterone remains elevated this year so should discuss at upcoming visit with Dr. Caruso.     7.  Risk for secondary neoplasms/vascular changes:  David has a history of radiation, which does put him at increased risk for secondary neoplasms.  I counseled him that should he notice any changes in his cognitive status, any new lumps, bumps, rashes that they should be promptly evaluated given his history of radiation. MRI in 2023 was stable and continues to have radiation induced cavernomas.   He will have an MRI done in 24 months to evaluate post his whole brain radiation.    I did also discuss the risk for stroke post radiation and reviewed symptoms.  Mom does have a history of CVA and dad is unsure if she has any underlying disorder that caused this.  Will send a referral to local derm practice at [facility] [phone].    8.  Musculoskeletal:  David has some musculoskeletal hypoplasia in the way of his microcephaly as  well as reduced spine growth likely from his radiation.  He currently has no back pain or concerns.     9.  Risk for kidney toxicity:  David has a history of cisplatin and cyclophosphamide treatment, which can cause kidney and bladder dysfunction.  David's baseline CMP and UA were within normal limits.  We recommend that he have yearly assessments of his blood pressure and urinary history.     10. Risk for cataracts:  David has a history of whole brain radiation so he has a risk for cataracts.  He should have regular eye exams completed.  David will make an appt in the near future.  No cataracts noted on exam today.    11. Immune:  Vaccine Health: You can now receive any vaccines that your primary care provider (PCP) recommends, there are no restrictions. Please confirm with your PCP that your vaccines are up to date, including for HPV. As a cancer/BMT survivor, the HPV vaccination should be a 3-dose series. We spoke about the vaccine today as we strongly recommend it (if eligible). Flu vaccine given today.

## 2024-09-17 NOTE — LETTER
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  Vincristine intravenously.   2.  Cis-retinoic acid orally.   3.  Cisplatin intravenously with cumulative dose of 394 mg/meter squared.   4.  Cyclophosphamide intravenously with a cumulative dose of 12 grams/meter squared.      Radiation:  1.  Whole brain radiation which started on 03/04/2008 and was completed on 03/31/2008 in 20 fractions, for a total dose of 3600 centigray.   2.  Spine radiation which started on 03/04/2008 was completed on 03/31/2008 in 20 fractions, for a total dose of 3600 centigray.   3.  Posterior fossa boost, which started on 04/01/2008 and was completed on 04/15/2008 in 11 fractions, for a total of 1972 centigray.   Total dose to posterior fossa is 5572 centigray.     Immunotherapy: None    Bone Marrow Transplant: None    Surgeries: Suboccipital craniotomy and near total tumor resection on 02/07/2008 with Dr. Wilber Valenzuela at the [facility].       Late Effects  1.  Severe bilateral sensorineural hearing loss diagnosed on 08/21/2008- wears hearing aids.   2.  Growth deceleration found on 09/22/2009.   3.  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   4.  Lower extremity weakness diagnosed on 11/03/2009, for which he started physical therapy.   5.  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   6.  Problems with executive functioning diagnosed on 10/25/2010.   7.  Slow processing speed diagnosed on 10/25/2010.   8.  Problems with fine motor skills diagnosed on 10/25/2010.   9.  Reduced spinal growth diagnosed on 07/24/2012.   10.  Problems with sustained attention diagnosed on 08/17/2012.   11.  Problems with emotional lability.   12.  Depression which was diagnosed on 12/03/2014.   13.  Borderline heart issues (EF  decreased to 52%- 9/2015; improved to 58% in 2021).  14. 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  Risk of recurrence:  David is currently 15 years from the end of treatment for his medulloblastoma.  Given the distance from the diagnosis of his primary malignancy, the likelihood of recurrence continues to decline. Now that he is >10 years off therapy we can extend his MRI to 2 years from now.  Currently we are monitoring his radiation changes.  Last MRI was stable.   Will plan to repeat the MRI again in 2025 (ordered on 9/27/2024 to [facility] in/around 9/2025)    2.  Psychosocial effects:  David had previous neuropsych testing and was found to have problems with sustained attention, processing speed and some motor skills.  He had accommodations in college but has chosen to take a break from there and work.  If he notices any significant changes in cognition, we would recommend repeat testing.     3.  Risk for cardiac toxicity:  David has a history of spine radiation, which puts him at risk for developing cardiomyopathy and valvular dysfunction.  We recommend he have an echocardiogram every 5 years.  Baseline study in 2015 showed decreased LVEF to 47% in 4 chamber and 52% biplane; also small PFO with L to R shunting.  He saw peds cardiology in 2015 but family would like to follow up locally.  He had a local echocardiogram in 2019 that was completely normal (EF 60%).  Echo repeated in 2024 with EF 66%. Plan was to repeat in 2 years, hopefully in [place], closer to home.     There is also increasing evidence regarding metabolic syndrome in cancer survivors.  We would recommend that David have fasting lipids followed at least every 2-3 years.  Last done in 2015 but he wasn't fasting today.  He should try to have it completed with primary care. Hgb A1C today is normal.  Lipid panel (cholesterol) is elevated while not fasting so will repeat next year when  fasting.  Discussed diet and exercise changes.    4.  Hearing loss:  David has a history of sensorineural hearing loss likely related to his posterior fossa radiation and cisplatin treatment.  He has hearing aids now and should continue his exams locally.  Lasst done 5/2023.     5.  Risk for peripheral neuropathy:  David has a history of vincristine exposure, which does put him at increased risk for peripheral motor neuropathy.  He does have some previous issues with fine motor skills, but these appear to be improved.     6.  Endocrinopathies:  David has a history of growth hormone deficiency and hypothyroidism, which are both likely from his radiation treatment.  He should have regular followup with Endocrinology as directed.  Testosterone remains elevated this year so should discuss at upcoming visit with Dr. Caruso.     7.  Risk for secondary neoplasms/vascular changes:  David has a history of radiation, which does put him at increased risk for secondary neoplasms.  I counseled him that should he notice any changes in his cognitive status, any new lumps, bumps, rashes that they should be promptly evaluated given his history of radiation. MRI in 2023 was stable and continues to have radiation induced cavernomas.   He will have an MRI done in 24 months to evaluate post his whole brain radiation.    I did also discuss the risk for stroke post radiation and reviewed symptoms.  Mom does have a history of CVA and dad is unsure if she has any underlying disorder that caused this.  Will send a referral to local derm practice at [facility] [phone].    8.  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  He currently has no back pain or concerns.     9.  Risk for kidney toxicity:  David has a history of cisplatin and cyclophosphamide treatment, which can cause kidney and bladder dysfunction.  David's  baseline CMP and UA were within normal limits.  We recommend that he have yearly assessments of his blood pressure and urinary history.     10. Risk for cataracts:  David has a history of whole brain radiation so he has a risk for cataracts.  He should have regular eye exams completed.  David will make an appt in the near future.  No cataracts noted on exam today.    11. Immune:  Vaccine Health: You can now receive any vaccines that your primary care provider (PCP) recommends, there are no restrictions. Please confirm with your PCP that your vaccines are up to date, including for HPV. As a cancer/BMT survivor, the HPV vaccination should be a 3-dose series. We spoke about the vaccine today as we strongly recommend it (if eligible). Flu vaccine given today.                                LABORATORY STUDIES and IMAGES:   Latest Reference Range & Units 09/17/24 14:57   Cholesterol <200 mg/dL 226 (H)   Patient Fasting?  No   HDL Cholesterol >=40 mg/dL 38 (L)   Hemoglobin A1C <5.7 % 5.6   LDL Cholesterol Calculated <100 mg/dL 147 (H)   Non HDL Cholesterol <130 mg/dL 188 (H)   Triglycerides <150 mg/dL 204 (H)   Vitamin D, Total (25-Hydroxy) 20 - 50 ng/mL 22   (H): Data is abnormally high  (L): Data is abnormally low    ECHO (9/17/2024)  Normal echocardiogram. There is normal appearance and motion of the tricuspid,  mitral, pulmonary and aortic valves. No atrial, ventricular or arterial level  shunting. The left and right ventricles have normal chamber size, wall  thickness, and systolic function. The calculated biplane left ventricular  ejection fraction is 66%. No pericardial effusion.  No significant change from last echocardiogram.

## 2024-09-17 NOTE — LETTER
9/17/2024      RE: David Beaulieu  [address]  [place] [address]       medulloblastoma that was diagnosed on 02/07/2008 at 7 years of age.  His tumor was located in his posterior fossa.  He was treated at the [facility] under the direction of Dr. Grant Rosenbaum.  David had initial surgical resection of his tumor which was as follows:   1.  Suboccipital craniotomy and near total tumor resection on 02/07/2008 with Dr. Wilber Valenzuela at the [facility].        David was treated as per the [facility] protocol [id number] and received the following chemotherapy:   1.  Vincristine intravenously.   2.  Cis-retinoic acid orally.   3.  Cisplatin intravenously with cumulative dose of 394 mg/meter squared.   4.  Cyclophosphamide intravenously with a cumulative dose of 12 grams/meter squared.      David also received radiation therapy as part of his treatment which is as follows:   1.  Whole brain radiation which started on 03/04/2008 and was completed on 03/31/2008 in 20 fractions, for a total dose of 3600 centigray.   2.  Spine radiation which started on 03/04/2008 was completed on 03/31/2008 in 20 fractions, for a total dose of 3600 centigray.   3.  Posterior fossa boost, which started on 04/01/2008 and was completed on 04/15/2008 in 11 fractions, for a total of 1972 centigray.   Total dose to posterior fossa is 5572 centigray.      LATE EFFECTS  1.  Severe bilateral sensorineural hearing loss diagnosed on 08/21/2008- wears hearing aids.   2.  Growth deceleration found on 09/22/2009.   3.  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   4.  Lower extremity weakness diagnosed on 11/03/2009, for which he started physical therapy.   5.  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   6.  Problems with executive functioning diagnosed on 10/25/2010.   7.  Slow processing speed diagnosed on 10/25/2010.   8.  Problems with fine motor skills diagnosed on 10/25/2010.   9.  Reduced spinal growth  diagnosed on 07/24/2012.   10.  Problems with sustained attention diagnosed on 08/17/2012.   11.  Problems with emotional lability.   12.  Depression which was diagnosed on 12/03/2014.   13.  Borderline EF decreased to 52%- 9/2015; improved to 58% in 2021.  14. 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  Risk of recurrence:  David is currently 15 years from the end of treatment for his medulloblastoma.  Given the distance from the diagnosis of his primary malignancy, the likelihood of recurrence continues to decline. Now that he is >10 years off therapy we can extend his MRI to 2 years from now.  Currently we are monitoring his radiation changes.  Last MRI was stable.   Will plan to repeat the MRI again in 2025.    2.  Psychosocial effects:  David had previous neuropsych testing and was found to have problems with sustained attention, processing speed and some motor skills.  He had accommodations in college but has chosen to take a break from there and work.  If he notices any significant changes in cognition, we would recommend repeat testing.     3.  Risk for cardiac toxicity:  David has a history of spine radiation, which puts him at risk for developing cardiomyopathy and valvular dysfunction.  We recommend he have an echocardiogram every 5 years.  Baseline study in 2015 showed decreased LVEF to 47% in 4 chamber and 52% biplane; also small PFO with L to R shunting.  He saw peds cardiology in 2015 but family would like to follow up locally.  He had a local echocardiogram in 2019 that was completely normal (EF 60%).  Echo repeated in 2024 with EF 66%. Plan was to repeat in 2 years, hopefully in [place], closer to home.     There is also increasing evidence regarding metabolic syndrome in cancer survivors.  We would recommend that David have fasting lipids followed at least every 2-3 years.  Last done in 2015 but he wasn't fasting today.  He should try  to have it completed with primary care. Hgb A1C today is normal.  Lipid panel (cholesterol) is elevated whiel not fasting so will repeat next year when fasting.  Discussed diet and exercise changes.    4.  Hearing loss:  David has a history of sensorineural hearing loss likely related to his posterior fossa radiation and cisplatin treatment.  He has hearing aids now and should continue his exams locally.  Lasst done 5/2023.     5.  Risk for peripheral neuropathy:  David has a history of vincristine exposure, which does put him at increased risk for peripheral motor neuropathy.  He does have some previous issues with fine motor skills, but these appear to be improved.     6.  Endocrinopathies:  David has a history of growth hormone deficiency and hypothyroidism, which are both likely from his radiation treatment.  He should have regular followup with Endocrinology as directed.  Testosterone remains elevated this year so should discuss at upcoming visit with Dr. Caruso.     7.  Risk for secondary neoplasms/vascular changes:  David has a history of radiation, which does put him at increased risk for secondary neoplasms.  I counseled him that should he notice any changes in his cognitive status, any new lumps, bumps, rashes that they should be promptly evaluated given his history of radiation. MRI in 2023 was stable and continues to have radiation induced cavernomas.   He will have an MRI done in 24 months to evaluate post his whole brain radiation.    I did also discuss the risk for stroke post radiation and reviewed symptoms.  Mom does have a history of CVA and dad is unsure if she has any underlying disorder that caused this.  Will send a referral to local derm practice at [facility] [phone].    8.  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  He currently has no back pain or concerns.     9.   Risk for kidney toxicity:  David has a history of cisplatin and cyclophosphamide treatment, which can cause kidney and bladder dysfunction.  David's baseline CMP and UA were within normal limits.  We recommend that he have yearly assessments of his blood pressure and urinary history.     10. Risk for cataracts:  David has a history of whole brain radiation so he has a risk for cataracts.  He should have regular eye exams completed.  David will make an appt in the near future.  No cataracts noted on exam today.    11. Immune:  Vaccine Health: You can now receive any vaccines that your primary care provider (PCP) recommends, there are no restrictions. Please confirm with your PCP that your vaccines are up to date, including for HPV. As a cancer/BMT survivor, the HPV vaccination should be a 3-dose series. We spoke about the vaccine today as we strongly recommend it (if eligible). Flu vaccine given today.      LABORATORY STUDIES AND IMAGES:   Latest Reference Range & Units 09/17/24 14:57   Cholesterol <200 mg/dL 226 (H)   Patient Fasting?  No   HDL Cholesterol >=40 mg/dL 38 (L)   Hemoglobin A1C <5.7 % 5.6   LDL Cholesterol Calculated <100 mg/dL 147 (H)   Non HDL Cholesterol <130 mg/dL 188 (H)   Triglycerides <150 mg/dL 204 (H)   Vitamin D, Total (25-Hydroxy) 20 - 50 ng/mL 22   (H): Data is abnormally high  (L): Data is abnormally low    ECHO (9/17/2024)  Normal echocardiogram. There is normal appearance and motion of the tricuspid,  mitral, pulmonary and aortic valves. No atrial, ventricular or arterial level  shunting. The left and right ventricles have normal chamber size, wall  thickness, and systolic function. The calculated biplane left ventricular  ejection fraction is 66%. No pericardial effusion.  No significant change from last echocardiogram.

## 2024-09-18 ENCOUNTER — VIRTUAL VISIT (OUTPATIENT)
Dept: ENDOCRINOLOGY | Facility: CLINIC | Age: 24
End: 2024-09-18
Payer: COMMERCIAL

## 2024-09-18 DIAGNOSIS — E23.0 HYPOPITUITARISM (H): Primary | ICD-10-CM

## 2024-09-18 LAB
T4 FREE SERPL-MCNC: 1.36 NG/DL (ref 0.9–1.7)
TSH SERPL DL<=0.005 MIU/L-ACNC: 6.37 UIU/ML (ref 0.3–4.2)

## 2024-09-18 PROCEDURE — 99214 OFFICE O/P EST MOD 30 MIN: CPT | Mod: 95 | Performed by: INTERNAL MEDICINE

## 2024-09-18 NOTE — NURSING NOTE
Current patient location: [place]    Is the patient currently in the state of [place]? YES    Visit mode:VIDEO    If the visit is dropped, the patient can be reconnected by: VIDEO VISIT: Text to cell phone:   Telephone Information:   Mobile [phone]       Will anyone else be joining the visit? NO  (If patient encounters technical issues they should call [phone] :[id number])    How would you like to obtain your AVS? MyChart    Are changes needed to the allergy or medication list? No  David reported no changes to e-check in information for visit. LOCO did not review e-check in information again with David due to this.       Are refills needed on medications prescribed by this physician? NO    Rooming Documentation:  Not applicable      Reason for visit: RECHECK    Kia CANELA

## 2024-09-18 NOTE — PROGRESS NOTES
Video-Visit Details    Type of service:  Video Visit    Video Start Time: 3:30 pm  End  3:50 pm    Originating Location (pt. Location): Home    Distant Location (provider location):  [facility]     Platform used for Video Visit: Chronicity                                                                         - Endocrinology Follow up -    Reason for visit/consult: Gross hormone deficiency,  secondary hypothyroidism.    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  Suboccipital craniotomy and near total tumor resection on 02/07/2008 with Dr. Wilber Valenzuela at the [facility].       # Gonado axis  Last testosterone 349 in 2017, almost optimal without testosterone treatment.  Denied low energy or muscle weakness. Also no significant gonadal atrophy. But he has significant thin hair. He totally forgot about gel. he has low bone dentisy in Z score    - continue current testosterone injection 100 mcg once a week     - total testosterone to check.      # Low bone density  Last bone density in 2017, Z score spine -2.2, but 2019 Z score improved to -1.7.    - repeat bone density prior to next visit    - calcium citrate 2 tabs (elemental Ca 630 mg, vitamin D 500IU)       #Growth hormone deficiency  IGF1 in 2021 looks ok 126. 4/2022 IGF1 112 , Z score -1.0, he mentioned occasional joint (knee) pain but we can follow without GH therapy for now      #Secondary hypothyroidism  TSH slight elevated, subclinical hypothryoidism (TSH 4.59) but clinically he is doing great. I talked about medication compliance and this year no change dose of levothyroxine.     TFT loks optimized 9/2022    -Continue current levothyroxine 125 mcg    - RTC with me in 1 year        Total 30  minutes spent on the date of the encounter doing chart review, history and exam, documentation and  further activities as noted above.    Karen Caruso MD  Staff Physician  Endocrinology and Metabolism  [facility]  License: [place] [id number]  Pager: [phone]    Interval History as of 9/18/2024 : Patient has been doing well. . Medication compliance : he injects testosterone by himself and manageable.  . New event includes : good energy, but tired with long hours of work from 3pm to midnight.   Interval History as of 10/4/2023 : Patient has been doing well. Medication compliance excellent. Interval History as of 9/21/2022 : Patient has been doing well. Last seen . Medication compliance : excellent to teststsoren once a week injection and LT4  . New event includes: started a new job, cleaning and lost 15 lb now 135 lb  .  Interval History as of 9/22/2021 : Patient has been doing well.  Medication compliance very good . New event includes: busy at work, long hour, but also daily afternoon fatigue, shaves face 1-2 times a week  .  Interval History as of 9/14/2020 : Patient has been doing well. Last seen 1 year ago. Medication compliance: totally forgot about testosterone gel   . New event includes: able to work but sometimes tired.   .  Interval History as of 9/17/2019 : Patient has been doing well. Last seen 1 year ago. Medication compliance good. New event includes: non significant . Eating wel, stable body weight, good actvity level.   HPI: A 17-year-old male here for transition care from pediatric endocrinologist for his long history of growth hormone deficiency, secondary hypogonadism, secondary hypothyroidism.  Patient came with his father. Last seen by Dr. Higgins in 9/2015.   Patient was diagnosed medulloblastoma at age 7 with a symptom of a headache and vomiting, he underwent brain surgery followed by chemotherapy and radiation therapy.  Patient developed gross hormone deficiency which was confirmed by biochemical test, was on growth hormone injection since end of 3/2010. Growth hormone  stimulation testing 03/17/2010 showed a peak response following clonidine of 3 and arginine of 3.4 consistent with severe growth hormone deficiency. He was started on growth hormone therapy at the end of 03/2010. GH was discontinued in 4/2015. He did not have symptoms such as low energy, myalgia, arthralgia after discontinuing growth hormone injection.  He also has secondary hypothyroidism which he is currently on levothyroxine 125 mcg with good compliance.     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   Energy level: ok    Past Medical/Surgical History:  Past Medical History:   Diagnosis Date    Cancer (H)      No past surgical history on file.    Allergies:  Allergies   Allergen Reactions    Amoxicillin Hives     SUSR       Current Medications   Current Outpatient Medications   Medication Sig Dispense Refill    Acetaminophen (TYLENOL CHILDRENS PO) Take 1 tablet by mouth every 4 hours as needed. CHEW      calcium citrate-vitamin D (CALCIUM CITRATE + D) 315-250 MG-UNIT TABS per tablet Take 2 tablets by mouth daily (Patient not taking: Reported on 10/4/2023) 180 tablet 3    cetirizine (ZYRTEC) 10 MG tablet Take 10 mg by mouth (Patient not taking: Reported on 10/4/2023)      famotidine (PEPCID) 20 MG tablet Take 1 tablet (20 mg) by mouth 2 times daily as needed (heartburn) 60 tablet 0    fluticasone (FLONASE) 50 MCG/ACT nasal spray Spray 2 sprays into both nostrils daily (Patient not taking: Reported on 10/4/2023) 16 g 6    GNP ARTHRITIS PAIN 1 % topical gel  (Patient not taking: Reported on 9/20/2022)      ibuprofen (ADVIL,MOTRIN) 200 MG tablet Take 1 tablet by mouth every 6 hours as needed.      levothyroxine (SYNTHROID/LEVOTHROID) 112 MCG tablet Take 1 tablet (112 mcg) by mouth daily 90 tablet 3    naproxen (NAPROSYN) 500 MG tablet Take 500 mg by mouth as needed (Patient not taking: Reported on 10/4/2023)      omeprazole (PRILOSEC) 20 MG DR capsule  "Take 20 mg by mouth daily      syringe/needle, disp, (LUER LOCK SAFETY SYRINGES) 23G X 1\" 3 ML MISC Use 1 weekly with testosterone injections 12 each 3    Syringe/Needle, Disp, 23G X 1-1/2\" 3 ML MISC Use to draw up testosterone for injection IM. 1x weekly. 12 each 3    testosterone cypionate (DEPOTESTOSTERONE) 200 MG/ML injection Inject 0.5 mLs (100 mg) into the muscle once a week 10 mL 5    testosterone cypionate (DEPOTESTOSTERONE) 200 MG/ML injection Inject 0.5 mLs (100 mg) into the muscle every 7 days 10 mL 5     No current facility-administered medications for this visit.       Family History:  Family History   Problem Relation Age of Onset    Cerebrovascular Disease Mother     Childhood Heart Surgery Father         PDA s/p repair    Hypertension Father    maternal aunt: ovarian cancer, maternal grandmother: lung cancer, maternal grandfather: prostate cancer    Social History:  Social History     Tobacco Use    Smoking status: Never    Smokeless tobacco: Never   Substance Use Topics    Alcohol use: Not on file   lives college campus, studying to become .     ROS:  Full review of systems taken with the help of the intake sheet. Otherwise a complete 14 point review of systems was taken and is negative unless stated in the history above.      Physical Exam:   There were no vitals taken for this visit.    General: well appearing, no acute distress, pleasant and conversant,   Mental Status/neuro: alert and oriented  Skull: microcephalia  Face: symmetrical, normal facial color  Eyes: anicteric, no proptosis or lid lag  Resp: no acute distress        Labs : I reviewed data from epic and extract and summarize the pertinent data here.      Ref. Range 9/30/2015 12:20 9/20/2016 11:52 9/19/2017 12:09 9/18/2018 15:46 9/17/2019 13:45   Testosterone Total Latest Ref Range: 300 - 1,200 ng/dL 308 295 (L) 349     TSH Latest Ref Range: 0.40 - 4.00 mU/L <0.01 (L) 0.02 (L) 0.06 (L) 5.38 (H) 4.59 (H)   T4 Free Latest Ref " Range: 0.76 - 1.46 ng/dL 1.60 (H) 1.46 1.37 1.04 1.14   Ins Growth Factor 1 Latest Ref Range: 137 - 428 ng/ml    232    Lutropin Latest Ref Range: 1.5 - 9.3 IU/L 3.5 2.6 5.9     FSH Latest Ref Range: 0.7 - 10.8 IU/L 9.3 7.9 7.8         MRI Brain: I personally reviewed the original images and agree with the below reports.   Results for orders placed during the hospital encounter of 09/19/17   MRI Brain w & w/o contrast with susceptibility weighted imaging    Narrative  MR BRAIN W/O & W CONTRAST 9/19/2017 11:36 AM    History: Medulloblastoma diagnosed on 02/07/2008 at 7 years of age.  ?Initial surgical resection of his tumor with suboccipital craniotomy  and near total tumor resection on 02/07/2008.  ??  Patient was treated with subsequent chemo therapy and radiation  therapy.    ??  Comparison: Head MRI 9/20/2016, 9/30/2015 and 2/8/2008    Technique: Multiplanar T1-weighted, axial FLAIR, and susceptibility  images were obtained without intravenous contrast. Following  intravenous gadolinium-based contrast administration, axial  T2-weighted, diffusion, and T1-weighted images (in multiple planes)  were obtained.    Contrast dose: 6ml iv gadavist    Findings: Postsurgical changes of suboccipital craniotomy and  resection of fourth ventricle medulloblastoma. Hemosiderin staining  within the surgical cavity. Unchanged appearance of the resection  cavity with accompanying atrophic changes of the cerebellar vermis and  right cerebellar hemisphere likely secondary to a combination of  radiotherapy and surgery. On postcontrast series there is no enhancing  lesion to suggest residual lesion or recurrence. No abnormal  restricted diffusion within the surgical site to suggest tumor  recurrence. On susceptibility weighted imaging, there are stable  scattered punctate foci of susceptibility artifact throughout the  subcortical and periventricular white matter. These may represent  chronic microhemorrhages versus post radiation  cavernomas.    Compared to previous MRI exams, there is no intracranial hemorrhage  mass effect or new enhancing lesion. Ventricular system appears stable  in size. Patent major flow voids. Venous sinuses appear normal.  Orbital structures are grossly unremarkable.    Again noted subtle increased T1 signal on noncontrast T1-weighted  images within the left dentate nucleus which may represent  accumulation of gadolinium over the years.      Impression Impression:  1. Postsurgical changes of posterior fossa medulloblastoma resection  without any evidence of tumor recurrence.    2. Unchanged scattered foci of susceptibility artifact consistent with  either chronic microhemorrhages versus postradiation cavernomas.    3. Unchanged T1 signal in the basal ganglia and left dentate nucleus  which may represent accumulated gadolinium.    I have personally reviewed the examination and initial interpretation  and I agree with the findings.    FREDERICK SOTO MD

## 2024-09-18 NOTE — LETTER
9/18/2024       RE: David Beaulieu  [address]  [place] [address]     Dear Colleague,    Thank you for referring your patient, David Beaulieu, to the [facility] [place] at [facility]. Please see a copy of my visit note below.    Video-Visit Details    Type of service:  Video Visit    Video Start Time: 3:30 pm  End  3:50 pm    Originating Location (pt. Location): Home    Distant Location (provider location):  [facility]     Platform used for Video Visit: [facility]                                                                         - Endocrinology Follow up -    Reason for visit/consult: Gross hormone deficiency,  secondary hypothyroidism.    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  Suboccipital craniotomy and near total tumor resection on 02/07/2008 with Dr. Wilber Valenzuela at the [facility].       # Gonado axis  Last testosterone 349 in 2017, almost optimal without testosterone treatment.  Denied low energy or muscle weakness. Also no significant gonadal atrophy. But he has significant thin hair. He totally forgot about gel. he has low bone dentisy in Z score    - continue current testosterone injection 100 mcg once a week     - total testosterone to check.      # Low bone density  Last bone density in 2017, Z score spine -2.2, but 2019 Z score improved to -1.7.    - repeat bone density prior to next visit    - calcium citrate 2 tabs (elemental Ca 630 mg, vitamin D 500IU)       #Growth hormone deficiency  IGF1 in 2021 looks ok 126. 4/2022 IGF1 112 , Z score -1.0, he mentioned occasional joint (knee) pain but we can follow without GH therapy for now      #Secondary hypothyroidism  TSH slight elevated, subclinical hypothryoidism (TSH 4.59) but clinically he is doing  great. I talked about medication compliance and this year no change dose of levothyroxine.     TFT loks optimized 9/2022    -Continue current levothyroxine 125 mcg    - RTC with me in 1 year        Total 30  minutes spent on the date of the encounter doing chart review, history and exam, documentation and further activities as noted above.    Karen Caruso MD  Staff Physician  Endocrinology and Metabolism  [facility]  License: [place] [id number]  Pager: [phone]    Interval History as of 9/18/2024 : Patient has been doing well. . Medication compliance : he injects testosterone by himself and manageable.  . New event includes : good energy, but tired with long hours of work from 3pm to midnight.   Interval History as of 10/4/2023 : Patient has been doing well. Medication compliance excellent. Interval History as of 9/21/2022 : Patient has been doing well. Last seen . Medication compliance : excellent to teststsoren once a week injection and LT4  . New event includes: started a new job, cleaning and lost 15 lb now 135 lb  .  Interval History as of 9/22/2021 : Patient has been doing well.  Medication compliance very good . New event includes: busy at work, long hour, but also daily afternoon fatigue, shaves face 1-2 times a week  .  Interval History as of 9/14/2020 : Patient has been doing well. Last seen 1 year ago. Medication compliance: totally forgot about testosterone gel   . New event includes: able to work but sometimes tired.   .  Interval History as of 9/17/2019 : Patient has been doing well. Last seen 1 year ago. Medication compliance good. New event includes: non significant . Eating wel, stable body weight, good actvity level.   HPI: A 17-year-old male here for transition care from pediatric endocrinologist for his long history of growth hormone deficiency, secondary hypogonadism, secondary hypothyroidism.  Patient came with his father. Last seen by Dr. Higgins in 9/2015.   Patient was diagnosed  medulloblastoma at age 7 with a symptom of a headache and vomiting, he underwent brain surgery followed by chemotherapy and radiation therapy.  Patient developed gross hormone deficiency which was confirmed by biochemical test, was on growth hormone injection since end of 3/2010. Growth hormone stimulation testing 03/17/2010 showed a peak response following clonidine of 3 and arginine of 3.4 consistent with severe growth hormone deficiency. He was started on growth hormone therapy at the end of 03/2010. GH was discontinued in 4/2015. He did not have symptoms such as low energy, myalgia, arthralgia after discontinuing growth hormone injection.  He also has secondary hypothyroidism which he is currently on levothyroxine 125 mcg with good compliance.     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   Energy level: ok    Past Medical/Surgical History:  Past Medical History:   Diagnosis Date     Cancer (H)      No past surgical history on file.    Allergies:  Allergies   Allergen Reactions     Amoxicillin Hives     SUSR       Current Medications   Current Outpatient Medications   Medication Sig Dispense Refill     Acetaminophen (TYLENOL CHILDRENS PO) Take 1 tablet by mouth every 4 hours as needed. CHEW       calcium citrate-vitamin D (CALCIUM CITRATE + D) 315-250 MG-UNIT TABS per tablet Take 2 tablets by mouth daily (Patient not taking: Reported on 10/4/2023) 180 tablet 3     cetirizine (ZYRTEC) 10 MG tablet Take 10 mg by mouth (Patient not taking: Reported on 10/4/2023)       famotidine (PEPCID) 20 MG tablet Take 1 tablet (20 mg) by mouth 2 times daily as needed (heartburn) 60 tablet 0     fluticasone (FLONASE) 50 MCG/ACT nasal spray Spray 2 sprays into both nostrils daily (Patient not taking: Reported on 10/4/2023) 16 g 6     GNP ARTHRITIS PAIN 1 % topical gel  (Patient not taking: Reported on 9/20/2022)       ibuprofen (ADVIL,MOTRIN) 200 MG tablet Take 1  "tablet by mouth every 6 hours as needed.       levothyroxine (SYNTHROID/LEVOTHROID) 112 MCG tablet Take 1 tablet (112 mcg) by mouth daily 90 tablet 3     naproxen (NAPROSYN) 500 MG tablet Take 500 mg by mouth as needed (Patient not taking: Reported on 10/4/2023)       omeprazole (PRILOSEC) 20 MG DR capsule Take 20 mg by mouth daily       syringe/needle, disp, (LUER LOCK SAFETY SYRINGES) 23G X 1\" 3 ML MISC Use 1 weekly with testosterone injections 12 each 3     Syringe/Needle, Disp, 23G X 1-1/2\" 3 ML MISC Use to draw up testosterone for injection IM. 1x weekly. 12 each 3     testosterone cypionate (DEPOTESTOSTERONE) 200 MG/ML injection Inject 0.5 mLs (100 mg) into the muscle once a week 10 mL 5     testosterone cypionate (DEPOTESTOSTERONE) 200 MG/ML injection Inject 0.5 mLs (100 mg) into the muscle every 7 days 10 mL 5     No current facility-administered medications for this visit.       Family History:  Family History   Problem Relation Age of Onset     Cerebrovascular Disease Mother      Childhood Heart Surgery Father         PDA s/p repair     Hypertension Father    maternal aunt: ovarian cancer, maternal grandmother: lung cancer, maternal grandfather: prostate cancer    Social History:  Social History     Tobacco Use     Smoking status: Never     Smokeless tobacco: Never   Substance Use Topics     Alcohol use: Not on file   lives college campus, studying to become .     ROS:  Full review of systems taken with the help of the intake sheet. Otherwise a complete 14 point review of systems was taken and is negative unless stated in the history above.      Physical Exam:   There were no vitals taken for this visit.    General: well appearing, no acute distress, pleasant and conversant,   Mental Status/neuro: alert and oriented  Skull: microcephalia  Face: symmetrical, normal facial color  Eyes: anicteric, no proptosis or lid lag  Resp: no acute distress        Labs : I reviewed data from epic and extract " and summarize the pertinent data here.      Ref. Range 9/30/2015 12:20 9/20/2016 11:52 9/19/2017 12:09 9/18/2018 15:46 9/17/2019 13:45   Testosterone Total Latest Ref Range: 300 - 1,200 ng/dL 308 295 (L) 349     TSH Latest Ref Range: 0.40 - 4.00 mU/L <0.01 (L) 0.02 (L) 0.06 (L) 5.38 (H) 4.59 (H)   T4 Free Latest Ref Range: 0.76 - 1.46 ng/dL 1.60 (H) 1.46 1.37 1.04 1.14   Ins Growth Factor 1 Latest Ref Range: 137 - 428 ng/ml    232    Lutropin Latest Ref Range: 1.5 - 9.3 IU/L 3.5 2.6 5.9     FSH Latest Ref Range: 0.7 - 10.8 IU/L 9.3 7.9 7.8         MRI Brain: I personally reviewed the original images and agree with the below reports.   Results for orders placed during the hospital encounter of 09/19/17   MRI Brain w & w/o contrast with susceptibility weighted imaging    Narrative  MR BRAIN W/O & W CONTRAST 9/19/2017 11:36 AM    History: Medulloblastoma diagnosed on 02/07/2008 at 7 years of age.  ?Initial surgical resection of his tumor with suboccipital craniotomy  and near total tumor resection on 02/07/2008.  ??  Patient was treated with subsequent chemo therapy and radiation  therapy.    ??  Comparison: Head MRI 9/20/2016, 9/30/2015 and 2/8/2008    Technique: Multiplanar T1-weighted, axial FLAIR, and susceptibility  images were obtained without intravenous contrast. Following  intravenous gadolinium-based contrast administration, axial  T2-weighted, diffusion, and T1-weighted images (in multiple planes)  were obtained.    Contrast dose: 6ml iv gadavist    Findings: Postsurgical changes of suboccipital craniotomy and  resection of fourth ventricle medulloblastoma. Hemosiderin staining  within the surgical cavity. Unchanged appearance of the resection  cavity with accompanying atrophic changes of the cerebellar vermis and  right cerebellar hemisphere likely secondary to a combination of  radiotherapy and surgery. On postcontrast series there is no enhancing  lesion to suggest residual lesion or recurrence. No  abnormal  restricted diffusion within the surgical site to suggest tumor  recurrence. On susceptibility weighted imaging, there are stable  scattered punctate foci of susceptibility artifact throughout the  subcortical and periventricular white matter. These may represent  chronic microhemorrhages versus post radiation cavernomas.    Compared to previous MRI exams, there is no intracranial hemorrhage  mass effect or new enhancing lesion. Ventricular system appears stable  in size. Patent major flow voids. Venous sinuses appear normal.  Orbital structures are grossly unremarkable.    Again noted subtle increased T1 signal on noncontrast T1-weighted  images within the left dentate nucleus which may represent  accumulation of gadolinium over the years.      Impression Impression:  1. Postsurgical changes of posterior fossa medulloblastoma resection  without any evidence of tumor recurrence.    2. Unchanged scattered foci of susceptibility artifact consistent with  either chronic microhemorrhages versus postradiation cavernomas.    3. Unchanged T1 signal in the basal ganglia and left dentate nucleus  which may represent accumulated gadolinium.    I have personally reviewed the examination and initial interpretation  and I agree with the findings.    FREDERICK SOTO MD           Again, thank you for allowing me to participate in the care of your patient.      Sincerely,    Karen Caruso MD

## 2024-09-25 ENCOUNTER — MYC MEDICAL ADVICE (OUTPATIENT)
Dept: ENDOCRINOLOGY | Facility: CLINIC | Age: 24
End: 2024-09-25
Payer: COMMERCIAL

## 2024-10-18 ENCOUNTER — TELEPHONE (OUTPATIENT)
Dept: PEDIATRIC HEMATOLOGY/ONCOLOGY | Facility: CLINIC | Age: 24
End: 2024-10-18
Payer: COMMERCIAL

## 2024-10-18 NOTE — TELEPHONE ENCOUNTER
Spoke with patient via phone today to discuss his follow up care with Dermatology.  A faxed referral to [facility] was sent today.  Patient is aware of referral and was advised to call to schedule his first appointment.    [facility]  Fax [phone]   [phone]

## 2024-11-03 ENCOUNTER — MYC REFILL (OUTPATIENT)
Dept: ENDOCRINOLOGY | Facility: CLINIC | Age: 24
End: 2024-11-03
Payer: COMMERCIAL

## 2024-11-03 DIAGNOSIS — E23.0 HYPOPITUITARISM (H): ICD-10-CM

## 2024-11-03 DIAGNOSIS — E29.1 HYPOGONADISM MALE: ICD-10-CM

## 2024-11-03 DIAGNOSIS — E03.8 SECONDARY HYPOTHYROIDISM: ICD-10-CM

## 2024-11-05 RX ORDER — LEVOTHYROXINE SODIUM 112 UG/1
112 TABLET ORAL DAILY
Qty: 90 TABLET | Refills: 3 | Status: SHIPPED | OUTPATIENT
Start: 2024-11-05

## 2024-11-05 RX ORDER — TESTOSTERONE CYPIONATE 200 MG/ML
100 INJECTION, SOLUTION INTRAMUSCULAR WEEKLY
Qty: 10 ML | Refills: 5 | Status: SHIPPED | OUTPATIENT
Start: 2024-11-05

## 2024-11-14 DIAGNOSIS — E29.1 HYPOGONADISM MALE: Primary | ICD-10-CM

## 2024-11-14 RX ORDER — TESTOSTERONE 1.62 MG/G
1 GEL TRANSDERMAL DAILY
Qty: 75 G | Refills: 5 | Status: SHIPPED | OUTPATIENT
Start: 2024-11-14

## 2024-11-18 ENCOUNTER — TELEPHONE (OUTPATIENT)
Dept: ENDOCRINOLOGY | Facility: CLINIC | Age: 24
End: 2024-11-18
Payer: COMMERCIAL

## 2024-11-18 DIAGNOSIS — E23.0 HYPOPITUITARISM (H): ICD-10-CM

## 2024-11-18 DIAGNOSIS — E29.1 HYPOGONADISM MALE: ICD-10-CM

## 2024-11-18 RX ORDER — TESTOSTERONE 1.62 MG/G
2 GEL TRANSDERMAL DAILY
Qty: 75 G | Refills: 5 | Status: SHIPPED | OUTPATIENT
Start: 2024-11-18

## 2024-11-18 NOTE — TELEPHONE ENCOUNTER
I see the PA went in but Dr Caruso changed it to 2 pumps daily today . Any chance the PA can be updated for correct dosing ? Mackenzie Huber RN on 11/18/2024 at 3:22 PM

## 2024-11-18 NOTE — TELEPHONE ENCOUNTER
Retail Pharmacy Prior Authorization Team   Phone: [phone]      PA Initiation    Medication: TESTOSTERONE 20.25 MG/ACT (1.62%) TD GEL  Insurance Company: [employer] - Phone [phone] Fax [phone]  Pharmacy Filling the Rx: [id number] - [place], [place] - [address]  Filling Pharmacy Phone: [phone]  Filling Pharmacy Fax: [phone]  Start Date: 11/18/2024

## 2024-11-18 NOTE — TELEPHONE ENCOUNTER
Retail Pharmacy Prior Authorization Team   Phone: [phone]      PA Initiation    Medication: TESTOSTERONE 20.25 MG/ACT (1.62%) TD GEL  Insurance Company: [place] Medicaid ([facility]) - Phone [phone] Fax [phone]  Pharmacy Filling the Rx: [id number] [place] [address]  Filling Pharmacy Phone: [phone]  Filling Pharmacy Fax:    Start Date: 11/18/2024

## 2024-11-18 NOTE — TELEPHONE ENCOUNTER
Prior Authorization Specialty Medication Request    Medication/Dose: testosterone Androgel 1.62 % 1 pump daily   Diagnosis and ICD code (if different than what is on RX):  E29.1 Hypogonadisms   New/renewal/insurance change PA/secondary ins. PA: renewal of testosterone but different med  Previously Tried and Failed:  has been doing testosterone cypionate self injections which insurance no longer covers   Which is why changing to gel     Important Lab Values: last Testosterone  9/23/24 668 on Testosterone injections 240-950 is the normal ranges.   Rationale: Self inj not covered changing to generic Testerone Androgel gel 1 pump daily.  He has been out since injections denied     Insurance   Primary: [employer]   Insurance ID:  [id number]      Pharmacy Information (if different than what is on RX)  Name:  [facility]  Phone:  [phone]  Fax:[phone]    Clinic Information  Preferred routing pool for dept communication: Telephone call   He's  having anxiety off it so please do urgent Mackenzie Huber RN on 11/18/2024 at 10:48 AM

## 2024-11-19 ENCOUNTER — TELEPHONE (OUTPATIENT)
Dept: ENDOCRINOLOGY | Facility: CLINIC | Age: 24
End: 2024-11-19
Payer: COMMERCIAL

## 2024-11-19 PROBLEM — E29.1 HYPOGONADISM MALE: Status: ACTIVE | Noted: 2024-11-19

## 2024-11-19 NOTE — TELEPHONE ENCOUNTER
"Retail Pharmacy Prior Authorization Team   Phone: [phone]      PRIOR AUTHORIZATION DENIED    Medication: TESTOSTERONE 20.25 MG/ACT (1.62%) TD GEL  Insurance Company: [email or web address] - Phone [phone] Fax [phone]  Denial Date: 11/19/2024  Denial Reason(s):       Appeal Information: To send an appeal to the patient's insurance, please provide a letter of medical necessity for the requested medication that was denied.  Please use the denial rationale from the patient's insurance to write the letter.  Once it is completed, please have it available under the \"letters tab\" in the patient's chart and route directly back to me: GRACIE GE and I can send the appeal to the patient's insurance.     Patient Notified: No. The [facility] PA Team does not notify of the denial outcomes as the patient often will ask what their provider will prescribe in place of the denied medication, or additional information in regards to other therapies they can take in place of the denied medication.  This is not something we can advise on in our department.    "

## 2024-11-19 NOTE — TELEPHONE ENCOUNTER
Retail Pharmacy Prior Authorization Team   Phone: [phone]      Provider has changed directs on Testosterone 20.25 from 1 pump to 2 pumps.  A new PA was sent for the new directions.      Old SIG:      New SIG:

## 2024-11-19 NOTE — TELEPHONE ENCOUNTER
Retail Pharmacy Prior Authorization Team   Phone: [phone]      Medication Appeal Initiation    Medication: TESTOSTERONE 20.25 MG/ACT (1.62%) TD GEL  Appeal Start Date:  11/19/2024  Insurance Company:   Insurance Phone:   Insurance Fax:   Comments:   Faxed lmn, original denial letter, labs to [place] Medicaid request appeal.  Did request urgent processing on this appeal.

## 2024-11-19 NOTE — TELEPHONE ENCOUNTER
Please send appeal letter from Dr Caruso for  testosterone Androgel pump 1.62 % marked urgent he is out of IM injections Mackenzie Huber RN on 11/19/2024 at 2:59 PM

## 2024-11-20 NOTE — TELEPHONE ENCOUNTER
MEDICATION APPEAL APPROVED    Medication: TESTOSTERONE 20.25 MG/ACT (1.62%) TD GEL  Authorization Effective Date:    Authorization Expiration Date:    Approved Dose/Quantity:   Reference #:     Appeal Insurance Company:   Filling Pharmacy: [id number] - [place], [place] - [address] [address] [facility]  Patient Notified: yes  Comments:

## 2024-11-20 NOTE — TELEPHONE ENCOUNTER
Documented approval on behalf of Bernie HUSAIN today 11/20/24    Pharmacy has been notified via Fax and Patient has been notified via chart msg.

## 2024-12-02 ENCOUNTER — TRANSFERRED RECORDS (OUTPATIENT)
Dept: HEALTH INFORMATION MANAGEMENT | Facility: CLINIC | Age: 24
End: 2024-12-02
Payer: COMMERCIAL

## 2024-12-16 DIAGNOSIS — E29.1 HYPOGONADISM MALE: ICD-10-CM

## 2024-12-16 RX ORDER — TESTOSTERONE 1.62 MG/G
2 GEL TRANSDERMAL DAILY
Qty: 75 G | Refills: 5 | Status: SHIPPED | OUTPATIENT
Start: 2024-12-16

## 2025-01-14 DIAGNOSIS — E29.1 HYPOGONADISM MALE: ICD-10-CM

## 2025-01-15 RX ORDER — TESTOSTERONE 1.62 MG/G
2 GEL TRANSDERMAL DAILY
Qty: 75 G | Refills: 5 | Status: SHIPPED | OUTPATIENT
Start: 2025-01-15

## 2025-01-17 ENCOUNTER — TELEPHONE (OUTPATIENT)
Dept: ENDOCRINOLOGY | Facility: CLINIC | Age: 25
End: 2025-01-17
Payer: COMMERCIAL

## 2025-01-17 NOTE — TELEPHONE ENCOUNTER
Prior Authorization Retail Medication Request    Medication/Dose: Testosterone. Androgel 1.62% pump.  Place 2 Pump (40.5 mg) onto the skin daily. Apply from dispenser to clean, dry, intact skin of the upper arms and shoulders. - Transdermal      Diagnosis and ICD code (if different than what is on RX):  E29.1    Previously Tried and Failed:    Rationale:  Hypogonadism male     Insurance   Primary:   Insurance ID:      Secondary (if applicable):  Insurance ID:      Pharmacy Information (if different than what is on RX)  Name:    Phone:    Fax:    Clinic Information  Preferred routing pool for dept communication:

## 2025-01-19 ENCOUNTER — MYC REFILL (OUTPATIENT)
Dept: ENDOCRINOLOGY | Facility: CLINIC | Age: 25
End: 2025-01-19
Payer: COMMERCIAL

## 2025-01-19 DIAGNOSIS — C71.6 MEDULLOBLASTOMA (H): Primary | ICD-10-CM

## 2025-01-19 DIAGNOSIS — E23.0 HYPOPITUITARISM: ICD-10-CM

## 2025-01-19 DIAGNOSIS — Z85.841 PERSONAL HISTORY OF MALIGNANT NEOPLASM OF BRAIN: ICD-10-CM

## 2025-01-19 DIAGNOSIS — E29.1 HYPOGONADISM MALE: ICD-10-CM

## 2025-01-20 RX ORDER — TESTOSTERONE 1.62 MG/G
2 GEL TRANSDERMAL DAILY
Qty: 75 G | Refills: 5 | Status: SHIPPED | OUTPATIENT
Start: 2025-01-20

## 2025-01-20 NOTE — TELEPHONE ENCOUNTER
Retail Pharmacy Prior Authorization Team   Phone: [phone]    PA Initiation    Medication: TESTOSTERONE 20.25 MG/ACT (1.62%) TD GEL  Insurance Company: [employer] - Phone [phone] Fax [phone]  Pharmacy Filling the Rx: [id number] [place], [place] - [address] [address] [facility] [address]  Filling Pharmacy Phone: [phone]  Filling Pharmacy Fax:    Start Date: 1/20/2025

## 2025-01-21 ENCOUNTER — TELEPHONE (OUTPATIENT)
Dept: ENDOCRINOLOGY | Facility: CLINIC | Age: 25
End: 2025-01-21
Payer: COMMERCIAL

## 2025-01-21 NOTE — TELEPHONE ENCOUNTER
Left Voicemail (1st Attempt) and Sent Mychart (1st Attempt) for the patient to call back and schedule the following:    Appointment type: RETURN PITUITARY  Provider: Karen Caruso MD  Return date: next available  Specialty phone number: [phone]  Additional appointment(s) needed: Labs 1wk prior  Additonal Notes: LVM, MyCx1. Has been scheduled incorrectly as KENYATTA, should be RPI for better availability.    Lorena Higgins, RN to Karen Caruso MD  Clinic [facility]   DM      1/20/25  9:08 AM  Dr Caruso:  Androgen Agents [id number]/19/2025 12:58 AM  Protocol Details ALT on file within past 12 mos   HCT less than 54% on file within past 12 mos   Serum Testosterone on file within past 12 mos   Refills for this classification require provider review   AST on file within past 12 mos    CCs: please help schedule follow up with Dr Caruso, First available with lab draw 1 week prior.    Appts available in solutions.    Maria Elena Johnson on 1/21/2025 at 10:23 AM

## 2025-01-21 NOTE — TELEPHONE ENCOUNTER
Retail Pharmacy Prior Authorization Team   Phone: [phone]    Prior Authorization Approval    Medication: TESTOSTERONE 20.25 MG/ACT (1.62%) TD GEL  Authorization Effective Date: 1/1/2025  Authorization Expiration Date: 1/20/2026  Insurance Company: [email or web address] - Phone [phone] Fax [phone]  Which Pharmacy is filling the prescription: [id number] [address] [facility]  Pharmacy Notified: YES  Patient Notified: **Instructed pharmacy to notify patient when script is ready to /ship.**

## 2025-06-03 ENCOUNTER — TELEPHONE (OUTPATIENT)
Dept: PEDIATRIC HEMATOLOGY/ONCOLOGY | Facility: CLINIC | Age: 25
End: 2025-06-03
Payer: COMMERCIAL

## 2025-06-03 NOTE — TELEPHONE ENCOUNTER
Writer was able to reach David by phone today to follow up on his medical events that transpired over the weekend.  David confirms his symptoms of report, testing and the follow up plan is congruent to what the ER notes describe.  David states he understands he is scheduled for an Echo on June 16th and was advised to repeat an MRI in 1 year.  Informed David that Erica Knight NP has reviewed the ER notes and images/reports and has no concerns from an Oncology standpoint.  Rafaelsammyalf denies having any Neurology follow up or PCP follow up arranged.  He confirms that Dr Keyes is his PCP and understands the instructions from this writer to arrange a PCP appointment as post hospital discharge follow up and to check in further on his mental health.  Offered praise to David for keeping his Survivor Care team informed of medical updates and encouraged him to continue this practice.  Further discussed with David that his Survivor Care team advises him to have a local PCP that is similarly looped in on his medical updates and needs given the distance he is from his Survivorship Care team in [place].   No further questions or concerns at this time.    Darlene Marroquin RN, BSN  Childhood Cancer Survivorship Program Nurse  [phone]

## 2025-08-15 ENCOUNTER — MYC REFILL (OUTPATIENT)
Dept: ENDOCRINOLOGY | Facility: CLINIC | Age: 25
End: 2025-08-15
Payer: COMMERCIAL

## 2025-08-15 DIAGNOSIS — E23.0 HYPOPITUITARISM: ICD-10-CM

## 2025-08-15 DIAGNOSIS — C71.6 MEDULLOBLASTOMA (H): ICD-10-CM

## 2025-08-15 DIAGNOSIS — Z85.841 PERSONAL HISTORY OF MALIGNANT NEOPLASM OF BRAIN: ICD-10-CM

## 2025-08-15 DIAGNOSIS — E29.1 HYPOGONADISM MALE: ICD-10-CM

## 2025-08-18 RX ORDER — TESTOSTERONE 1.62 MG/G
2 GEL TRANSDERMAL DAILY
Qty: 75 G | Refills: 3 | Status: SHIPPED | OUTPATIENT
Start: 2025-08-18